# Patient Record
Sex: FEMALE | Race: WHITE | Employment: UNEMPLOYED | URBAN - METROPOLITAN AREA
[De-identification: names, ages, dates, MRNs, and addresses within clinical notes are randomized per-mention and may not be internally consistent; named-entity substitution may affect disease eponyms.]

---

## 2017-01-18 ENCOUNTER — DOCUMENTATION ONLY (OUTPATIENT)
Dept: ORTHOPEDIC SURGERY | Age: 57
End: 2017-01-18

## 2017-01-18 NOTE — PROGRESS NOTES
Received records request from [address] [place] [address] (Social Security Benefits claim) 1/18/17. Faxed to [facility] at .

## 2017-01-24 ENCOUNTER — DOCUMENTATION ONLY (OUTPATIENT)
Dept: ORTHOPEDIC SURGERY | Facility: CLINIC | Age: 57
End: 2017-01-24

## 2017-02-15 ENCOUNTER — OFFICE VISIT (OUTPATIENT)
Dept: CARDIOLOGY CLINIC | Age: 57
End: 2017-02-15

## 2017-02-15 VITALS
DIASTOLIC BLOOD PRESSURE: 96 MMHG | WEIGHT: 178 LBS | BODY MASS INDEX: 28.61 KG/M2 | HEART RATE: 66 BPM | SYSTOLIC BLOOD PRESSURE: 164 MMHG | HEIGHT: 66 IN

## 2017-02-15 DIAGNOSIS — Z95.5 S/P CORONARY ARTERY STENT PLACEMENT: ICD-10-CM

## 2017-02-15 DIAGNOSIS — I10 ESSENTIAL HYPERTENSION, BENIGN: ICD-10-CM

## 2017-02-15 DIAGNOSIS — G47.39 OTHER SLEEP APNEA: ICD-10-CM

## 2017-02-15 DIAGNOSIS — F17.200 SMOKER: ICD-10-CM

## 2017-02-15 DIAGNOSIS — E11.9 TYPE 2 DIABETES MELLITUS WITHOUT COMPLICATION, UNSPECIFIED LONG TERM INSULIN USE STATUS: ICD-10-CM

## 2017-02-15 DIAGNOSIS — E78.5 DYSLIPIDEMIA, GOAL LDL BELOW 70: ICD-10-CM

## 2017-02-15 DIAGNOSIS — I25.118 CORONARY ARTERY DISEASE OF NATIVE ARTERY OF NATIVE HEART WITH STABLE ANGINA PECTORIS (HCC): Primary | ICD-10-CM

## 2017-02-15 RX ORDER — HYDROCODONE BITARTRATE AND ACETAMINOPHEN 7.5; 325 MG/1; MG/1
TABLET ORAL
COMMUNITY
End: 2020-04-16

## 2017-02-15 RX ORDER — CYCLOBENZAPRINE HCL 10 MG
TABLET ORAL
COMMUNITY
End: 2020-05-18 | Stop reason: ALTCHOICE

## 2017-02-15 NOTE — MR AVS SNAPSHOT
Visit Information Date & Time Provider Department Dept. Phone Encounter #  
 2/15/2017 10:00 AM Luis Park MD [facility] [address] [place] [address] [id number] Follow-up Instructions Return in about 6 months (around 8/15/2017). Follow-up and Disposition History Your Appointments 8/8/2017  9:15 AM  
ESTABLISHED PATIENT with Luis Park MD  
[facility]) Appt Note: 6 months [address] [facility], [address] [facility] [address] [facility] [facility], [address] [place] [address] Upcoming Health Maintenance Date Due Hepatitis C Screening 1960 FOOT EXAM Q1 8/24/1972 MICROALBUMIN Q1 8/24/1972 EYE EXAM RETINAL OR DILATED Q1 8/24/1972 Pneumococcal 19-64 Medium Risk (1 of 1 - PPSV23) 8/24/1981 DTaP/Tdap/Td series (1 - Tdap) 8/24/1983 FOBT Q 1 YEAR AGE 50-75 8/24/2012 BREAST CANCER SCRN MAMMOGRAM 2/7/2016 LIPID PANEL Q1 6/4/2016 PAP AKA CERVICAL CYTOLOGY 1/17/2017 HEMOGLOBIN A1C Q6M 2/28/2017 Allergies as of 2/15/2017  Review Complete On: 2/15/2017 By: Luis Park MD  
 No Known Allergies Current Immunizations  Never Reviewed Name Date Influenza Vaccine (Quad) PF 9/27/2016  9:34 AM  
  
 Not reviewed this visit You Were Diagnosed With   
  
 Codes Comments Coronary artery disease of native artery of native heart with stable angina pectoris ([facility] [address]    -  Primary ICD-10-CM: I25.118 
ICD-9-CM: 414.01, 413.9 Essential hypertension, benign     ICD-10-CM: I10 
ICD-9-CM: 401.1 Dyslipidemia, goal LDL below 70     ICD-10-CM: E78.5 ICD-9-CM: 272.4 Type 2 diabetes mellitus without complication, unspecified long term insulin use status (HCC)     ICD-10-CM: E11.9 ICD-9-CM: 250.00 S/P coronary artery stent placement     ICD-10-CM: Z95.5 ICD-9-CM: V45.82 Smoker     ICD-10-CM: E99.281 ICD-9-CM: 305.1 Other sleep apnea     ICD-10-CM: G47.39 
ICD-9-CM: 327.29 Vitals BP Pulse Height(growth percentile) Weight(growth percentile) BMI OB Status (!) 164/96 (BP 1 Location: Left arm, BP Patient Position: At rest) 66 5' 6\" (1.676 m) 178 lb (80.7 kg) 28.73 kg/m2 Medically Induced Smoking Status Current Every Day Smoker Vitals History BMI and BSA Data Body Mass Index Body Surface Area 28.73 kg/m 2 1.94 m 2 Preferred Pharmacy Pharmacy Name Phone [facility] Bhanu Chow [address] [place] [address] [phone] Your Updated Medication List  
  
   
This list is accurate as of: 2/15/17 11:35 AM.  Always use your most recent med list.  
  
  
  
  
 ALPRAZolam 0.5 mg tablet Commonly known as:  Sharyon Kida Take 0.5 mg by mouth three (3) times daily as needed for Anxiety. aspirin delayed-release 81 mg tablet Take 1 Tab by mouth daily. atorvastatin 40 mg tablet Commonly known as:  LIPITOR Take 1 Tab by mouth nightly. BENADRYL 25 mg capsule Generic drug:  diphenhydrAMINE Take 25 mg by mouth every six (6) hours as needed for Itching. clopidogrel 75 mg Tab Commonly known as:  PLAVIX Take 1 Tab by mouth daily. cyclobenzaprine 10 mg tablet Commonly known as:  FLEXERIL Take  by mouth three (3) times daily as needed for Muscle Spasm(s). HYDROcodone-acetaminophen 7.5-325 mg per tablet Commonly known as:  Thelbert [place] Take  by mouth.  
  
 losartan 50 mg tablet Commonly known as:  COZAAR Take 1 Tab by mouth daily. metoprolol succinate 25 mg XL tablet Commonly known as:  TOPROL-XL Take 1 Tab by mouth daily. NEURONTIN 600 mg tablet Generic drug:  gabapentin Take 600 mg by mouth three (3) times daily. nitroglycerin 0.4 mg SL tablet Commonly known as:  NITROSTAT  
1 Tab by SubLINGual route as needed for Chest Pain. omeprazole 40 mg capsule Commonly known as:  PRILOSEC Take 40 mg by mouth daily. oxyCODONE-acetaminophen 5-325 mg per tablet Commonly known as:  PERCOCET Take 1 to 2 tab PO q 4-6 hrs prn pain SITagliptin 100 mg tablet Commonly known as:  Erling Sinner Take 100 mg by mouth daily. Follow-up Instructions Return in about 6 months (around 8/15/2017). Introducing [facility] SERVICES! Dear Bekah Wells: Thank you for requesting a [employer] account. Our records indicate that you already have an active [employer] account. You can access your account anytime at [email or web address] Did you know that you can access your hospital and ER discharge instructions at any time in [employer]? You can also review all of your test results from your hospital stay or ER visit. Additional Information If you have questions, please visit the Frequently Asked Questions section of the [employer] website at [email or web address] Remember, [employer] is NOT to be used for urgent needs. For medical emergencies, dial 911. Now available from your iPhone and Android! Please provide this summary of care documentation to your next provider. Your primary care clinician is listed as Maksim Silva. If you have any questions after today's visit, please call [phone].

## 2017-02-15 NOTE — PROGRESS NOTES
HISTORY OF PRESENT ILLNESS  Micah Montejo is a 49 y.o. female. Hypertension   The history is provided by the patient. This is a chronic problem. The current episode started more than 1 week ago. The problem occurs rarely. The problem has not changed since onset. Associated symptoms include chest pain. Pertinent negatives include no shortness of breath. Cholesterol Problem   The history is provided by the patient. This is a chronic problem. The current episode started more than 1 week ago. The problem has not changed since onset. Associated symptoms include chest pain. Pertinent negatives include no shortness of breath. Swelling   The history is provided by the patient. This is a chronic problem. The current episode started more than 1 week ago. The problem occurs rarely. Associated symptoms include chest pain. Pertinent negatives include no shortness of breath. Chest Pain (Angina)    The history is provided by the patient. This is a chronic problem. The current episode started more than 1 week ago. The problem has been rapidly improving. The problem occurs rarely. The pain is associated with normal activity and exertion. The pain is present in the substernal region. The pain is at a severity of 3/10. The quality of the pain is described as tightness. The pain does not radiate. Pertinent negatives include no claudication, no cough, no diaphoresis, no dizziness, no fever, no hemoptysis, no lower extremity edema, no nausea, no near-syncope, no orthopnea, no palpitations, no PND, no shortness of breath, no sputum production, no vomiting and no weakness. Risk factors include cardiac disease, smoking/tobacco exposure, hypertension, diabetes mellitus and dyslipidemia. Her past medical history is significant for DM and HTN. Procedural history includes cardiac catheterization, echocardiogram and cardiac stents. Review of Systems   Constitutional: Negative for chills, diaphoresis, fever and weight loss.    HENT: Negative for ear pain and hearing loss. Eyes: Negative for blurred vision. Respiratory: Negative for cough, hemoptysis, sputum production, shortness of breath, wheezing and stridor. Cardiovascular: Positive for chest pain. Negative for palpitations, orthopnea, claudication, leg swelling, PND and near-syncope. Gastrointestinal: Negative for heartburn, nausea and vomiting. Musculoskeletal: Negative for myalgias and neck pain. Skin: Negative for rash. Neurological: Negative for dizziness, tingling, tremors, focal weakness, loss of consciousness and weakness. Psychiatric/Behavioral: Negative for depression and suicidal ideas.      Family History   Problem Relation Age of Onset    Heart Disease Other        Past Medical History   Diagnosis Date    Arthritis     CAD (coronary artery disease)     Diabetes ([address]     Dyspnea on exertion     Essential hypertension, benign     GERD (gastroesophageal reflux disease)     H/O emotional problems     History of fall 16     Twisted right knee    Hypertension     Osteoarthritis of right knee     Osteoarthritis of right knee 2016    Other and unspecified hyperlipidemia     Pre-operative cardiovascular examination     Rheumatoid arthritis ([address]        Past Surgical History   Procedure Laterality Date    Hx  section      Hx gyn       endometrial ablation    Hx hysteroscopy with endometrial ablation      Pr cardiac surg procedure unlist       2 stents    Hx lap cholecystectomy      Hx appendectomy      Hx orthopaedic       left foot spur removal    Hx knee arthroscopy Right        Social History   Substance Use Topics    Smoking status: Current Every Day Smoker     Packs/day: 0.25     Years: 1.00    Smokeless tobacco: Never Used      Comment: 10 per day per patient      Alcohol use 0.0 oz/week     0 Standard drinks or equivalent per week      Comment: rare 1-2 yr       No Known Allergies    Outpatient Prescriptions Marked as Taking for the 2/15/17 encounter (Office Visit) with Abram Ayers MD   Medication Sig Dispense Refill    HYDROcodone-acetaminophen (NORCO) 7.5-325 mg per tablet Take  by mouth.  cyclobenzaprine (FLEXERIL) 10 mg tablet Take  by mouth three (3) times daily as needed for Muscle Spasm(s).  omeprazole (PRILOSEC) 40 mg capsule Take 40 mg by mouth daily.  diphenhydrAMINE (BENADRYL) 25 mg capsule Take 25 mg by mouth every six (6) hours as needed for Itching.  oxyCODONE-acetaminophen (PERCOCET) 5-325 mg per tablet Take 1 to 2 tab PO q 4-6 hrs prn pain (Patient taking differently: Take 2 Tabs by mouth every six (6) hours as needed for Pain.) 60 Tab 0    ALPRAZolam (XANAX) 0.5 mg tablet Take 0.5 mg by mouth three (3) times daily as needed for Anxiety.  sitaGLIPtin (JANUVIA) 100 mg tablet Take 100 mg by mouth daily.  metoprolol succinate (TOPROL-XL) 25 mg XL tablet Take 1 Tab by mouth daily. 30 Tab 1    losartan (COZAAR) 50 mg tablet Take 1 Tab by mouth daily. 30 Tab 1    atorvastatin (LIPITOR) 40 mg tablet Take 1 Tab by mouth nightly. (Patient taking differently: Take 40 mg by mouth daily.) 30 Tab 1    clopidogrel (PLAVIX) 75 mg tablet Take 1 Tab by mouth daily. 30 Tab 3    nitroglycerin (NITROSTAT) 0.4 mg SL tablet 1 Tab by SubLINGual route as needed for Chest Pain. (Patient taking differently: 1 Tab by SubLINGual route every five (5) minutes as needed for Chest Pain.) 1 Bottle 1    aspirin delayed-release 81 mg tablet Take 1 Tab by mouth daily. 30 Tab 11    gabapentin (NEURONTIN) 600 mg tablet Take 600 mg by mouth three (3) times daily. Visit Vitals    BP (!) 164/96 (BP 1 Location: Left arm, BP Patient Position: At rest)    Pulse 66    Ht 5' 6\" (1.676 m)    Wt 80.7 kg (178 lb)    BMI 28.73 kg/m2     Physical Exam   Constitutional: She is oriented to person, place, and time. She appears well-developed and well-nourished. No distress.    HENT: Head: Atraumatic. Mouth/Throat: No oropharyngeal exudate. Eyes: Conjunctivae are normal. No scleral icterus. Neck: Neck supple. No JVD present. Cardiovascular: Normal rate and regular rhythm. Exam reveals no gallop. Murmur (2/6 holosystolic murmur best heard at apex) heard. Pulmonary/Chest: Effort normal and breath sounds normal. No stridor. She has no wheezes. She has no rales. Abdominal: Soft. There is no tenderness. There is no rebound. Musculoskeletal: Normal range of motion. She exhibits no edema or tenderness. Neurological: She is alert and oriented to person, place, and time. She exhibits normal muscle tone. Skin: Skin is warm. She is not diaphoretic. Psychiatric: She has a normal mood and affect. Her behavior is normal.       ASSESSMENT and PLAN    ICD-10-CM ICD-9-CM    1. Coronary artery disease of native artery of native heart with stable angina pectoris ([facility] [address] I25.118 414.01      413.9    2. Essential hypertension, benign I10 401.1    3. Dyslipidemia, goal LDL below 70 E78.5 272.4    4. Type 2 diabetes mellitus without complication, unspecified long term insulin use status (HCC) E11.9 250.00    5. S/P coronary artery stent placement Z95.5 V45.82    6. Smoker F17.200 305.1    7. Other sleep apnea G47.39 327.29      No orders of the defined types were placed in this encounter. Follow-up Disposition:  Return in about 6 months (around 8/15/2017). current treatment plan is effective, no change in therapy  reviewed diet, exercise and weight control  very strongly urged to quit smoking to reduce cardiovascular risk  cardiovascular risk and specific lipid/LDL goals reviewed. 1) CAD--Recent stress test and echo at [facility] -- no ischemia with normal wall motion. Has residual PDA and BRIAN disease-- will continue intense medical management. use of aspirin to prevent MI and TIA's discussed. 2) dyslipidemia-- she is part of study for high cholesterol. Will obtain lipid profile.   3) post op after knee surgery--   4) htn-- her BP high due to pain in her right shoulder    Will f/u in 6months.

## 2017-02-15 NOTE — PROGRESS NOTES
1. Have you been to the ER, urgent care clinic since your last visit? Hospitalized since your last visit? No    2. Have you seen or consulted any other health care providers outside of the [address] [place] [address] since your last visit? Include any pap smears or colon screening. No     3. Since your last visit, have you had any of the following symptoms? Swelling in legs     4. Have you had any blood work, X-rays or cardiac testing? Patient had blood work with Dr. Jared Patel in December      5. Where do you normally have your labs drawn? 6. Do you need any refills today?  No     Medication confirmed by patient's medication list

## 2017-05-18 RX ORDER — NITROGLYCERIN 0.4 MG/1
0.4 TABLET SUBLINGUAL
Qty: 1 BOTTLE | Refills: 1 | Status: SHIPPED | OUTPATIENT
Start: 2017-05-18 | End: 2018-06-07 | Stop reason: SDUPTHER

## 2017-05-19 ENCOUNTER — HOSPITAL ENCOUNTER (EMERGENCY)
Age: 57
Discharge: HOME | End: 2017-05-19
Attending: EMERGENCY MEDICINE
Payer: COMMERCIAL

## 2017-05-19 ENCOUNTER — APPOINTMENT (OUTPATIENT)
Dept: GENERAL RADIOLOGY | Age: 57
End: 2017-05-19
Attending: EMERGENCY MEDICINE
Payer: COMMERCIAL

## 2017-05-19 VITALS
SYSTOLIC BLOOD PRESSURE: 153 MMHG | WEIGHT: 177 LBS | TEMPERATURE: 98.4 F | BODY MASS INDEX: 28.45 KG/M2 | HEART RATE: 72 BPM | RESPIRATION RATE: 15 BRPM | OXYGEN SATURATION: 99 % | HEIGHT: 66 IN | DIASTOLIC BLOOD PRESSURE: 80 MMHG

## 2017-05-19 DIAGNOSIS — R51.9 NONINTRACTABLE HEADACHE, UNSPECIFIED CHRONICITY PATTERN, UNSPECIFIED HEADACHE TYPE: ICD-10-CM

## 2017-05-19 DIAGNOSIS — K29.90 GASTRITIS AND DUODENITIS: Primary | ICD-10-CM

## 2017-05-19 LAB
ALBUMIN SERPL BCP-MCNC: 3.7 G/DL (ref 3.4–5)
ALBUMIN/GLOB SERPL: 1 {RATIO} (ref 0.8–1.7)
ALP SERPL-CCNC: 108 U/L (ref 45–117)
ALT SERPL-CCNC: 22 U/L (ref 13–56)
ANION GAP BLD CALC-SCNC: 10 MMOL/L (ref 3–18)
AST SERPL W P-5'-P-CCNC: 16 U/L (ref 15–37)
ATRIAL RATE: 76 BPM
BASOPHILS # BLD AUTO: 0 K/UL (ref 0–0.1)
BASOPHILS # BLD: 0 % (ref 0–2)
BILIRUB SERPL-MCNC: 0.2 MG/DL (ref 0.2–1)
BUN SERPL-MCNC: 12 MG/DL (ref 7–18)
BUN/CREAT SERPL: 11 (ref 12–20)
CALCIUM SERPL-MCNC: 9.2 MG/DL (ref 8.5–10.1)
CALCULATED P AXIS, ECG09: 67 DEGREES
CALCULATED R AXIS, ECG10: 37 DEGREES
CALCULATED T AXIS, ECG11: 72 DEGREES
CHLORIDE SERPL-SCNC: 102 MMOL/L (ref 100–108)
CO2 SERPL-SCNC: 27 MMOL/L (ref 21–32)
CREAT SERPL-MCNC: 1.06 MG/DL (ref 0.6–1.3)
DIAGNOSIS, 93000: NORMAL
DIFFERENTIAL METHOD BLD: ABNORMAL
EOSINOPHIL # BLD: 0 K/UL (ref 0–0.4)
EOSINOPHIL NFR BLD: 0 % (ref 0–5)
ERYTHROCYTE [DISTWIDTH] IN BLOOD BY AUTOMATED COUNT: 12 % (ref 11.6–14.5)
GLOBULIN SER CALC-MCNC: 3.7 G/DL (ref 2–4)
GLUCOSE SERPL-MCNC: 326 MG/DL (ref 74–99)
HCT VFR BLD AUTO: 41.5 % (ref 35–45)
HGB BLD-MCNC: 14.8 G/DL (ref 12–16)
LIPASE SERPL-CCNC: 167 U/L (ref 73–393)
LYMPHOCYTES # BLD AUTO: 10 % (ref 21–52)
LYMPHOCYTES # BLD: 1.4 K/UL (ref 0.9–3.6)
MCH RBC QN AUTO: 33.7 PG (ref 24–34)
MCHC RBC AUTO-ENTMCNC: 35.7 G/DL (ref 31–37)
MCV RBC AUTO: 94.5 FL (ref 74–97)
MONOCYTES # BLD: 0.8 K/UL (ref 0.05–1.2)
MONOCYTES NFR BLD AUTO: 6 % (ref 3–10)
NEUTS SEG # BLD: 12.3 K/UL (ref 1.8–8)
NEUTS SEG NFR BLD AUTO: 84 % (ref 40–73)
P-R INTERVAL, ECG05: 180 MS
PLATELET # BLD AUTO: 319 K/UL (ref 135–420)
PMV BLD AUTO: 10.6 FL (ref 9.2–11.8)
POTASSIUM SERPL-SCNC: 3.8 MMOL/L (ref 3.5–5.5)
PROT SERPL-MCNC: 7.4 G/DL (ref 6.4–8.2)
Q-T INTERVAL, ECG07: 356 MS
QRS DURATION, ECG06: 68 MS
QTC CALCULATION (BEZET), ECG08: 400 MS
RBC # BLD AUTO: 4.39 M/UL (ref 4.2–5.3)
SODIUM SERPL-SCNC: 139 MMOL/L (ref 136–145)
VENTRICULAR RATE, ECG03: 76 BPM
WBC # BLD AUTO: 14.5 K/UL (ref 4.6–13.2)

## 2017-05-19 PROCEDURE — 96374 THER/PROPH/DIAG INJ IV PUSH: CPT

## 2017-05-19 PROCEDURE — 74011250636 HC RX REV CODE- 250/636: Performed by: EMERGENCY MEDICINE

## 2017-05-19 PROCEDURE — 83690 ASSAY OF LIPASE: CPT | Performed by: EMERGENCY MEDICINE

## 2017-05-19 PROCEDURE — C9113 INJ PANTOPRAZOLE SODIUM, VIA: HCPCS | Performed by: EMERGENCY MEDICINE

## 2017-05-19 PROCEDURE — 85025 COMPLETE CBC W/AUTO DIFF WBC: CPT | Performed by: EMERGENCY MEDICINE

## 2017-05-19 PROCEDURE — 99285 EMERGENCY DEPT VISIT HI MDM: CPT

## 2017-05-19 PROCEDURE — 80053 COMPREHEN METABOLIC PANEL: CPT | Performed by: EMERGENCY MEDICINE

## 2017-05-19 PROCEDURE — 71010 XR CHEST PORT: CPT

## 2017-05-19 PROCEDURE — 96361 HYDRATE IV INFUSION ADD-ON: CPT

## 2017-05-19 PROCEDURE — 74011250637 HC RX REV CODE- 250/637: Performed by: EMERGENCY MEDICINE

## 2017-05-19 PROCEDURE — 93005 ELECTROCARDIOGRAM TRACING: CPT

## 2017-05-19 RX ORDER — PROMETHAZINE HYDROCHLORIDE 25 MG/1
25 TABLET ORAL
Qty: 12 TAB | Refills: 0 | Status: SHIPPED | OUTPATIENT
Start: 2017-05-19 | End: 2020-05-18

## 2017-05-19 RX ORDER — PANTOPRAZOLE SODIUM 40 MG/10ML
40 INJECTION, POWDER, LYOPHILIZED, FOR SOLUTION INTRAVENOUS
Status: COMPLETED | OUTPATIENT
Start: 2017-05-19 | End: 2017-05-19

## 2017-05-19 RX ORDER — ACETAMINOPHEN 500 MG
1000 TABLET ORAL
Status: COMPLETED | OUTPATIENT
Start: 2017-05-19 | End: 2017-05-19

## 2017-05-19 RX ORDER — PROMETHAZINE HYDROCHLORIDE 25 MG/1
25 TABLET ORAL
Status: COMPLETED | OUTPATIENT
Start: 2017-05-19 | End: 2017-05-19

## 2017-05-19 RX ADMIN — SODIUM CHLORIDE 500 ML: 900 INJECTION, SOLUTION INTRAVENOUS at 09:17

## 2017-05-19 RX ADMIN — SODIUM CHLORIDE 1000 ML: 900 INJECTION, SOLUTION INTRAVENOUS at 10:00

## 2017-05-19 RX ADMIN — ACETAMINOPHEN 1000 MG: 500 TABLET ORAL at 09:49

## 2017-05-19 RX ADMIN — PHENOBARBITAL 30 ML: 16.2; .1037; .0065; .0194 ELIXIR ORAL at 09:17

## 2017-05-19 RX ADMIN — PANTOPRAZOLE SODIUM 40 MG: 40 INJECTION, POWDER, FOR SOLUTION INTRAVENOUS at 09:17

## 2017-05-19 RX ADMIN — PROMETHAZINE HYDROCHLORIDE 25 MG: 25 TABLET ORAL at 10:53

## 2017-05-19 NOTE — ED NOTES
PT discharged per MD order. PT verbalized understanding of discharge instructions and follow up care. Patient ambulated self out of ED. Daughter will be driving PT home due to phenergan dose.

## 2017-05-19 NOTE — ED PROVIDER NOTES
HPI Comments: 8:43 AM Beth Stiles is a 49 y.o. female with a history of CAD, HTN, DM and GERD who presents to the emergency department for epigastric and RUQ abdominal pain and headache that began around 0200 this morning. Pt states, \"It feels like my GERD but a little higher\". Pt notes taking 2 Nitro and her daily Prilosec 40 mg without change in her symptoms. She denies any associated nausea or vomiting, no diarrhea or constipation, no melena or hematochezia. She denies any recent changes to her diet or medications. She states that she did have a chocolate milkshake last night which is out of the norm for her but felt fine upon going to bed; she also notes that her blood sugar was high this morning. She denies any chest pain, no palpitations, no cough or dyspnea. Pt has had a previous Cholecystectomy, hysterectomy and ; she notes that she has had persistent LLQ pain since her hysterectomy in January. She is followed by Dr. Sylvie Anthony, cardiology, and isn't scheduled to return to the office for several months. No other acute symptoms or complaints were noted. The history is provided by the patient.         Past Medical History:   Diagnosis Date    Arthritis     CAD (coronary artery disease)     Diabetes ([address]     Dyspnea on exertion     Essential hypertension, benign     GERD (gastroesophageal reflux disease)     H/O emotional problems     History of fall 14    Twisted right knee    Hypertension     Osteoarthritis of right knee     Osteoarthritis of right knee 2016    Other and unspecified hyperlipidemia     Pre-operative cardiovascular examination     Rheumatoid arthritis ([address]        Past Surgical History:   Procedure Laterality Date    CARDIAC SURG PROCEDURE UNLIST      2 stents    HX APPENDECTOMY      HX  SECTION      HX GYN      endometrial ablation    HX HYSTEROSCOPY WITH ENDOMETRIAL ABLATION      HX KNEE ARTHROSCOPY Right     HX LAP CHOLECYSTECTOMY      HX ORTHOPAEDIC      left foot spur removal         Family History:   Problem Relation Age of Onset    Heart Disease Other        Social History     Social History    Marital status:      Spouse name: N/A    Number of children: N/A    Years of education: N/A     Occupational History    Not on file. Social History Main Topics    Smoking status: Current Every Day Smoker     Packs/day: 0.25     Years: 1.00    Smokeless tobacco: Never Used      Comment: 10 per day per patient      Alcohol use 0.0 oz/week     0 Standard drinks or equivalent per week      Comment: rare 1-2 yr    Drug use: No    Sexual activity: Yes     Other Topics Concern    Not on file     Social History Narrative         ALLERGIES: Review of patient's allergies indicates no known allergies. Review of Systems   Constitutional: Negative for chills, diaphoresis and fever. HENT: Negative for congestion and rhinorrhea. Eyes: Negative. Respiratory: Negative for cough and shortness of breath. Cardiovascular: Negative for chest pain, palpitations and leg swelling. Gastrointestinal: Positive for abdominal pain (epigastric ). Negative for abdominal distention, anal bleeding, blood in stool, constipation, diarrhea, nausea and vomiting. Endocrine: Negative. Genitourinary: Negative for dysuria and hematuria. Musculoskeletal: Negative for arthralgias, back pain, myalgias, neck pain and neck stiffness. Skin: Negative for rash and wound. Allergic/Immunologic: Negative. Neurological: Negative for syncope, light-headedness and headaches. Hematological: Does not bruise/bleed easily. Psychiatric/Behavioral: Negative for confusion and hallucinations. All other systems reviewed and are negative.       Vitals:    05/19/17 0839   BP: (!) 170/114   Pulse: 92   Resp: 20   Temp: 98.4 °F (36.9 °C)   SpO2: 98%   Weight: 80.3 kg (177 lb)   Height: 5' 6\" (1.676 m)            Physical Exam Constitutional: She is oriented to person, place, and time. She appears well-developed and well-nourished. No distress. Resting comfortably on stretcher   HENT:   Head: Normocephalic and atraumatic. MM moist   Eyes: Conjunctivae and EOM are normal. No scleral icterus. Sclera clear bilaterally   Neck: Normal range of motion. Neck supple. No JVD present. Non-tender to palpation   Cardiovascular: Normal rate, regular rhythm and normal heart sounds. Exam reveals no gallop and no friction rub. No murmur heard. Pulmonary/Chest: Effort normal and breath sounds normal. No respiratory distress. She has no wheezes. She has no rales. She exhibits no tenderness. No crepitance with palpation   Abdominal: Soft. Bowel sounds are normal. She exhibits no distension and no mass. There is no tenderness. There is no rebound and no guarding. Genitourinary:   Genitourinary Comments: No CVA tenderness   Musculoskeletal: She exhibits no edema or tenderness. Normal inspection of upper extremities. No edema noted to bilateral lower extremities   Lymphadenopathy:     She has no cervical adenopathy. Neurological: She is alert and oriented to person, place, and time. No cranial nerve deficit. She exhibits normal muscle tone. Normal motor and sensation bilaterally to upper and lower extremities   Skin: Skin is warm and dry. No rash noted. She is not diaphoretic. Psychiatric:   Normal mood and affect. Vitals reviewed. MDM  Number of Diagnoses or Management Options  Diagnosis management comments: Pt with complaint of RUQ abdominal pain without associated infectious, cardiac, pulmonary or GI symptoms. Benign exam and reassuring VS.  Will treat headache, intact neuro exam, suspect due to NTG use. Will check labs and treat symptoms. Reassess. Reviewed old chart:  Stress and echo done at [facility] March 2016 which showed no ischemia or RWMA. Pt states she is feeling somewhat better and is ready to go home. She will return to ED if symptoms do not continue to improve or worsen and will return to ED if she develops chest pain. She will follow up with her PMD next week. Amount and/or Complexity of Data Reviewed  Clinical lab tests: ordered and reviewed  Tests in the radiology section of CPT®: ordered and reviewed  Tests in the medicine section of CPT®: ordered and reviewed  Decide to obtain previous medical records or to obtain history from someone other than the patient: yes  Obtain history from someone other than the patient: yes  Discuss the patient with other providers: yes  Independent visualization of images, tracings, or specimens: yes    Risk of Complications, Morbidity, and/or Mortality  Presenting problems: moderate  Management options: moderate    Patient Progress  Patient progress: improved    ED Course       Procedures    EKG:  NSR, rate 76, normal axis, no significant ST-T abnormalities. No significant changes noted from 8/30/16. CXR:  No acute pulmonary process      Scribe Attestation:     I, [address] [place] [address] for and in the presence of Caden Pineda MD May 19, 2017 at 8:44 AM     Signed by: Ant Romo May 19, 2017, 8:44 AM      Physician Attestation:   I personally performed the services described in this documentation, reviewed and edited the documentation which was dictated to the scribe in my presence, and it accurately records my words and actions.  Caden Pineda MD  May 19, 2017 at 8:44 AM

## 2017-05-19 NOTE — ED NOTES
Provided ice pack for comfort per PT request. Pain Assessment completed. Safety Intact. Will continue to monitor.

## 2017-05-19 NOTE — ED NOTES
PT visibly distressed, dypsneic, c/o right chest pain under breast, states \"It feels like heartburn but different\", reports taking 2 Nitro SL at home with no relief, assisted to change into hospital gown, monitors attached, family at bedside, safety intact, will continue to monitor

## 2017-05-19 NOTE — ED NOTES
Patient ambulated to restroom, provided urine sample, assisted back to bed and monitors reattched, safety intact, will continue to monitor

## 2018-06-07 RX ORDER — NITROGLYCERIN 0.4 MG/1
TABLET SUBLINGUAL
Qty: 25 TAB | Refills: 0 | Status: SHIPPED | OUTPATIENT
Start: 2018-06-07 | End: 2020-05-18 | Stop reason: SDUPTHER

## 2018-11-07 ENCOUNTER — APPOINTMENT (OUTPATIENT)
Dept: PHYSICAL THERAPY | Age: 58
End: 2018-11-07

## 2018-12-29 ENCOUNTER — HOSPITAL ENCOUNTER (OUTPATIENT)
Dept: LAB | Age: 58
Discharge: HOME | End: 2018-12-29

## 2018-12-29 LAB — XX-LABCORP SPECIMEN COL,LCBCF: NORMAL

## 2018-12-29 PROCEDURE — 99001 SPECIMEN HANDLING PT-LAB: CPT

## 2019-01-02 ENCOUNTER — HOSPITAL ENCOUNTER (OUTPATIENT)
Dept: GENERAL RADIOLOGY | Age: 59
Discharge: HOME | End: 2019-01-02
Payer: MEDICARE

## 2019-01-02 DIAGNOSIS — Z01.818 PRE-OP TESTING: ICD-10-CM

## 2019-01-02 PROCEDURE — 71046 X-RAY EXAM CHEST 2 VIEWS: CPT

## 2019-01-04 ENCOUNTER — HOSPITAL ENCOUNTER (OUTPATIENT)
Dept: LAB | Age: 59
Discharge: HOME | End: 2019-01-04
Payer: MEDICARE

## 2019-01-04 PROCEDURE — 88311 DECALCIFY TISSUE: CPT

## 2019-01-04 PROCEDURE — 88305 TISSUE EXAM BY PATHOLOGIST: CPT

## 2019-01-04 PROCEDURE — 88307 TISSUE EXAM BY PATHOLOGIST: CPT

## 2020-01-19 ENCOUNTER — HOME HEALTH ADMISSION (OUTPATIENT)
Dept: HOME HEALTH SERVICES | Facility: HOME HEALTH | Age: 60
End: 2020-01-19
Payer: MEDICARE

## 2020-01-20 ENCOUNTER — HOME CARE VISIT (OUTPATIENT)
Dept: HOME HEALTH SERVICES | Facility: HOME HEALTH | Age: 60
End: 2020-01-20

## 2020-01-20 ENCOUNTER — HOME CARE VISIT (OUTPATIENT)
Dept: SCHEDULING | Facility: HOME HEALTH | Age: 60
End: 2020-01-20
Payer: MEDICARE

## 2020-01-20 VITALS
DIASTOLIC BLOOD PRESSURE: 68 MMHG | OXYGEN SATURATION: 97 % | SYSTOLIC BLOOD PRESSURE: 126 MMHG | TEMPERATURE: 98.3 F | HEART RATE: 70 BPM

## 2020-01-20 PROCEDURE — 400013 HH SOC

## 2020-01-20 PROCEDURE — G0151 HHCP-SERV OF PT,EA 15 MIN: HCPCS

## 2020-01-20 PROCEDURE — G0299 HHS/HOSPICE OF RN EA 15 MIN: HCPCS

## 2020-01-21 ENCOUNTER — HOME CARE VISIT (OUTPATIENT)
Dept: HOME HEALTH SERVICES | Facility: HOME HEALTH | Age: 60
End: 2020-01-21
Payer: MEDICARE

## 2020-01-21 VITALS
TEMPERATURE: 97.3 F | WEIGHT: 162 LBS | HEIGHT: 66 IN | SYSTOLIC BLOOD PRESSURE: 124 MMHG | OXYGEN SATURATION: 94 % | DIASTOLIC BLOOD PRESSURE: 80 MMHG | BODY MASS INDEX: 26.03 KG/M2 | RESPIRATION RATE: 16 BRPM | HEART RATE: 71 BPM

## 2020-01-22 ENCOUNTER — HOME CARE VISIT (OUTPATIENT)
Dept: SCHEDULING | Facility: HOME HEALTH | Age: 60
End: 2020-01-22
Payer: MEDICARE

## 2020-01-22 VITALS
SYSTOLIC BLOOD PRESSURE: 136 MMHG | OXYGEN SATURATION: 97 % | TEMPERATURE: 97.7 F | DIASTOLIC BLOOD PRESSURE: 79 MMHG | HEART RATE: 76 BPM

## 2020-01-22 PROCEDURE — G0157 HHC PT ASSISTANT EA 15: HCPCS

## 2020-01-24 ENCOUNTER — HOME CARE VISIT (OUTPATIENT)
Dept: SCHEDULING | Facility: HOME HEALTH | Age: 60
End: 2020-01-24
Payer: MEDICARE

## 2020-01-24 VITALS
TEMPERATURE: 97.9 F | OXYGEN SATURATION: 97 % | SYSTOLIC BLOOD PRESSURE: 130 MMHG | DIASTOLIC BLOOD PRESSURE: 75 MMHG | HEART RATE: 69 BPM

## 2020-01-24 PROCEDURE — G0157 HHC PT ASSISTANT EA 15: HCPCS

## 2020-01-24 PROCEDURE — G0300 HHS/HOSPICE OF LPN EA 15 MIN: HCPCS

## 2020-01-27 ENCOUNTER — HOME CARE VISIT (OUTPATIENT)
Dept: SCHEDULING | Facility: HOME HEALTH | Age: 60
End: 2020-01-27
Payer: MEDICARE

## 2020-01-27 VITALS
OXYGEN SATURATION: 97 % | HEART RATE: 76 BPM | DIASTOLIC BLOOD PRESSURE: 85 MMHG | TEMPERATURE: 97.7 F | SYSTOLIC BLOOD PRESSURE: 138 MMHG

## 2020-01-27 PROCEDURE — G0157 HHC PT ASSISTANT EA 15: HCPCS

## 2020-01-29 ENCOUNTER — HOME CARE VISIT (OUTPATIENT)
Dept: SCHEDULING | Facility: HOME HEALTH | Age: 60
End: 2020-01-29
Payer: MEDICARE

## 2020-01-29 VITALS
HEART RATE: 67 BPM | TEMPERATURE: 97 F | DIASTOLIC BLOOD PRESSURE: 86 MMHG | OXYGEN SATURATION: 97 % | SYSTOLIC BLOOD PRESSURE: 151 MMHG | RESPIRATION RATE: 18 BRPM

## 2020-01-29 PROCEDURE — G0157 HHC PT ASSISTANT EA 15: HCPCS

## 2020-01-31 ENCOUNTER — HOME CARE VISIT (OUTPATIENT)
Dept: SCHEDULING | Facility: HOME HEALTH | Age: 60
End: 2020-01-31
Payer: MEDICARE

## 2020-01-31 VITALS
OXYGEN SATURATION: 98 % | HEART RATE: 66 BPM | TEMPERATURE: 97.9 F | DIASTOLIC BLOOD PRESSURE: 92 MMHG | SYSTOLIC BLOOD PRESSURE: 169 MMHG

## 2020-01-31 VITALS
HEART RATE: 69 BPM | OXYGEN SATURATION: 98 % | DIASTOLIC BLOOD PRESSURE: 79 MMHG | TEMPERATURE: 98.7 F | SYSTOLIC BLOOD PRESSURE: 147 MMHG

## 2020-01-31 PROCEDURE — G0157 HHC PT ASSISTANT EA 15: HCPCS

## 2020-02-04 ENCOUNTER — HOME CARE VISIT (OUTPATIENT)
Dept: SCHEDULING | Facility: HOME HEALTH | Age: 60
End: 2020-02-04
Payer: MEDICARE

## 2020-02-04 VITALS
HEART RATE: 62 BPM | OXYGEN SATURATION: 99 % | SYSTOLIC BLOOD PRESSURE: 154 MMHG | DIASTOLIC BLOOD PRESSURE: 80 MMHG | TEMPERATURE: 98.2 F

## 2020-02-04 PROCEDURE — G0151 HHCP-SERV OF PT,EA 15 MIN: HCPCS

## 2020-04-14 ENCOUNTER — APPOINTMENT (OUTPATIENT)
Dept: GENERAL RADIOLOGY | Age: 60
DRG: 282 | End: 2020-04-14
Attending: EMERGENCY MEDICINE
Payer: MEDICARE

## 2020-04-14 ENCOUNTER — HOSPITAL ENCOUNTER (INPATIENT)
Age: 60
LOS: 2 days | Discharge: HOME | DRG: 282 | End: 2020-04-16
Attending: EMERGENCY MEDICINE | Admitting: INTERNAL MEDICINE
Payer: MEDICARE

## 2020-04-14 DIAGNOSIS — I21.4 NSTEMI (NON-ST ELEVATED MYOCARDIAL INFARCTION) (HCC): Primary | ICD-10-CM

## 2020-04-14 PROBLEM — R07.9 CHEST PAIN: Status: ACTIVE | Noted: 2020-04-14

## 2020-04-14 PROBLEM — E87.6 HYPOKALEMIA: Status: ACTIVE | Noted: 2020-04-14

## 2020-04-14 PROBLEM — I25.10 CAD (CORONARY ARTERY DISEASE): Status: ACTIVE | Noted: 2020-04-14

## 2020-04-14 LAB
ALBUMIN SERPL-MCNC: 3.9 G/DL (ref 3.4–5)
ALBUMIN/GLOB SERPL: 1.1 {RATIO} (ref 0.8–1.7)
ALP SERPL-CCNC: 96 U/L (ref 45–117)
ALT SERPL-CCNC: 15 U/L (ref 13–56)
ANION GAP SERPL CALC-SCNC: 9 MMOL/L (ref 3–18)
APTT PPP: 21.8 SEC (ref 23–36.4)
AST SERPL-CCNC: 10 U/L (ref 10–38)
BASOPHILS # BLD: 0.1 K/UL (ref 0–0.1)
BASOPHILS NFR BLD: 1 % (ref 0–2)
BILIRUB SERPL-MCNC: 0.3 MG/DL (ref 0.2–1)
BUN SERPL-MCNC: 15 MG/DL (ref 7–18)
BUN/CREAT SERPL: 16 (ref 12–20)
CALCIUM SERPL-MCNC: 8.7 MG/DL (ref 8.5–10.1)
CHLORIDE SERPL-SCNC: 105 MMOL/L (ref 100–111)
CK MB CFR SERPL CALC: 4.3 % (ref 0–4)
CK MB CFR SERPL CALC: 6.3 % (ref 0–4)
CK MB CFR SERPL CALC: NORMAL % (ref 0–4)
CK MB SERPL-MCNC: 3.6 NG/ML (ref 5–25)
CK MB SERPL-MCNC: 6.9 NG/ML (ref 5–25)
CK MB SERPL-MCNC: <1 NG/ML (ref 5–25)
CK SERPL-CCNC: 109 U/L (ref 26–192)
CK SERPL-CCNC: 57 U/L (ref 26–192)
CK SERPL-CCNC: 84 U/L (ref 26–192)
CO2 SERPL-SCNC: 27 MMOL/L (ref 21–32)
CREAT SERPL-MCNC: 0.92 MG/DL (ref 0.6–1.3)
DIFFERENTIAL METHOD BLD: ABNORMAL
EOSINOPHIL # BLD: 0.3 K/UL (ref 0–0.4)
EOSINOPHIL NFR BLD: 2 % (ref 0–5)
ERYTHROCYTE [DISTWIDTH] IN BLOOD BY AUTOMATED COUNT: 12.4 % (ref 11.6–14.5)
GLOBULIN SER CALC-MCNC: 3.7 G/DL (ref 2–4)
GLUCOSE SERPL-MCNC: 199 MG/DL (ref 74–99)
HCT VFR BLD AUTO: 43.7 % (ref 35–45)
HGB BLD-MCNC: 14.7 G/DL (ref 12–16)
LYMPHOCYTES # BLD: 2.7 K/UL (ref 0.9–3.6)
LYMPHOCYTES NFR BLD: 22 % (ref 21–52)
MCH RBC QN AUTO: 32.3 PG (ref 24–34)
MCHC RBC AUTO-ENTMCNC: 33.6 G/DL (ref 31–37)
MCV RBC AUTO: 96 FL (ref 74–97)
MONOCYTES # BLD: 0.7 K/UL (ref 0.05–1.2)
MONOCYTES NFR BLD: 6 % (ref 3–10)
NEUTS SEG # BLD: 8.4 K/UL (ref 1.8–8)
NEUTS SEG NFR BLD: 69 % (ref 40–73)
PLATELET # BLD AUTO: 345 K/UL (ref 135–420)
PMV BLD AUTO: 10.5 FL (ref 9.2–11.8)
POTASSIUM SERPL-SCNC: 3.1 MMOL/L (ref 3.5–5.5)
PROT SERPL-MCNC: 7.6 G/DL (ref 6.4–8.2)
RBC # BLD AUTO: 4.55 M/UL (ref 4.2–5.3)
SODIUM SERPL-SCNC: 141 MMOL/L (ref 136–145)
TROPONIN I SERPL-MCNC: 0.86 NG/ML (ref 0–0.04)
TROPONIN I SERPL-MCNC: 2.16 NG/ML (ref 0–0.04)
TROPONIN I SERPL-MCNC: <0.02 NG/ML (ref 0–0.04)
WBC # BLD AUTO: 12.1 K/UL (ref 4.6–13.2)

## 2020-04-14 PROCEDURE — 96375 TX/PRO/DX INJ NEW DRUG ADDON: CPT

## 2020-04-14 PROCEDURE — 74011250637 HC RX REV CODE- 250/637: Performed by: EMERGENCY MEDICINE

## 2020-04-14 PROCEDURE — 99285 EMERGENCY DEPT VISIT HI MDM: CPT

## 2020-04-14 PROCEDURE — B2151ZZ FLUOROSCOPY OF LEFT HEART USING LOW OSMOLAR CONTRAST: ICD-10-PCS | Performed by: INTERNAL MEDICINE

## 2020-04-14 PROCEDURE — 80053 COMPREHEN METABOLIC PANEL: CPT

## 2020-04-14 PROCEDURE — 74011250636 HC RX REV CODE- 250/636: Performed by: EMERGENCY MEDICINE

## 2020-04-14 PROCEDURE — 82550 ASSAY OF CK (CPK): CPT

## 2020-04-14 PROCEDURE — 71045 X-RAY EXAM CHEST 1 VIEW: CPT

## 2020-04-14 PROCEDURE — 85025 COMPLETE CBC W/AUTO DIFF WBC: CPT

## 2020-04-14 PROCEDURE — 93005 ELECTROCARDIOGRAM TRACING: CPT

## 2020-04-14 PROCEDURE — 96374 THER/PROPH/DIAG INJ IV PUSH: CPT

## 2020-04-14 PROCEDURE — 74011250637 HC RX REV CODE- 250/637: Performed by: INTERNAL MEDICINE

## 2020-04-14 PROCEDURE — B2111ZZ FLUOROSCOPY OF MULTIPLE CORONARY ARTERIES USING LOW OSMOLAR CONTRAST: ICD-10-PCS | Performed by: INTERNAL MEDICINE

## 2020-04-14 PROCEDURE — 65660000000 HC RM CCU STEPDOWN

## 2020-04-14 PROCEDURE — 85730 THROMBOPLASTIN TIME PARTIAL: CPT

## 2020-04-14 RX ORDER — POTASSIUM CHLORIDE 20 MEQ/1
40 TABLET, EXTENDED RELEASE ORAL EVERY 4 HOURS
Status: COMPLETED | OUTPATIENT
Start: 2020-04-14 | End: 2020-04-14

## 2020-04-14 RX ORDER — MORPHINE SULFATE 4 MG/ML
4 INJECTION, SOLUTION INTRAMUSCULAR; INTRAVENOUS
Status: COMPLETED | OUTPATIENT
Start: 2020-04-14 | End: 2020-04-14

## 2020-04-14 RX ORDER — PANTOPRAZOLE SODIUM 40 MG/1
40 TABLET, DELAYED RELEASE ORAL
Status: DISCONTINUED | OUTPATIENT
Start: 2020-04-15 | End: 2020-04-16 | Stop reason: HOSPADM

## 2020-04-14 RX ORDER — NITROGLYCERIN 0.4 MG/1
0.4 TABLET SUBLINGUAL AS NEEDED
Status: DISCONTINUED | OUTPATIENT
Start: 2020-04-14 | End: 2020-04-16 | Stop reason: HOSPADM

## 2020-04-14 RX ORDER — ACETAMINOPHEN 325 MG/1
650 TABLET ORAL
Status: DISCONTINUED | OUTPATIENT
Start: 2020-04-14 | End: 2020-04-16 | Stop reason: HOSPADM

## 2020-04-14 RX ORDER — OXYCODONE HYDROCHLORIDE 5 MG/1
5 TABLET ORAL
Status: DISCONTINUED | OUTPATIENT
Start: 2020-04-14 | End: 2020-04-16 | Stop reason: HOSPADM

## 2020-04-14 RX ORDER — AMLODIPINE BESYLATE 10 MG/1
10 TABLET ORAL DAILY
Status: DISCONTINUED | OUTPATIENT
Start: 2020-04-15 | End: 2020-04-16 | Stop reason: HOSPADM

## 2020-04-14 RX ORDER — DIPHENHYDRAMINE HCL 25 MG
25 CAPSULE ORAL
Status: DISCONTINUED | OUTPATIENT
Start: 2020-04-14 | End: 2020-04-16 | Stop reason: HOSPADM

## 2020-04-14 RX ORDER — HYDROXYCHLOROQUINE SULFATE 200 MG/1
200 TABLET, FILM COATED ORAL 2 TIMES DAILY
Status: DISCONTINUED | OUTPATIENT
Start: 2020-04-14 | End: 2020-04-16 | Stop reason: HOSPADM

## 2020-04-14 RX ORDER — CYCLOBENZAPRINE HCL 10 MG
5 TABLET ORAL
Status: DISCONTINUED | OUTPATIENT
Start: 2020-04-14 | End: 2020-04-16 | Stop reason: HOSPADM

## 2020-04-14 RX ORDER — METOPROLOL SUCCINATE 25 MG/1
25 TABLET, EXTENDED RELEASE ORAL DAILY
Status: DISCONTINUED | OUTPATIENT
Start: 2020-04-15 | End: 2020-04-16 | Stop reason: HOSPADM

## 2020-04-14 RX ORDER — VENLAFAXINE HYDROCHLORIDE 37.5 MG/1
37.5 CAPSULE, EXTENDED RELEASE ORAL DAILY
Status: DISCONTINUED | OUTPATIENT
Start: 2020-04-15 | End: 2020-04-16 | Stop reason: HOSPADM

## 2020-04-14 RX ORDER — ONDANSETRON 2 MG/ML
4 INJECTION INTRAMUSCULAR; INTRAVENOUS
Status: DISCONTINUED | OUTPATIENT
Start: 2020-04-14 | End: 2020-04-16 | Stop reason: HOSPADM

## 2020-04-14 RX ORDER — GABAPENTIN 400 MG/1
400 CAPSULE ORAL 3 TIMES DAILY
Status: DISCONTINUED | OUTPATIENT
Start: 2020-04-14 | End: 2020-04-16 | Stop reason: HOSPADM

## 2020-04-14 RX ORDER — POTASSIUM CHLORIDE 20 MEQ/1
40 TABLET, EXTENDED RELEASE ORAL
Status: COMPLETED | OUTPATIENT
Start: 2020-04-14 | End: 2020-04-14

## 2020-04-14 RX ORDER — ALPRAZOLAM 0.25 MG/1
0.25 TABLET ORAL
Status: DISCONTINUED | OUTPATIENT
Start: 2020-04-14 | End: 2020-04-16 | Stop reason: HOSPADM

## 2020-04-14 RX ORDER — DICLOFENAC EPOLAMINE 0.01 G/1
1 PATCH TOPICAL EVERY 12 HOURS
Status: DISCONTINUED | OUTPATIENT
Start: 2020-04-14 | End: 2020-04-16 | Stop reason: HOSPADM

## 2020-04-14 RX ORDER — HEPARIN SODIUM 10000 [USP'U]/100ML
12-25 INJECTION, SOLUTION INTRAVENOUS
Status: DISCONTINUED | OUTPATIENT
Start: 2020-04-14 | End: 2020-04-15

## 2020-04-14 RX ORDER — ALOGLIPTIN 6.25 MG/1
12.5 TABLET, FILM COATED ORAL DAILY
Status: DISCONTINUED | OUTPATIENT
Start: 2020-04-15 | End: 2020-04-16 | Stop reason: HOSPADM

## 2020-04-14 RX ORDER — ONDANSETRON 2 MG/ML
4 INJECTION INTRAMUSCULAR; INTRAVENOUS
Status: COMPLETED | OUTPATIENT
Start: 2020-04-14 | End: 2020-04-14

## 2020-04-14 RX ORDER — HEPARIN SODIUM 1000 [USP'U]/ML
4000 INJECTION, SOLUTION INTRAVENOUS; SUBCUTANEOUS ONCE
Status: COMPLETED | OUTPATIENT
Start: 2020-04-14 | End: 2020-04-14

## 2020-04-14 RX ORDER — ASPIRIN 81 MG/1
81 TABLET ORAL DAILY
Status: DISCONTINUED | OUTPATIENT
Start: 2020-04-15 | End: 2020-04-16 | Stop reason: HOSPADM

## 2020-04-14 RX ORDER — ATORVASTATIN CALCIUM 40 MG/1
40 TABLET, FILM COATED ORAL
Status: DISCONTINUED | OUTPATIENT
Start: 2020-04-14 | End: 2020-04-15

## 2020-04-14 RX ORDER — LOSARTAN POTASSIUM 25 MG/1
50 TABLET ORAL DAILY
Status: DISCONTINUED | OUTPATIENT
Start: 2020-04-15 | End: 2020-04-15

## 2020-04-14 RX ORDER — CALCIUM CARBONATE 200(500)MG
200 TABLET,CHEWABLE ORAL
Status: DISCONTINUED | OUTPATIENT
Start: 2020-04-15 | End: 2020-04-16 | Stop reason: HOSPADM

## 2020-04-14 RX ADMIN — POTASSIUM CHLORIDE 40 MEQ: 1500 TABLET, EXTENDED RELEASE ORAL at 20:41

## 2020-04-14 RX ADMIN — HYDROXYCHLOROQUINE SULFATE 200 MG: 200 TABLET, FILM COATED ORAL at 23:48

## 2020-04-14 RX ADMIN — GABAPENTIN 400 MG: 400 CAPSULE ORAL at 23:48

## 2020-04-14 RX ADMIN — ONDANSETRON 4 MG: 2 INJECTION INTRAMUSCULAR; INTRAVENOUS at 16:04

## 2020-04-14 RX ADMIN — ATORVASTATIN CALCIUM 40 MG: 40 TABLET, FILM COATED ORAL at 23:47

## 2020-04-14 RX ADMIN — POTASSIUM CHLORIDE 40 MEQ: 1500 TABLET, EXTENDED RELEASE ORAL at 16:22

## 2020-04-14 RX ADMIN — HEPARIN SODIUM 4000 UNITS: 1000 INJECTION, SOLUTION INTRAVENOUS; SUBCUTANEOUS at 19:51

## 2020-04-14 RX ADMIN — MORPHINE SULFATE 4 MG: 4 INJECTION, SOLUTION INTRAMUSCULAR; INTRAVENOUS at 16:04

## 2020-04-14 RX ADMIN — POTASSIUM CHLORIDE 40 MEQ: 1500 TABLET, EXTENDED RELEASE ORAL at 23:48

## 2020-04-14 RX ADMIN — HEPARIN SODIUM 12 UNITS/KG/HR: 10000 INJECTION, SOLUTION INTRAVENOUS at 19:57

## 2020-04-14 NOTE — Clinical Note
TRANSFER - OUT REPORT:     Verbal report given to: Jelly Calabrese RN. Report consisted of patient's Situation, Background, Assessment and   Recommendations(SBAR). Opportunity for questions and clarification was provided. Patient transported to: [address]

## 2020-04-14 NOTE — Clinical Note
Right groin and right radial prepped with ChloraPrep and draped. Wet prep solution applied at: 1342. Wet prep solution dried at: 1345. Wet prep elapsed drying time: 3 mins.

## 2020-04-14 NOTE — ED PROVIDER NOTES
EMERGENCY DEPARTMENT HISTORY AND PHYSICAL EXAM    3:50 PM      Date: 4/14/2020  Patient Name: Leavy Kussmaul    History of Presenting Illness     No chief complaint on file. History Provided By: Patient    Additional History (Context): Leavy Kussmaul is a 64 y.o. female with myocardial infarction who presents with chest pain. Patient states the pain started about an hour and half ago.'s midsternal radiating towards the right side. Patient states pulling on her axilla makes it better. She called 911 and received 1 full aspirin and 3 nitros prior to arrival.  Pain is still 10 out of 10. Patient denies nausea, not vomiting or diarrhea. Patient denies fever, cough or shortness of breath. Patient has a [facility] cardiologist.    PCP: Chely Henriquez MD      Current Facility-Administered Medications   Medication Dose Route Frequency Provider Last Rate Last Dose    heparin 25,000 units in D5W 250 ml infusion  12-25 Units/kg/hr IntraVENous TITRATE Lyn Chong, DO         Current Outpatient Medications   Medication Sig Dispense Refill    acetaminophen (TYLENOL) 500 mg tablet Take 1,000 mg by mouth every eight (8) hours as needed for Pain.  amLODIPine (NORVASC) 10 mg tablet Take 10 mg by mouth daily.  hydroxychloroquine (PLAQUENIL) 200 mg tablet Take 200 mg by mouth two (2) times a day.  oxyCODONE IR (ROXICODONE) 5 mg immediate release tablet Take 5 mg by mouth every four (4) hours as needed for Pain.  venlafaxine-ER 24 HR (EFFEXOR-ER) 37.5 mg tr24 tablet Take 37.5 mg by mouth daily.  nitroglycerin (NITROSTAT) 0.4 mg SL tablet PLACE 1 TABLET UNDER THE TONGUE EVERY 5 MINUTES AS NEEDED FOR CHEST PAIN FOR UP TO 3 DOSES 25 Tab 0    promethazine (PHENERGAN) 25 mg tablet Take 1 Tab by mouth every six (6) hours as needed for Nausea. 12 Tab 0    HYDROcodone-acetaminophen (NORCO) 7.5-325 mg per tablet Take  by mouth.       cyclobenzaprine (FLEXERIL) 10 mg tablet Take by mouth three (3) times daily as needed for Muscle Spasm(s).  omeprazole (PRILOSEC) 40 mg capsule Take 40 mg by mouth daily.  diphenhydrAMINE (BENADRYL) 25 mg capsule Take 25 mg by mouth every six (6) hours as needed for Itching.  oxyCODONE-acetaminophen (PERCOCET) 5-325 mg per tablet Take 1 to 2 tab PO q 4-6 hrs prn pain (Patient taking differently: Take 2 Tabs by mouth every six (6) hours as needed for Pain.) 60 Tab 0    ALPRAZolam (XANAX) 0.5 mg tablet Take 0.5 mg by mouth three (3) times daily as needed for Anxiety.  sitaGLIPtin (JANUVIA) 100 mg tablet Take 100 mg by mouth daily.  metoprolol succinate (TOPROL-XL) 25 mg XL tablet Take 1 Tab by mouth daily. 30 Tab 1    losartan (COZAAR) 50 mg tablet Take 1 Tab by mouth daily. 30 Tab 1    atorvastatin (LIPITOR) 40 mg tablet Take 1 Tab by mouth nightly. (Patient taking differently: Take 40 mg by mouth daily.) 30 Tab 1    clopidogrel (PLAVIX) 75 mg tablet Take 1 Tab by mouth daily. 30 Tab 3    aspirin delayed-release 81 mg tablet Take 1 Tab by mouth daily. 30 Tab 11    gabapentin (NEURONTIN) 600 mg tablet Take 600 mg by mouth three (3) times daily.          Past History     Past Medical History:  Past Medical History:   Diagnosis Date    Arthritis     CAD (coronary artery disease)     Diabetes ([address]     Dyspnea on exertion     Essential hypertension, benign     GERD (gastroesophageal reflux disease)     H/O emotional problems     History of fall 14    Twisted right knee    Hypertension     Osteoarthritis of right knee     Osteoarthritis of right knee 2016    Other and unspecified hyperlipidemia     Pre-operative cardiovascular examination     Rheumatoid arthritis ([address]        Past Surgical History:  Past Surgical History:   Procedure Laterality Date    CARDIAC SURG PROCEDURE UNLIST      2 stents    HX APPENDECTOMY      HX  SECTION      HX GYN      endometrial ablation    HX HYSTEROSCOPY WITH ENDOMETRIAL ABLATION      HX KNEE ARTHROSCOPY Right     HX LAP CHOLECYSTECTOMY      HX ORTHOPAEDIC      left foot spur removal       Family History:  Family History   Problem Relation Age of Onset    Heart Disease Other        Social History:  Social History     Tobacco Use    Smoking status: Current Every Day Smoker     Packs/day: 0.25     Years: 1.00     Pack years: 0.25    Smokeless tobacco: Never Used    Tobacco comment: 10 per day per patient     Substance Use Topics    Alcohol use: Yes     Alcohol/week: 0.0 standard drinks     Comment: rare 1-2 yr    Drug use: No       Allergies: Allergies   Allergen Reactions    Oxycodone Itching         Review of Systems       Review of Systems   Constitutional: Negative. Negative for chills, diaphoresis and fever. HENT: Negative. Negative for congestion, rhinorrhea and sore throat. Eyes: Negative. Negative for pain, discharge and redness. Respiratory: Negative. Negative for cough, chest tightness, shortness of breath and wheezing. Cardiovascular: Negative. Negative for chest pain. Gastrointestinal: Negative. Negative for abdominal pain, constipation, diarrhea, nausea and vomiting. Genitourinary: Negative. Negative for dysuria, flank pain, frequency, hematuria and urgency. Musculoskeletal: Negative. Negative for back pain and neck pain. Skin: Negative. Negative for rash. Neurological: Negative. Negative for syncope, weakness, numbness and headaches. Psychiatric/Behavioral: Negative. All other systems reviewed and are negative. Physical Exam     Visit Vitals  /86   Pulse 74   Temp 98.6 °F (37 °C)   Resp 23   Wt 79.4 kg (175 lb)   SpO2 99%   BMI 28.25 kg/m²         Physical Exam  Vitals signs and nursing note reviewed. Constitutional:       General: She is not in acute distress. Appearance: Normal appearance. She is well-developed. She is not ill-appearing, toxic-appearing or diaphoretic. HENT:      Head: Normocephalic and atraumatic. Mouth/Throat:      Pharynx: No oropharyngeal exudate. Eyes:      General: No scleral icterus. Conjunctiva/sclera: Conjunctivae normal.      Pupils: Pupils are equal, round, and reactive to light. Neck:      Musculoskeletal: Normal range of motion and neck supple. Thyroid: No thyromegaly. Vascular: No hepatojugular reflux or JVD. Trachea: No tracheal deviation. Cardiovascular:      Rate and Rhythm: Normal rate and regular rhythm. Pulses: Normal pulses. Radial pulses are 2+ on the right side and 2+ on the left side. Dorsalis pedis pulses are 2+ on the right side and 2+ on the left side. Heart sounds: Normal heart sounds, S1 normal and S2 normal. No murmur. No gallop. No S3 or S4 sounds. Pulmonary:      Effort: Pulmonary effort is normal. No respiratory distress. Breath sounds: Normal breath sounds. No decreased breath sounds, wheezing, rhonchi or rales. Chest:       Abdominal:      General: Bowel sounds are normal. There is no distension. Palpations: Abdomen is soft. Abdomen is not rigid. There is no mass. Tenderness: There is no abdominal tenderness. There is no guarding or rebound. Negative signs include Higginbotham's sign and McBurney's sign. Musculoskeletal: Normal range of motion. Comments: Strength is 4/5 throughout. Lymphadenopathy:      Head:      Right side of head: No submental, submandibular, preauricular or occipital adenopathy. Left side of head: No submental, submandibular, preauricular or occipital adenopathy. Cervical: No cervical adenopathy. Upper Body:      Right upper body: No supraclavicular adenopathy. Left upper body: No supraclavicular adenopathy. Skin:     General: Skin is warm and dry. Findings: No rash. Neurological:      Mental Status: She is alert. She is not disoriented. GCS: GCS eye subscore is 4. GCS verbal subscore is 5.  GCS motor subscore is 6. Cranial Nerves: No cranial nerve deficit. Sensory: No sensory deficit. Coordination: Coordination normal.      Gait: Gait normal.      Deep Tendon Reflexes: Reflexes are normal and symmetric. Comments: Grossly intact. Psychiatric:         Speech: Speech normal.         Behavior: Behavior normal.         Thought Content: Thought content normal.         Judgment: Judgment normal.           Diagnostic Study Results     Labs -  Recent Results (from the past 12 hour(s))   CBC WITH AUTOMATED DIFF    Collection Time: 04/14/20  3:25 PM   Result Value Ref Range    WBC 12.1 4.6 - 13.2 K/uL    RBC 4.55 4.20 - 5.30 M/uL    HGB 14.7 12.0 - 16.0 g/dL    HCT 43.7 35.0 - 45.0 %    MCV 96.0 74.0 - 97.0 FL    MCH 32.3 24.0 - 34.0 PG    MCHC 33.6 31.0 - 37.0 g/dL    RDW 12.4 11.6 - 14.5 %    PLATELET 342 726 - 276 K/uL    MPV 10.5 9.2 - 11.8 FL    NEUTROPHILS 69 40 - 73 %    LYMPHOCYTES 22 21 - 52 %    MONOCYTES 6 3 - 10 %    EOSINOPHILS 2 0 - 5 %    BASOPHILS 1 0 - 2 %    ABS. NEUTROPHILS 8.4 (H) 1.8 - 8.0 K/UL    ABS. LYMPHOCYTES 2.7 0.9 - 3.6 K/UL    ABS. MONOCYTES 0.7 0.05 - 1.2 K/UL    ABS. EOSINOPHILS 0.3 0.0 - 0.4 K/UL    ABS. BASOPHILS 0.1 0.0 - 0.1 K/UL    DF AUTOMATED     METABOLIC PANEL, COMPREHENSIVE    Collection Time: 04/14/20  3:25 PM   Result Value Ref Range    Sodium 141 136 - 145 mmol/L    Potassium 3.1 (L) 3.5 - 5.5 mmol/L    Chloride 105 100 - 111 mmol/L    CO2 27 21 - 32 mmol/L    Anion gap 9 3.0 - 18 mmol/L    Glucose 199 (H) 74 - 99 mg/dL    BUN 15 7.0 - 18 MG/DL    Creatinine 0.92 0.6 - 1.3 MG/DL    BUN/Creatinine ratio 16 12 - 20      GFR est AA >60 >60 ml/min/1.73m2    GFR est non-AA >60 >60 ml/min/1.73m2    Calcium 8.7 8.5 - 10.1 MG/DL    Bilirubin, total 0.3 0.2 - 1.0 MG/DL    ALT (SGPT) 15 13 - 56 U/L    AST (SGOT) 10 10 - 38 U/L    Alk.  phosphatase 96 45 - 117 U/L    Protein, total 7.6 6.4 - 8.2 g/dL    Albumin 3.9 3.4 - 5.0 g/dL    Globulin 3.7 2.0 - 4.0 g/dL A-G Ratio 1.1 0.8 - 1.7     CARDIAC PANEL,(CK, CKMB & TROPONIN)    Collection Time: 04/14/20  3:25 PM   Result Value Ref Range    CK 57 26 - 192 U/L    CK - MB <1.0 <3.6 ng/ml    CK-MB Index  0.0 - 4.0 %     CALCULATION NOT PERFORMED WHEN RESULT IS BELOW LINEAR LIMIT    Troponin-I, QT <0.02 0.0 - 0.045 NG/ML   CARDIAC PANEL,(CK, CKMB & TROPONIN)    Collection Time: 04/14/20  6:23 PM   Result Value Ref Range    CK 84 26 - 192 U/L    CK - MB 3.6 (H) <3.6 ng/ml    CK-MB Index 4.3 (H) 0.0 - 4.0 %    Troponin-I, QT 0.86 (H) 0.0 - 0.045 NG/ML       Radiologic Studies -   XR CHEST PORT   Final Result   Impression:     No acute finding. Medical Decision Making   Provider Notes (Medical Decision Making):  MDM  Number of Diagnoses or Management Options  NSTEMI (non-ST elevated myocardial infarction) [facility]):   Diagnosis management comments: DIFFERENTIAL DIAGNOSES/ MEDICAL DECISION MAKING:  Chest pain etiologies include acute cardiac events to include possible acute myocardial infarction, acute coronary syndrome, pneumonia, chest wall pain (myofascial/ musculoskeletal etiology), chronic obstructive pulmonary disease (copd), acute asthma exacerbation, congestive heart failure, acute bronchitis, pulmonary embolism, upper respiratory infection, referred abdominal pain, other etiologies, versus combination of the above. I am the first provider for this patient. I reviewed the vital signs, available nursing notes, past medical history, past surgical history, family history and social history. Vital Signs-Reviewed the patient's vital signs. Records Reviewed: Nursing Notes (Time of Review: 3:50 PM)    ED Course: Progress Notes, Reevaluation, and Consults:    Labs essentially normal.  Chest X-Ray showed No acute process. EKG showed SR with rate of 63 bpm. With no ST elevations or depression and non specific T wave changes. 4:50 PM 4/14/2020    Will order repeat cardiac enzyme.     Repeat cardiac enzymes 0.86. Consult:  Discussed care with Kasandra Cuellar NP. Cardiology. Standard discussion; including history of patients chief complaint, available diagnostic results, and treatment course. Agrees with heparin. We will see patient in the morning. Would like Dr. group that placed on the treatment team.  7:30 PM    Consult:  Discussed care with Dr Ole Khan. Hospitalist.  Standard discussion; including history of patients chief complaint, available diagnostic results, and treatment course. Agrees with admission. 7:51 PM      Diagnosis       Clinical Impression:   1. NSTEMI (non-ST elevated myocardial infarction) [facility])        Disposition: Admitted       Attestation        Provider Attestation:     I personally performed the services described in the documentation, reviewed the documentation and it accurately and completely records my words and actions utilizing the [address] April 14, 2020 at 7:51 PM - [place], [address]. It is dictated using utilizing voice recognition software. Unfortunately this leads to occasional typographical errors. I apologize in advance if the situation occurs. If questions arise please do not hesitate to contact me or call our department.

## 2020-04-14 NOTE — Clinical Note
Contrast Dose Calculator:   Patient's age: 64.   Patient's sex: Female. Patient weight (kg) = 79. Creatinine level (mg/dL) = 0.75. Creatinine clearance (mL/min): 103. Contrast concentration (mg/mL) = 300. MACD = 300 mL. Max Contrast dose per Creatinine Cl calculator = 231.75 mL.

## 2020-04-14 NOTE — Clinical Note
TRANSFER - IN REPORT:     Verbal report received from: Wesley Cunningham RN. Report consisted of patient's Situation, Background, Assessment and   Recommendations(SBAR). Opportunity for questions and clarification was provided. Assessment completed upon patient's arrival to unit and care assumed. Patient transported with a Cardiac Cath Tech / Patient Care Tech.

## 2020-04-15 ENCOUNTER — APPOINTMENT (OUTPATIENT)
Dept: NON INVASIVE DIAGNOSTICS | Age: 60
DRG: 282 | End: 2020-04-15
Attending: INTERNAL MEDICINE
Payer: MEDICARE

## 2020-04-15 LAB
ACT BLD: 241 SECS (ref 79–138)
ANION GAP SERPL CALC-SCNC: 6 MMOL/L (ref 3–18)
APTT PPP: 30.7 SEC (ref 23–36.4)
APTT PPP: >180 SEC (ref 23–36.4)
ATRIAL RATE: 63 BPM
BASOPHILS # BLD: 0 K/UL (ref 0–0.1)
BASOPHILS NFR BLD: 0 % (ref 0–2)
BUN SERPL-MCNC: 11 MG/DL (ref 7–18)
BUN/CREAT SERPL: 15 (ref 12–20)
CALCIUM SERPL-MCNC: 8.8 MG/DL (ref 8.5–10.1)
CALCULATED P AXIS, ECG09: 65 DEGREES
CALCULATED R AXIS, ECG10: 21 DEGREES
CALCULATED T AXIS, ECG11: 83 DEGREES
CHLORIDE SERPL-SCNC: 107 MMOL/L (ref 100–111)
CHOLEST SERPL-MCNC: 352 MG/DL
CK MB CFR SERPL CALC: 6.7 % (ref 0–4)
CK MB SERPL-MCNC: 8 NG/ML (ref 5–25)
CK SERPL-CCNC: 119 U/L (ref 26–192)
CO2 SERPL-SCNC: 27 MMOL/L (ref 21–32)
CREAT SERPL-MCNC: 0.75 MG/DL (ref 0.6–1.3)
DIAGNOSIS, 93000: NORMAL
DIFFERENTIAL METHOD BLD: NORMAL
ECHO AO ROOT DIAM: 3.12 CM
ECHO LA AREA 4C: 17.7 CM2
ECHO LA VOL 2C: 41.62 ML (ref 22–52)
ECHO LA VOL 4C: 42.64 ML (ref 22–52)
ECHO LA VOL BP: 45.36 ML (ref 22–52)
ECHO LA VOL/BSA BIPLANE: 24 ML/M2 (ref 16–28)
ECHO LA VOLUME INDEX A2C: 22.02 ML/M2 (ref 16–28)
ECHO LA VOLUME INDEX A4C: 22.56 ML/M2 (ref 16–28)
ECHO LV EDV TEICHHOLZ: 0.49 ML
ECHO LV ESV TEICHHOLZ: 0.14 ML
ECHO LV INTERNAL DIMENSION DIASTOLIC: 4.26 CM (ref 3.9–5.3)
ECHO LV INTERNAL DIMENSION SYSTOLIC: 2.52 CM
ECHO LV IVSD: 1.2 CM (ref 0.6–0.9)
ECHO LV MASS 2D: 212.4 G (ref 67–162)
ECHO LV MASS INDEX 2D: 112.4 G/M2 (ref 43–95)
ECHO LV POSTERIOR WALL DIASTOLIC: 1.2 CM (ref 0.6–0.9)
ECHO LVOT DIAM: 1.68 CM
ECHO MV A VELOCITY: 101.99 CM/S
ECHO MV E DECELERATION TIME (DT): 247 MS
ECHO MV E VELOCITY: 82.36 CM/S
ECHO MV E/A RATIO: 0.81
ECHO TV REGURGITANT MAX VELOCITY: 170.54 CM/S
ECHO TV REGURGITANT PEAK GRADIENT: 11.6 MMHG
EOSINOPHIL # BLD: 0.2 K/UL (ref 0–0.4)
EOSINOPHIL NFR BLD: 2 % (ref 0–5)
ERYTHROCYTE [DISTWIDTH] IN BLOOD BY AUTOMATED COUNT: 12.3 % (ref 11.6–14.5)
GLUCOSE BLD STRIP.AUTO-MCNC: 169 MG/DL (ref 70–110)
GLUCOSE SERPL-MCNC: 167 MG/DL (ref 74–99)
HCT VFR BLD AUTO: 44.4 % (ref 35–45)
HDLC SERPL-MCNC: 61 MG/DL (ref 40–60)
HDLC SERPL: 5.8 {RATIO} (ref 0–5)
HGB BLD-MCNC: 14.9 G/DL (ref 12–16)
LDLC SERPL CALC-MCNC: 249.8 MG/DL (ref 0–100)
LIPID PROFILE,FLP: ABNORMAL
LVFS 2D: 40.96 %
LVSV (TEICH): 30.52 ML
LYMPHOCYTES # BLD: 2.7 K/UL (ref 0.9–3.6)
LYMPHOCYTES NFR BLD: 26 % (ref 21–52)
MAGNESIUM SERPL-MCNC: 2.1 MG/DL (ref 1.6–2.6)
MCH RBC QN AUTO: 31.4 PG (ref 24–34)
MCHC RBC AUTO-ENTMCNC: 33.6 G/DL (ref 31–37)
MCV RBC AUTO: 93.7 FL (ref 74–97)
MONOCYTES # BLD: 0.6 K/UL (ref 0.05–1.2)
MONOCYTES NFR BLD: 5 % (ref 3–10)
MV DEC SLOPE: 3.33
NEUTS SEG # BLD: 7 K/UL (ref 1.8–8)
NEUTS SEG NFR BLD: 67 % (ref 40–73)
P-R INTERVAL, ECG05: 182 MS
PLATELET # BLD AUTO: 345 K/UL (ref 135–420)
PMV BLD AUTO: 10 FL (ref 9.2–11.8)
POTASSIUM SERPL-SCNC: 3.9 MMOL/L (ref 3.5–5.5)
Q-T INTERVAL, ECG07: 424 MS
QRS DURATION, ECG06: 90 MS
QTC CALCULATION (BEZET), ECG08: 433 MS
RBC # BLD AUTO: 4.74 M/UL (ref 4.2–5.3)
SODIUM SERPL-SCNC: 140 MMOL/L (ref 136–145)
TRIGL SERPL-MCNC: 206 MG/DL (ref ?–150)
TROPONIN I SERPL-MCNC: 2.07 NG/ML (ref 0–0.04)
TSH SERPL DL<=0.05 MIU/L-ACNC: 3.99 UIU/ML (ref 0.36–3.74)
VENTRICULAR RATE, ECG03: 63 BPM
VLDLC SERPL CALC-MCNC: 41.2 MG/DL
WBC # BLD AUTO: 10.5 K/UL (ref 4.6–13.2)

## 2020-04-15 PROCEDURE — 93454 CORONARY ARTERY ANGIO S&I: CPT | Performed by: INTERNAL MEDICINE

## 2020-04-15 PROCEDURE — 83735 ASSAY OF MAGNESIUM: CPT

## 2020-04-15 PROCEDURE — 85025 COMPLETE CBC W/AUTO DIFF WBC: CPT

## 2020-04-15 PROCEDURE — 65270000029 HC RM PRIVATE

## 2020-04-15 PROCEDURE — C1894 INTRO/SHEATH, NON-LASER: HCPCS | Performed by: INTERNAL MEDICINE

## 2020-04-15 PROCEDURE — 99152 MOD SED SAME PHYS/QHP 5/>YRS: CPT | Performed by: INTERNAL MEDICINE

## 2020-04-15 PROCEDURE — 74011636320 HC RX REV CODE- 636/320: Performed by: INTERNAL MEDICINE

## 2020-04-15 PROCEDURE — 74011250636 HC RX REV CODE- 250/636: Performed by: INTERNAL MEDICINE

## 2020-04-15 PROCEDURE — C1769 GUIDE WIRE: HCPCS | Performed by: INTERNAL MEDICINE

## 2020-04-15 PROCEDURE — 85730 THROMBOPLASTIN TIME PARTIAL: CPT

## 2020-04-15 PROCEDURE — 80061 LIPID PANEL: CPT

## 2020-04-15 PROCEDURE — 82962 GLUCOSE BLOOD TEST: CPT

## 2020-04-15 PROCEDURE — 93005 ELECTROCARDIOGRAM TRACING: CPT

## 2020-04-15 PROCEDURE — 74011250637 HC RX REV CODE- 250/637: Performed by: INTERNAL MEDICINE

## 2020-04-15 PROCEDURE — 93571 IV DOP VEL&/PRESS C FLO 1ST: CPT | Performed by: INTERNAL MEDICINE

## 2020-04-15 PROCEDURE — 77030027845 HC BND COM RDL D-STAT TELE -B: Performed by: INTERNAL MEDICINE

## 2020-04-15 PROCEDURE — 74011000250 HC RX REV CODE- 250: Performed by: INTERNAL MEDICINE

## 2020-04-15 PROCEDURE — 77030013797 HC KT TRNSDUC PRSSR EDWD -A: Performed by: INTERNAL MEDICINE

## 2020-04-15 PROCEDURE — 85347 COAGULATION TIME ACTIVATED: CPT

## 2020-04-15 PROCEDURE — 99153 MOD SED SAME PHYS/QHP EA: CPT | Performed by: INTERNAL MEDICINE

## 2020-04-15 PROCEDURE — 80048 BASIC METABOLIC PNL TOTAL CA: CPT

## 2020-04-15 PROCEDURE — C1887 CATHETER, GUIDING: HCPCS | Performed by: INTERNAL MEDICINE

## 2020-04-15 PROCEDURE — 77030012468 HC VLV BLEEDBK CNTRL ABBT -B: Performed by: INTERNAL MEDICINE

## 2020-04-15 PROCEDURE — 84443 ASSAY THYROID STIM HORMONE: CPT

## 2020-04-15 PROCEDURE — 82550 ASSAY OF CK (CPK): CPT

## 2020-04-15 PROCEDURE — 93306 TTE W/DOPPLER COMPLETE: CPT

## 2020-04-15 PROCEDURE — 77030013744: Performed by: INTERNAL MEDICINE

## 2020-04-15 PROCEDURE — 77030015766: Performed by: INTERNAL MEDICINE

## 2020-04-15 RX ORDER — LIDOCAINE HYDROCHLORIDE 10 MG/ML
INJECTION, SOLUTION EPIDURAL; INFILTRATION; INTRACAUDAL; PERINEURAL AS NEEDED
Status: DISCONTINUED | OUTPATIENT
Start: 2020-04-15 | End: 2020-04-15 | Stop reason: HOSPADM

## 2020-04-15 RX ORDER — FENTANYL CITRATE 50 UG/ML
INJECTION, SOLUTION INTRAMUSCULAR; INTRAVENOUS AS NEEDED
Status: DISCONTINUED | OUTPATIENT
Start: 2020-04-15 | End: 2020-04-15 | Stop reason: HOSPADM

## 2020-04-15 RX ORDER — HEPARIN SODIUM 1000 [USP'U]/ML
INJECTION, SOLUTION INTRAVENOUS; SUBCUTANEOUS AS NEEDED
Status: DISCONTINUED | OUTPATIENT
Start: 2020-04-15 | End: 2020-04-15 | Stop reason: HOSPADM

## 2020-04-15 RX ORDER — METOPROLOL TARTRATE 5 MG/5ML
2.5 INJECTION INTRAVENOUS
Status: DISCONTINUED | OUTPATIENT
Start: 2020-04-15 | End: 2020-04-16 | Stop reason: HOSPADM

## 2020-04-15 RX ORDER — SODIUM CHLORIDE 0.9 % (FLUSH) 0.9 %
5-40 SYRINGE (ML) INJECTION EVERY 8 HOURS
Status: DISCONTINUED | OUTPATIENT
Start: 2020-04-15 | End: 2020-04-16 | Stop reason: HOSPADM

## 2020-04-15 RX ORDER — LOSARTAN POTASSIUM 50 MG/1
50 TABLET ORAL 2 TIMES DAILY
Status: DISCONTINUED | OUTPATIENT
Start: 2020-04-15 | End: 2020-04-16 | Stop reason: HOSPADM

## 2020-04-15 RX ORDER — SODIUM CHLORIDE 9 MG/ML
100 INJECTION, SOLUTION INTRAVENOUS CONTINUOUS
Status: DISPENSED | OUTPATIENT
Start: 2020-04-15 | End: 2020-04-15

## 2020-04-15 RX ORDER — CLOPIDOGREL 300 MG/1
TABLET, FILM COATED ORAL AS NEEDED
Status: DISCONTINUED | OUTPATIENT
Start: 2020-04-15 | End: 2020-04-15 | Stop reason: HOSPADM

## 2020-04-15 RX ORDER — ATORVASTATIN CALCIUM 40 MG/1
80 TABLET, FILM COATED ORAL
Status: DISCONTINUED | OUTPATIENT
Start: 2020-04-15 | End: 2020-04-16 | Stop reason: HOSPADM

## 2020-04-15 RX ORDER — CLOPIDOGREL BISULFATE 75 MG/1
75 TABLET ORAL DAILY
Status: DISCONTINUED | OUTPATIENT
Start: 2020-04-16 | End: 2020-04-16 | Stop reason: HOSPADM

## 2020-04-15 RX ORDER — SODIUM CHLORIDE 0.9 % (FLUSH) 0.9 %
5-40 SYRINGE (ML) INJECTION AS NEEDED
Status: DISCONTINUED | OUTPATIENT
Start: 2020-04-15 | End: 2020-04-16 | Stop reason: HOSPADM

## 2020-04-15 RX ORDER — HYDRALAZINE HYDROCHLORIDE 20 MG/ML
20 INJECTION INTRAMUSCULAR; INTRAVENOUS
Status: DISCONTINUED | OUTPATIENT
Start: 2020-04-15 | End: 2020-04-16 | Stop reason: HOSPADM

## 2020-04-15 RX ORDER — VERAPAMIL HYDROCHLORIDE 2.5 MG/ML
INJECTION, SOLUTION INTRAVENOUS AS NEEDED
Status: DISCONTINUED | OUTPATIENT
Start: 2020-04-15 | End: 2020-04-15 | Stop reason: HOSPADM

## 2020-04-15 RX ORDER — MIDAZOLAM HYDROCHLORIDE 1 MG/ML
INJECTION, SOLUTION INTRAMUSCULAR; INTRAVENOUS AS NEEDED
Status: DISCONTINUED | OUTPATIENT
Start: 2020-04-15 | End: 2020-04-15 | Stop reason: HOSPADM

## 2020-04-15 RX ORDER — CLOPIDOGREL BISULFATE 75 MG/1
75 TABLET ORAL DAILY
Status: DISCONTINUED | OUTPATIENT
Start: 2020-04-15 | End: 2020-04-15 | Stop reason: SDUPTHER

## 2020-04-15 RX ADMIN — SODIUM CHLORIDE 100 ML/HR: 900 INJECTION, SOLUTION INTRAVENOUS at 14:33

## 2020-04-15 RX ADMIN — LOSARTAN POTASSIUM 50 MG: 50 TABLET, FILM COATED ORAL at 17:44

## 2020-04-15 RX ADMIN — HYDROXYCHLOROQUINE SULFATE 200 MG: 200 TABLET, FILM COATED ORAL at 17:44

## 2020-04-15 RX ADMIN — HEPARIN SODIUM 552.8 UNITS/HR: 10000 INJECTION, SOLUTION INTRAVENOUS at 12:21

## 2020-04-15 RX ADMIN — LOSARTAN POTASSIUM 50 MG: 25 TABLET ORAL at 08:14

## 2020-04-15 RX ADMIN — PANTOPRAZOLE SODIUM 40 MG: 40 TABLET, DELAYED RELEASE ORAL at 08:14

## 2020-04-15 RX ADMIN — AMLODIPINE BESYLATE 10 MG: 10 TABLET ORAL at 08:14

## 2020-04-15 RX ADMIN — HYDROXYCHLOROQUINE SULFATE 200 MG: 200 TABLET, FILM COATED ORAL at 08:14

## 2020-04-15 RX ADMIN — ATORVASTATIN CALCIUM 80 MG: 40 TABLET, FILM COATED ORAL at 22:59

## 2020-04-15 RX ADMIN — CALCIUM CARBONATE (ANTACID) CHEW TAB 500 MG 200 MG: 500 CHEW TAB at 17:44

## 2020-04-15 RX ADMIN — METOPROLOL SUCCINATE 25 MG: 25 TABLET, EXTENDED RELEASE ORAL at 08:15

## 2020-04-15 RX ADMIN — GABAPENTIN 400 MG: 400 CAPSULE ORAL at 08:15

## 2020-04-15 RX ADMIN — CALCIUM CARBONATE (ANTACID) CHEW TAB 500 MG 200 MG: 500 CHEW TAB at 08:14

## 2020-04-15 RX ADMIN — GABAPENTIN 400 MG: 400 CAPSULE ORAL at 22:59

## 2020-04-15 RX ADMIN — GABAPENTIN 400 MG: 400 CAPSULE ORAL at 17:44

## 2020-04-15 RX ADMIN — Medication 10 ML: at 23:01

## 2020-04-15 RX ADMIN — ASPIRIN 81 MG: 81 TABLET, COATED ORAL at 08:14

## 2020-04-15 RX ADMIN — VENLAFAXINE HYDROCHLORIDE 37.5 MG: 37.5 CAPSULE, EXTENDED RELEASE ORAL at 08:16

## 2020-04-15 NOTE — ED NOTES
Bedside and verbal shift report given by Gabriella Alexander RN (off going nurse) to Temitope Lopez RN (oncoming nurse). Report included the following information SBAR and ED Summary.

## 2020-04-15 NOTE — PROGRESS NOTES
1637: Verbal shift change report given to [place] [id number] (oncoming nurse) by Harpal Cook RN (offgoing nurse). Report included the following information SBAR, Procedure Summary, MAR and Cardiac Rhythm NSR.   1730: Pt arrived to unit. R wrist dressing clean, dry and intact without evidence of bleeding or hematoma. 1905: Bedside and Verbal shift change report given to [address] [place] [address] (oncoming nurse) by Blanca Heredia RN (offgoing nurse). Report included the following information SBAR, Kardex, Intake/Output, MAR and Cardiac Rhythm NSR.

## 2020-04-15 NOTE — PROGRESS NOTES
TRANSFER - IN REPORT:    Verbal report received from Rosana(name) on Silvino Ramírez  being received from cath lab(unit) for routine post - op      Report consisted of patients Situation, Background, Assessment and   Recommendations(SBAR). Information from the following report(s) SBAR, Procedure Summary, Intake/Output, MAR and Recent Results was reviewed with the receiving nurse. Opportunity for questions and clarification was provided. Assessment completed upon patients arrival to unit and care assumed.        DSTAT in place to R wrist.

## 2020-04-15 NOTE — PROGRESS NOTES
Cath holding summary     Patient escorted to cath holding from ED, alert and oriented x 4, voicing no complaints. Changed into gown and placed on monitor. NPO since MN. Lab results, med rec and H&P reviewed on chart. PIV x 2 flushed without difficulty. Bilateral groins prepped.

## 2020-04-15 NOTE — PROGRESS NOTES
TRANSFER - IN REPORT:    Verbal report received from Edwige(name) on [employer]  being received from ED(unit) for ordered procedure      Report consisted of patients Situation, Background, Assessment and   Recommendations(SBAR). Information from the following report(s) SBAR, Intake/Output, MAR and Recent Results was reviewed with the receiving nurse. Opportunity for questions and clarification was provided. Assessment completed upon patients arrival to unit and care assumed.

## 2020-04-15 NOTE — PROGRESS NOTES
TRANSFER - OUT REPORT:    Verbal report given to Katy(name) on Kristin Grad  being transferred to CVT stepdown(unit) for routine progression of care       Report consisted of patients Situation, Background, Assessment and   Recommendations(SBAR). Information from the following report(s) SBAR, Procedure Summary, Intake/Output, MAR and Recent Results was reviewed with the receiving nurse. Lines:   Peripheral IV 04/15/20 Left Antecubital (Active)   Site Assessment Clean, dry, & intact 4/15/2020  7:52 AM   Phlebitis Assessment 0 4/15/2020  7:52 AM   Infiltration Assessment 0 4/15/2020  7:52 AM   Dressing Status Clean, dry, & intact 4/15/2020  7:52 AM   Dressing Type Transparent 4/15/2020  7:52 AM   Hub Color/Line Status Pink 4/15/2020  7:52 AM       Peripheral IV 04/15/20 Left Hand (Active)   Site Assessment Clean, dry, & intact 4/15/2020  7:52 AM   Phlebitis Assessment 0 4/15/2020  7:52 AM   Infiltration Assessment 0 4/15/2020  7:52 AM   Dressing Status Clean, dry, & intact 4/15/2020  7:52 AM   Dressing Type Transparent 4/15/2020  7:52 AM   Hub Color/Line Status Pink 4/15/2020  7:52 AM        Opportunity for questions and clarification was provided. Dressing to R wrist is clean, dry, and intact.

## 2020-04-15 NOTE — PROGRESS NOTES
conducted an initial consultation and Spiritual Assessment for Bryanna, who is a 64 y.o.,female. Patients Primary Language is: [place]. According to the patients EMR Uats[employer] Affiliation is: Martha Amelia.     The reason the Patient came to the hospital is:   Patient Active Problem List    Diagnosis Date Noted    Hypokalemia 04/14/2020    Chest pain 04/14/2020    CAD (coronary artery disease) 04/14/2020    Osteoarthritis of right knee 09/22/2016    Sleep apnea 08/22/2016    Dyslipidemia, goal LDL below 70 04/26/2016    Type 2 diabetes mellitus without complication ([address] 96/00/8805    Coronary artery disease of native artery of native heart with stable ang[facility] pectoris ([facility] 04/26/2016    S/P coronary artery stent placement 04/26/2016    Smoker 04/26/2016    NSTEMI (non-ST elevated myocardial infarction) ([facility] 06/02/2015    Knee pain, bilateral 11/06/2014    Encounter for long-term (current) use of other medications 11/06/2014    Chronic pain syndrome 11/06/2014    Generalized OA 11/06/2014    Polyarthralgia 11/06/2014    History of depression 11/06/2014    Marijuana use 11/06/2014    Essential hypertension, benign     Other and unspecified hyperlipidemia         The  provided the following Interventions:  Initiated a relationship of care and support. Explored issues of natali, belief, spirituality and Yarsani/ritual needs while hospitalized. Listened empathically. Patient shared that this is her third \"heart attack\" (2015, 2018, 2020) that caused so much pain greater than her last one. Provided chaplaincy education. Provided information about Spiritual Care Services. Offered prayer and assurance of continued prayers on patient's behalf. Chart reviewed. The following outcomes where achieved:  Patient shared limited information about both her medical narrative and spiritual journey/beliefs.  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  confirmed Patient's Yarsanism Affiliation with [address]  but she wasn't [employer] Her parents were Episcopalians so she remains to be like them on records. Patient processed feeling about current hospitalization. Patient expressed gratitude for 's visit. Assessment:  Patient does not have any Sikh/cultural needs that will affect patients preferences in health care. There are no spiritual or Sikh issues which require intervention at this time. Plan:  Chaplains will continue to follow and will provide pastoral care on an as needed/requested basis.  recommends bedside caregivers page  on duty if patient shows signs of acute spiritual or emotional distress.     [address] [facility]   [phone]

## 2020-04-15 NOTE — CONSULTS
[facility] - Consult Note    Date of  Admission: 4/14/2020  3:37 PM     Primary Care Physician:  Dorothea Concepcion MD     Plan:     1. Acute NSTEMI- with elevated troponin and up trending. Patient is chest pain free. No SOB no CHF symptoms. Continue heparin drip, asa,bb,statin and Plavix. Obtain echo to reassess lvf, rvf or any wma. Will obtain cardiac catheterization today to assess CAD progression discussed with patient risk and benefits and she wants to proceed     2. CAD-  s/p cath 6/2/15 Dr. Kaylin Ryan - patent LM, 70% mid LCx, LAD occluded proximally just after first septal, patent Ramus, 75% ostial and 80% mid PDA, LAD filled via slow collaterals from RCA s/p PCI of LAD using 3.0 x 28 Xience EDMUNDO. Last cath 3/9/18:  LAD 20% plaque, R PDA 95% ostial s/p 2.5x16mm Synergy EDMUNDO.  EF 60% on LVgram  3. Hx of NST 3/31/16 - EF 72%, no ischemia of infarct  4. H/o ischemic CMO- last echo 3/9/2018: LVH with normal normal LV chamber size, mild diastolic dysfunction, EF 69%  5. Hypertension- well controlled   6. Dyslipidemia- elevated LDL increased Lipitor to 80 mg   7. Diabetes mellitus-medications per medical team    8. GERD  9. Hx of  gastric bypass  10. Tobacco abuse-Patient advised to stop smoking to reduce future cardiovascular events. The benefits and risks of cardiac catheterization have been discussed in detail with the patient present at the time. Patient understands risk of potential cath complications including but not limited to bleeding, infection, difficulty healing the arteriotomy access site(2 chances in 100) which may require surgical repair, potential thromboembolic complications which could result in stroke, myocardial infarction, loss of limb or organ function and/or death( 1 chance in 1000)and potential allergic reaction to contrast dye or other medication used during the procedure.  Patient is also aware of the therapeutic implications for medical management vs coronary artery bypass surgery vs percutaneous coronary intervention in treatment of coronary artery disease. The additional risks for percutaneous coronary artery intervention include the need for emergent bypass surgery at 1 chance in 100 and for restenosis which may range from 35% for plain balloon angioplasty, 15% for bare metal stent, and 5% for drug eluting stent implants. The need for mandatory uninterrupted dual antiplatelet therapy with lifelong Aspirin combined with Plavix for up to 12 months following drug eluting stents, and minimum 1 month following bare metal stents to prevent stent thrombosis which is the equivalent of acute heart attack has been reviewed in detail. No contraindications to use of drug eluting stents has been discovered.     Acute non-STEMI in a patient with known CAD and significant multiple risk factors including hypertension, diabetes, severe hyperlipidemia (possibly familial) and continuing cigarette smoking. Physical exam is without any signs of CHF and the distal pulses are well palpable. No significant abnormal heart sounds or murmurs. Has normal renal function. Continue present medications and increase Lipitor to 80 mg a day. She will need PCSK9 inhibitors also as LDL is significantly elevated despite Lipitor 40. Would recommend cardiac catheterization to evaluate coronary anatomy and further revascularization with either PCI or CABG as needed. Discussed with patient and explained and she agrees. Will proceed today and keep her n.p.o. for now. Echo 2018   NORMAL LEFT VENTRICULAR CAVITY SIZE. MODERATE LEFT VENTRICULAR HYPERTROPHY PRESENT. HYPERCONTRACTILE LEFT VENTRICULAR FUNCTION. NO WALL MOTION ABNORMALITIES. MILD (GRADE I)   DIASTOLIC DYSFUNCTION. ESTIMATED EJECTION FRACTION OF 75%   NORMAL RIGHT VENTRICULAR SIZE. NORMAL RIGHT VENTRICULAR GLOBAL SYSTOLIC FUNCTION. INSUFFICIENT TR TO ESTIMATE PULMONARY ARTERY PRESSURE. MILD AORTIC REGURGITATION.             XR Results (most recent):  Results from [place] encounter on 04/14/20   XR CHEST PORT    Narrative AP CHEST, PORTABLE    INDICATION: Chest pain    COMPARISON: Prior chest x-rays, most recent 1/2/2019    FINDINGS:  EKG leads overlie the patient. The cardiac silhouette is normal in size. The pulmonary vasculature is  unremarkable. No focal consolidation, pleural effusion, or pneumothorax. No  acute osseous abnormalities are identified. Impression Impression:    No acute finding. Assessment:     Hospital Problems  Date Reviewed: 9/26/2016          Codes Class Noted POA    Hypokalemia ICD-10-CM: E87.6  ICD-9-CM: 276.8  4/14/2020 Unknown        Chest pain ICD-10-CM: R07.9  ICD-9-CM: 786.50  4/14/2020 Unknown        CAD (coronary artery disease) ICD-10-CM: I25.10  ICD-9-CM: 414.00  4/14/2020 Unknown        NSTEMI (non-ST elevated myocardial infarction) [facility]) ICD-10-CM: I21.4  ICD-9-CM: 410.70  6/2/2015 Unknown                   History of Present Illness: This is a 64 y.o. female admitted for NSTEMI (non-ST elevated myocardial infarction) ([facility] Nora Dakins. 4]Chest pain [R07. 9]STEMI (non-ST elevated myocardial infarction) ([facility] [I21.4]Hypokalemia [E87.6]CAD (coronary artery disease) [I25.10]. Patient with PMHx of CAD, hypertension, diabetes, GERD. Presented to the ED with c/o  retrosternal chest heaviness started after 2 PM yesterday. Chest pain radiated to right jaw, neck,  right shoulder and upper back. Patient states she never had chest pressure like this even though she had 2 previous heart attacks. This was the first chest pressure she ever had. Associated symptoms dizziness, diaphoresis, nausea  and SOB  She has chronic arthritis, joint pain and tingling numbness due to rheumatoid arthritis. No recent sick contact. She takes aspirin, Plavix, statin, beta-blocker, losartan, Norvasc, Plaquenil, gabapentin. Denies drinking alcohol. Smokes 3 to 4 cigarettes a day.   Her past medical history includes coronary artery disease, hypertension, GERD, osteoarthritis, right shoulder pain, previous coronary artery disease requiring stenting.      Past Medical History:     Past Medical History:   Diagnosis Date    Arthritis     CAD (coronary artery disease) 2015    Diabetes ([facility] 2015    Dyspnea on exertion     Essential hypertension, benign     GERD (gastroesophageal reflux disease)     H/O emotional problems     History of fall 07/01/14    Twisted right knee    Hypertension 1995    Osteoarthritis of right knee     Osteoarthritis of right knee 9/22/2016    Other and unspecified hyperlipidemia     Pre-operative cardiovascular examination     Rheumatoid arthritis ([facility]          Social History:     Social History     Socioeconomic History    Marital status:      Spouse name: Not on file    Number of children: Not on file    Years of education: Not on file    Highest education level: Not on file   Tobacco Use    Smoking status: Current Every Day Smoker     Packs/day: 0.25     Years: 1.00     Pack years: 0.25    Smokeless tobacco: Never Used    Tobacco comment: 10 per day per patient     Substance and Sexual Activity    Alcohol use: Yes     Alcohol/week: 0.0 standard drinks     Comment: rare 1-2 yr    Drug use: No    Sexual activity: Yes        Family History:     Family History   Problem Relation Age of Onset    Heart Disease Other         Medications:      Allergies   Allergen Reactions    Oxycodone Itching        Current Facility-Administered Medications   Medication Dose Route Frequency    heparin 25,000 units in D5W 250 ml infusion  12-25 Units/kg/hr IntraVENous TITRATE    ALPRAZolam (XANAX) tablet 0.25 mg  0.25 mg Oral TID PRN    acetaminophen (TYLENOL) tablet 650 mg  650 mg Oral Q6H PRN    amLODIPine (NORVASC) tablet 10 mg  10 mg Oral DAILY    aspirin delayed-release tablet 81 mg  81 mg Oral DAILY    atorvastatin (LIPITOR) tablet 40 mg  40 mg Oral QHS    cyclobenzaprine (FLEXERIL) tablet 5 mg  5 mg Oral TID PRN    diphenhydrAMINE (BENADRYL) capsule 25 mg  25 mg Oral Q6H PRN    gabapentin (NEURONTIN) capsule 400 mg  400 mg Oral TID    hydroxychloroquine (PLAQUENIL) tablet 200 mg  200 mg Oral BID    losartan (COZAAR) tablet 50 mg  50 mg Oral DAILY    metoprolol succinate (TOPROL-XL) XL tablet 25 mg  25 mg Oral DAILY    nitroglycerin (NITROSTAT) tablet 0.4 mg  0.4 mg SubLINGual PRN    pantoprazole (PROTONIX) tablet 40 mg  40 mg Oral ACB    oxyCODONE IR (ROXICODONE) tablet 5 mg  5 mg Oral Q6H PRN    alogliptin (NESINA) tablet 12.5 mg  12.5 mg Oral DAILY    venlafaxine-SR (EFFEXOR-XR) capsule 37.5 mg  37.5 mg Oral DAILY    ondansetron (ZOFRAN) injection 4 mg  4 mg IntraVENous Q4H PRN    calcium carbonate (TUMS) chewable tablet 200 mg [elemental]  200 mg Oral TID WITH MEALS    diclofenac (FLECTOR) 1.3 % transdermal patch 1 Patch  1 Patch TransDERmal Q12H     Current Outpatient Medications   Medication Sig    acetaminophen (TYLENOL) 500 mg tablet Take 1,000 mg by mouth every eight (8) hours as needed for Pain.  amLODIPine (NORVASC) 10 mg tablet Take 10 mg by mouth daily.  hydroxychloroquine (PLAQUENIL) 200 mg tablet Take 200 mg by mouth two (2) times a day.  oxyCODONE IR (ROXICODONE) 5 mg immediate release tablet Take 5 mg by mouth every four (4) hours as needed for Pain.  venlafaxine-ER 24 HR (EFFEXOR-ER) 37.5 mg tr24 tablet Take 37.5 mg by mouth daily.  nitroglycerin (NITROSTAT) 0.4 mg SL tablet PLACE 1 TABLET UNDER THE TONGUE EVERY 5 MINUTES AS NEEDED FOR CHEST PAIN FOR UP TO 3 DOSES    promethazine (PHENERGAN) 25 mg tablet Take 1 Tab by mouth every six (6) hours as needed for Nausea.  HYDROcodone-acetaminophen (NORCO) 7.5-325 mg per tablet Take  by mouth.  cyclobenzaprine (FLEXERIL) 10 mg tablet Take  by mouth three (3) times daily as needed for Muscle Spasm(s).  omeprazole (PRILOSEC) 40 mg capsule Take 40 mg by mouth daily.     diphenhydrAMINE (BENADRYL) 25 mg capsule Take 25 mg by mouth every six (6) hours as needed for Itching.  oxyCODONE-acetaminophen (PERCOCET) 5-325 mg per tablet Take 1 to 2 tab PO q 4-6 hrs prn pain (Patient taking differently: Take 2 Tabs by mouth every six (6) hours as needed for Pain.)    ALPRAZolam (XANAX) 0.5 mg tablet Take 0.5 mg by mouth three (3) times daily as needed for Anxiety.  sitaGLIPtin (JANUVIA) 100 mg tablet Take 100 mg by mouth daily.  metoprolol succinate (TOPROL-XL) 25 mg XL tablet Take 1 Tab by mouth daily.  losartan (COZAAR) 50 mg tablet Take 1 Tab by mouth daily.  atorvastatin (LIPITOR) 40 mg tablet Take 1 Tab by mouth nightly. (Patient taking differently: Take 40 mg by mouth daily.)    clopidogrel (PLAVIX) 75 mg tablet Take 1 Tab by mouth daily.  aspirin delayed-release 81 mg tablet Take 1 Tab by mouth daily.  gabapentin (NEURONTIN) 600 mg tablet Take 600 mg by mouth three (3) times daily. Review Of Systems:           Constitutional: No fever, no chills, no weight loss, no night sweats   HEENT: No epistaxis, no nasal drainage, no difficulty in swallowing, no redness in eyes  Respiratory:dyspnea on exertion  Cardiovascular: chest pain, no chest pressure,dyspnea, dizziness. Gastrointestinal: no abd pain, no vomiting, no diarrhea, no bleeding symptoms  Genitourinary: No urinary symptoms or hematuria  Integument/breast: No ulcers or rashes  Musculoskeletal: join pain.  Right shoulder pain   Neurological: No focal weakness, no seizures, no headaches  Behvioral/Psych: No anxiety, no depression           Physical Exam:     Visit Vitals  /72   Pulse (!) 57   Temp 97.9 °F (36.6 °C)   Resp 14   Ht 5' 6\" (1.676 m)   Wt 79.4 kg (175 lb)   SpO2 99%   BMI 28.25 kg/m²     BP Readings from Last 3 Encounters:   04/15/20 156/72   02/04/20 154/80   01/31/20 (!) 169/92     Pulse Readings from Last 3 Encounters:   04/15/20 (!) 57   02/04/20 62   01/31/20 66     Wt Readings from Last 3 Encounters:   04/15/20 79.4 kg (175 lb)   01/20/20 73.5 kg (162 lb)   05/19/17 80.3 kg (177 lb)       General:  alert, cooperative, no distress, appears stated age, mildly obese  Skin: Warm and dry, acyanotic, normal color. Head: Normocephalic, atraumatic. Eyes: Sclerae anicteric, conjunctivae without injection. Neck:  nontender, no nuchal rigidity, no masses, no stridor, no carotid bruit, no JVD  Lungs:  Few expiratory , wheezes, diminished breath sounds b/l. Heart:  regular rate and rhythm, S1, S2 normal, no murmur, click, rub or gallop  Abdomen:  abdomen is soft without significant tenderness, masses, organomegaly or guarding  Extremities:  extremities normal, atraumatic, no cyanosis or edema. Peripheral pulses present   Neurological: grossly intact. No focal abnormalities, moves all extremities well. Psychiatric Affect: The patient is awake, alert and oriented x3. Janie Quiroz is interactive and appropriate. Data Review:     Recent Results (from the past 48 hour(s))   CBC WITH AUTOMATED DIFF    Collection Time: 04/14/20  3:25 PM   Result Value Ref Range    WBC 12.1 4.6 - 13.2 K/uL    RBC 4.55 4.20 - 5.30 M/uL    HGB 14.7 12.0 - 16.0 g/dL    HCT 43.7 35.0 - 45.0 %    MCV 96.0 74.0 - 97.0 FL    MCH 32.3 24.0 - 34.0 PG    MCHC 33.6 31.0 - 37.0 g/dL    RDW 12.4 11.6 - 14.5 %    PLATELET 921 689 - 103 K/uL    MPV 10.5 9.2 - 11.8 FL    NEUTROPHILS 69 40 - 73 %    LYMPHOCYTES 22 21 - 52 %    MONOCYTES 6 3 - 10 %    EOSINOPHILS 2 0 - 5 %    BASOPHILS 1 0 - 2 %    ABS. NEUTROPHILS 8.4 (H) 1.8 - 8.0 K/UL    ABS. LYMPHOCYTES 2.7 0.9 - 3.6 K/UL    ABS. MONOCYTES 0.7 0.05 - 1.2 K/UL    ABS. EOSINOPHILS 0.3 0.0 - 0.4 K/UL    ABS.  BASOPHILS 0.1 0.0 - 0.1 K/UL    DF AUTOMATED     METABOLIC PANEL, COMPREHENSIVE    Collection Time: 04/14/20  3:25 PM   Result Value Ref Range    Sodium 141 136 - 145 mmol/L    Potassium 3.1 (L) 3.5 - 5.5 mmol/L    Chloride 105 100 - 111 mmol/L    CO2 27 21 - 32 mmol/L    Anion gap 9 3.0 - 18 mmol/L    Glucose 199 (H) 74 - 99 mg/dL    BUN 15 7.0 - 18 MG/DL    Creatinine 0.92 0.6 - 1.3 MG/DL    BUN/Creatinine ratio 16 12 - 20      GFR est AA >60 >60 ml/min/1.73m2    GFR est non-AA >60 >60 ml/min/1.73m2    Calcium 8.7 8.5 - 10.1 MG/DL    Bilirubin, total 0.3 0.2 - 1.0 MG/DL    ALT (SGPT) 15 13 - 56 U/L    AST (SGOT) 10 10 - 38 U/L    Alk.  phosphatase 96 45 - 117 U/L    Protein, total 7.6 6.4 - 8.2 g/dL    Albumin 3.9 3.4 - 5.0 g/dL    Globulin 3.7 2.0 - 4.0 g/dL    A-G Ratio 1.1 0.8 - 1.7     CARDIAC PANEL,(CK, CKMB & TROPONIN)    Collection Time: 04/14/20  3:25 PM   Result Value Ref Range    CK 57 26 - 192 U/L    CK - MB <1.0 <3.6 ng/ml    CK-MB Index  0.0 - 4.0 %     CALCULATION NOT PERFORMED WHEN RESULT IS BELOW LINEAR LIMIT    Troponin-I, QT <0.02 0.0 - 0.045 NG/ML   EKG, 12 LEAD, INITIAL    Collection Time: 04/14/20  3:27 PM   Result Value Ref Range    Ventricular Rate 63 BPM    Atrial Rate 63 BPM    P-R Interval 182 ms    QRS Duration 90 ms    Q-T Interval 424 ms    QTC Calculation (Bezet) 433 ms    Calculated P Axis 65 degrees    Calculated R Axis 21 degrees    Calculated T Axis 83 degrees    Diagnosis       Poor data quality, interpretation may be adversely affected  Sinus rhythm with premature atrial complexes  Otherwise normal ECG  When compared with ECG of 19-MAY-2017 08:47,  premature atrial complexes are now present  Nonspecific T wave abnormality now evident in Anterior leads  Confirmed by Emir Bernal ([id number]) on 4/14/2020 4:17:16 PM  Also confirmed by Emir Bernal ([id number]),  Dot Goncalves ([id number])  on   4/15/2020 7:53:23 AM     CARDIAC PANEL,(CK, CKMB & TROPONIN)    Collection Time: 04/14/20  6:23 PM   Result Value Ref Range    CK 84 26 - 192 U/L    CK - MB 3.6 (H) <3.6 ng/ml    CK-MB Index 4.3 (H) 0.0 - 4.0 %    Troponin-I, QT 0.86 (H) 0.0 - 0.045 NG/ML   PTT    Collection Time: 04/14/20  7:37 PM   Result Value Ref Range    aPTT 21.8 (L) 23.0 - 36.4 SEC   CARDIAC PANEL,(CK, CKMB & TROPONIN)    Collection Time: 04/14/20  8:46 PM   Result Value Ref Range     26 - 192 U/L    CK - MB 6.9 (H) <3.6 ng/ml    CK-MB Index 6.3 (H) 0.0 - 4.0 %    Troponin-I, QT 2.16 (HH) 0.0 - 0.045 NG/ML   PTT    Collection Time: 04/15/20  2:15 AM   Result Value Ref Range    aPTT >180.0 () 23.0 - 36.4 SEC   CBC WITH AUTOMATED DIFF    Collection Time: 04/15/20  2:15 AM   Result Value Ref Range    WBC 10.5 4.6 - 13.2 K/uL    RBC 4.74 4.20 - 5.30 M/uL    HGB 14.9 12.0 - 16.0 g/dL    HCT 44.4 35.0 - 45.0 %    MCV 93.7 74.0 - 97.0 FL    MCH 31.4 24.0 - 34.0 PG    MCHC 33.6 31.0 - 37.0 g/dL    RDW 12.3 11.6 - 14.5 %    PLATELET 167 846 - 851 K/uL    MPV 10.0 9.2 - 11.8 FL    NEUTROPHILS 67 40 - 73 %    LYMPHOCYTES 26 21 - 52 %    MONOCYTES 5 3 - 10 %    EOSINOPHILS 2 0 - 5 %    BASOPHILS 0 0 - 2 %    ABS. NEUTROPHILS 7.0 1.8 - 8.0 K/UL    ABS. LYMPHOCYTES 2.7 0.9 - 3.6 K/UL    ABS. MONOCYTES 0.6 0.05 - 1.2 K/UL    ABS. EOSINOPHILS 0.2 0.0 - 0.4 K/UL    ABS.  BASOPHILS 0.0 0.0 - 0.1 K/UL    DF AUTOMATED     MAGNESIUM    Collection Time: 04/15/20  2:15 AM   Result Value Ref Range    Magnesium 2.1 1.6 - 2.6 mg/dL   METABOLIC PANEL, BASIC    Collection Time: 04/15/20  2:15 AM   Result Value Ref Range    Sodium 140 136 - 145 mmol/L    Potassium 3.9 3.5 - 5.5 mmol/L    Chloride 107 100 - 111 mmol/L    CO2 27 21 - 32 mmol/L    Anion gap 6 3.0 - 18 mmol/L    Glucose 167 (H) 74 - 99 mg/dL    BUN 11 7.0 - 18 MG/DL    Creatinine 0.75 0.6 - 1.3 MG/DL    BUN/Creatinine ratio 15 12 - 20      GFR est AA >60 >60 ml/min/1.73m2    GFR est non-AA >60 >60 ml/min/1.73m2    Calcium 8.8 8.5 - 10.1 MG/DL   CARDIAC PANEL,(CK, CKMB & TROPONIN)    Collection Time: 04/15/20  2:15 AM   Result Value Ref Range     26 - 192 U/L    CK - MB 8.0 (H) <3.6 ng/ml    CK-MB Index 6.7 (H) 0.0 - 4.0 %    Troponin-I, QT 2.07 (HH) 0.0 - 0.045 NG/ML   TSH 3RD GENERATION    Collection Time: 04/15/20  2:15 AM   Result Value Ref Range    TSH 3.99 (H) 0.36 - 3. 74 uIU/mL   LIPID PANEL    Collection Time: 04/15/20  2:15 AM   Result Value Ref Range    LIPID PROFILE          Cholesterol, total 352 (H) <200 MG/DL    Triglyceride 206 (H) <150 MG/DL    HDL Cholesterol 61 (H) 40 - 60 MG/DL    LDL, calculated 249.8 (H) 0 - 100 MG/DL    VLDL, calculated 41.2 MG/DL    CHOL/HDL Ratio 5.8 (H) 0 - 5.0     ECHO ADULT COMPLETE    Collection Time: 04/15/20  9:26 AM   Result Value Ref Range    LA Volume 45.36 22 - 52 mL    Ao Root D 3.12 cm    LVIDd 4.26 3.9 - 5.3 cm    LVPWd 1.20 (A) 0.6 - 0.9 cm    LVIDs 2.52 cm    IVSd 1.20 (A) 0.6 - 0.9 cm    LVOT d 1.68 cm    MV A Etvin 101.99 cm/s    MV E Tevin 82.36 cm/s    MV E/A 0.80     LA Vol 4C 42.64 22 - 52 mL    LA Vol 2C 41.62 22 - 52 mL    LA Area 4C 17.7 cm2    LV Mass .4 (A) 67 - 162 g    LV Mass AL Index 94.3 43 - 95 g/m2    Mitral Valve E Wave Deceleration Time 247.0 ms    Triscuspid Valve Regurgitation Peak Gradient 11.6 mmHg    TR Max Velocity 170.54 cm/s    LA Vol Index 24.00 16 - 28 ml/m2    LA Vol Index 22.02 16 - 28 ml/m2    LA Vol Index 22.56 16 - 28 ml/m2    Left Ventricular Fractional Shortening by 2D 91.46987586 %    Mitral Valve Deceleration [place] 3.0199440124     Left Ventricular End Diastolic Volume by Teichholz Method 7.14129517346485 mL    Left Ventricular End Systolic Volume by Teichholz Method 5.4937572619 mL    Left Ventricular Stroke Volume by Teichholz Method 01.87366602 mL       No intake or output data in the 24 hours ending 04/15/20 0943    Cardiographics:       EKG Results     Procedure 720 Value Units Date/Time    EKG, 12 LEAD, INITIAL [[id number]] Collected:  04/14/20 1527    Order Status:  Completed Updated:  04/15/20 0753     Ventricular Rate 63 BPM      Atrial Rate 63 BPM      P-R Interval 182 ms      QRS Duration 90 ms      Q-T Interval 424 ms      QTC Calculation (Bezet) 433 ms      Calculated P Axis 65 degrees      Calculated R Axis 21 degrees      Calculated T Axis 83 degrees      Diagnosis -- Poor data quality, interpretation may be adversely affected  Sinus rhythm with premature atrial complexes  Otherwise normal ECG  When compared with ECG of 19-MAY-2017 08:47,  premature atrial complexes are now present  Nonspecific T wave abnormality now evident in Anterior leads  Confirmed by Bishop Monk ([phone]) on 4/14/2020 4:17:16 PM  Also confirmed by Bishop Monk ([id number]),  Farhan Cardenas ([id number])  on   4/15/2020 7:53:23 AM      EKG, 12 LEAD, SUBSEQUENT [[id number]]     Order Status:  Sent                Signed By: Selina Carmichael NP-C Phone [phone]    April 15, 2020      I have independently evaluated and examined the patient. All relevant labs and testing data are reviewed. Care plan discussed and updated after review.   Daniel Dixon MD

## 2020-04-15 NOTE — PROGRESS NOTES
Problem: Falls - Risk of  Goal: *Absence of Falls  Description: Document Leafy Crane Fall Risk and appropriate interventions in the flowsheet.   Outcome: Progressing Towards Goal  Note: Fall Risk Interventions:            Medication Interventions: Bed/chair exit alarm, Evaluate medications/consider consulting pharmacy, Patient to call before getting OOB, Teach patient to arise slowly                   Problem: Patient Education: Go to Patient Education Activity  Goal: Patient/Family Education  Outcome: Progressing Towards Goal

## 2020-04-15 NOTE — PROGRESS NOTES
SUBJECTIVE:    Patient was seen in echocardiogram room. Chest pain is getting better. No shortness of breath or cough. No headaches or dizziness. Right shoulder pain is present but getting better. No nausea vomiting or abdominal pain. No tingling or numbness. OBJECTIVE:    /72   Pulse (!) 57   Temp 97.9 °F (36.6 °C)   Resp 14   Ht 5' 6\" (1.676 m)   Wt 79.4 kg (175 lb)   SpO2 99%   BMI 28.25 kg/m²     General appearance - alert, well appearing, and in no distress  Eyes - sclera anicteric, no pallor  Nose - no obvious nasal discharge. Neck - supple, no JVD, trachea is midline  Chest -clear air entry noted in bases, no wheezes  Heart - S1 and S2 normal  Abdomen - soft, nontender, nondistended, Bowel sounds present  Neurological - alert, oriented, normal speech, no focal findings noted  Musculoskeletal - no joint tenderness or swelling of knees  Extremities - no pedal edema noted    ASSESSMENT:    1. Chest pain, improving  2. Non-STEMI  3. Coronary artery disease with previous stenting in 2018  4. Hypertension  5. Dyslipidemia  6. GERD  7. Rheumatoid arthritis  8. Depression  9. Chronic neuropathy  10. Chronic pain syndrome  11. Right shoulder pain most likely due to degenerative joint disease    PLAN:    Continue current management  Cardiology input noted about cardiac cath  Continue heparin drip  Pending echocardiogram report    Disclaimer: Sections of this note are dictated using utilizing voice recognition software, which may have resulted in some phonetic based errors in grammar and contents. Even though attempts were made to correct all the mistakes, some may have been missed, and remained in the body of the document. If questions arise, please contact our department.     BMP:   Lab Results   Component Value Date/Time     04/15/2020 02:15 AM    K 3.9 04/15/2020 02:15 AM     04/15/2020 02:15 AM    CO2 27 04/15/2020 02:15 AM    AGAP 6 04/15/2020 02:15 AM     (H) 04/15/2020 02:15 AM    BUN 11 04/15/2020 02:15 AM    CREA 0.75 04/15/2020 02:15 AM    GFRAA >60 04/15/2020 02:15 AM    GFRNA >60 04/15/2020 02:15 AM     CBC:   Lab Results   Component Value Date/Time    WBC 10.5 04/15/2020 02:15 AM    HGB 14.9 04/15/2020 02:15 AM    HCT 44.4 04/15/2020 02:15 AM     04/15/2020 02:15 AM     All Cardiac Markers in the last 24 hours:   Lab Results   Component Value Date/Time     04/15/2020 02:15 AM     04/14/2020 08:46 PM    CPK 84 04/14/2020 06:23 PM    CPK 57 04/14/2020 03:25 PM    CKMB 8.0 (H) 04/15/2020 02:15 AM    CKMB 6.9 (H) 04/14/2020 08:46 PM    CKMB 3.6 (H) 04/14/2020 06:23 PM    CKMB <1.0 04/14/2020 03:25 PM    CKND1 6.7 (H) 04/15/2020 02:15 AM    CKND1 6.3 (H) 04/14/2020 08:46 PM    CKND1 4.3 (H) 04/14/2020 06:23 PM    CKND1  04/14/2020 03:25 PM     CALCULATION NOT PERFORMED WHEN RESULT IS BELOW LINEAR LIMIT    TROIQ 2.07 ([facility]) 04/15/2020 02:15 AM    TROIQ 2.16 ([facility]) 04/14/2020 08:46 PM    TROIQ 0.86 (H) 04/14/2020 06:23 PM    TROIQ <0.02 04/14/2020 03:25 PM

## 2020-04-15 NOTE — PROGRESS NOTES
DSTAT removed from R radial. Pressure held for 10 mins. No bleeding or hematoma noted. Marquez Thompson and tegaderm in place. Dressing is clean, dry and intact.

## 2020-04-15 NOTE — PROGRESS NOTES
TRANSFER - OUT REPORT:    Verbal report given to Rosana(name) on Felicia Giordano  being transferred to cath lab(unit) for ordered procedure       Report consisted of patients Situation, Background, Assessment and   Recommendations(SBAR). Information from the following report(s) SBAR, Intake/Output, MAR and Recent Results was reviewed with the receiving nurse. Lines:   Peripheral IV 04/15/20 Left Antecubital (Active)   Site Assessment Clean, dry, & intact 4/15/2020  7:52 AM   Phlebitis Assessment 0 4/15/2020  7:52 AM   Infiltration Assessment 0 4/15/2020  7:52 AM   Dressing Status Clean, dry, & intact 4/15/2020  7:52 AM   Dressing Type Transparent 4/15/2020  7:52 AM   Hub Color/Line Status Pink 4/15/2020  7:52 AM       Peripheral IV 04/15/20 Left Hand (Active)   Site Assessment Clean, dry, & intact 4/15/2020  7:52 AM   Phlebitis Assessment 0 4/15/2020  7:52 AM   Infiltration Assessment 0 4/15/2020  7:52 AM   Dressing Status Clean, dry, & intact 4/15/2020  7:52 AM   Dressing Type Transparent 4/15/2020  7:52 AM   Hub Color/Line Status Pink 4/15/2020  7:52 AM        Opportunity for questions and clarification was provided.       Patient transported with:   [employer]

## 2020-04-15 NOTE — PROGRESS NOTES
Echocardiogram completed. Patient returned to room with armband in place. Report to follow.     [address] [facility]

## 2020-04-15 NOTE — H&P
History and Physical    PCP:  Carlo Noel MD    Chief complaint: Chest pain    HPI:     James Orellana is a 64 y.o.  female who presents with retrosternal chest heaviness started after 2 PM.  Patient states she never had chest pressure like this even though she had 2 previous heart attacks. This was the first chest pressure she ever had. No shortness of breath or nausea. No headaches or dizziness. No runny nose or watery eyes. No fever or chills. No tingling numbness. No abdominal pain or vomiting. No constipation. She has chronic arthritis, joint pain and tingling numbness due to rheumatoid arthritis. No recent sick contact. She takes aspirin, Plavix, statin, beta-blocker, losartan, Norvasc, Plaquenil, gabapentin. Denies drinking alcohol. Smokes 3 to 4 cigarettes a day. Her past medical history includes coronary artery disease, hypertension, GERD, osteoarthritis, right shoulder pain, previous coronary artery disease requiring stenting.     Past Medical History:   Diagnosis Date    Arthritis     CAD (coronary artery disease)     Diabetes ([address]     Dyspnea on exertion     Essential hypertension, benign     GERD (gastroesophageal reflux disease)     H/O emotional problems     History of fall 14    Twisted right knee    Hypertension     Osteoarthritis of right knee     Osteoarthritis of right knee 2016    Other and unspecified hyperlipidemia     Pre-operative cardiovascular examination     Rheumatoid arthritis ([address]       Past Surgical History:   Procedure Laterality Date    CARDIAC SURG PROCEDURE UNLIST      2 stents    HX APPENDECTOMY      HX  SECTION      HX GYN      endometrial ablation    HX HYSTEROSCOPY WITH ENDOMETRIAL ABLATION      HX KNEE ARTHROSCOPY Right     HX LAP CHOLECYSTECTOMY      HX ORTHOPAEDIC      left foot spur removal     Family History   Problem Relation Age of Onset    Heart Disease Other Social History     Tobacco Use    Smoking status: Current Every Day Smoker     Packs/day: 0.25     Years: 1.00     Pack years: 0.25    Smokeless tobacco: Never Used    Tobacco comment: 10 per day per patient     Substance Use Topics    Alcohol use: Yes     Alcohol/week: 0.0 standard drinks     Comment: rare 1-2 yr       Prior to Admission medications    Medication Sig Start Date End Date Taking? Authorizing Provider   acetaminophen (TYLENOL) 500 mg tablet Take 1,000 mg by mouth every eight (8) hours as needed for Pain. Provider, Historical   amLODIPine (NORVASC) 10 mg tablet Take 10 mg by mouth daily. Provider, Historical   hydroxychloroquine (PLAQUENIL) 200 mg tablet Take 200 mg by mouth two (2) times a day. Provider, Historical   oxyCODONE IR (ROXICODONE) 5 mg immediate release tablet Take 5 mg by mouth every four (4) hours as needed for Pain. Provider, Historical   venlafaxine-ER 24 HR (EFFEXOR-ER) 37.5 mg tr24 tablet Take 37.5 mg by mouth daily. Provider, Historical   nitroglycerin (NITROSTAT) 0.4 mg SL tablet PLACE 1 TABLET UNDER THE TONGUE EVERY 5 MINUTES AS NEEDED FOR CHEST PAIN FOR UP TO 3 DOSES 6/7/18   Joseph Esposito NP   promethazine (PHENERGAN) 25 mg tablet Take 1 Tab by mouth every six (6) hours as needed for Nausea. 5/19/17   Christiano White MD   HYDROcodone-acetaminophen [facility]) 7.5-325 mg per tablet Take  by mouth. Provider, Historical   cyclobenzaprine (FLEXERIL) 10 mg tablet Take  by mouth three (3) times daily as needed for Muscle Spasm(s). Provider, Historical   omeprazole (PRILOSEC) 40 mg capsule Take 40 mg by mouth daily. Becca Oseguera MD   diphenhydrAMINE (BENADRYL) 25 mg capsule Take 25 mg by mouth every six (6) hours as needed for Itching. Provider, Historical   oxyCODONE-acetaminophen (PERCOCET) 5-325 mg per tablet Take 1 to 2 tab PO q 4-6 hrs prn pain  Patient taking differently: Take 2 Tabs by mouth every six (6) hours as needed for Pain.  9/26/16 OM[facility] Medrano   ALPRAZolam Danney Daniel) 0.5 mg tablet Take 0.5 mg by mouth three (3) times daily as needed for Anxiety. Provider, Historical   sitaGLIPtin (JANUVIA) 100 mg tablet Take 100 mg by mouth daily. Provider, Historical   metoprolol succinate (TOPROL-XL) 25 mg XL tablet Take 1 Tab by mouth daily. 6/4/15   Joanna Thomas MD   losartan (COZAAR) 50 mg tablet Take 1 Tab by mouth daily. 6/4/15   Joanna Thomas MD   atorvastatin (LIPITOR) 40 mg tablet Take 1 Tab by mouth nightly. Patient taking differently: Take 40 mg by mouth daily. 6/3/15   Joanna Thomas MD   clopidogrel (PLAVIX) 75 mg tablet Take 1 Tab by mouth daily. 6/3/15   Joanna Thomas MD   aspirin delayed-release 81 mg tablet Take 1 Tab by mouth daily. 6/3/15   Joanna Thomas MD   gabapentin (NEURONTIN) 600 mg tablet Take 600 mg by mouth three (3) times daily. Other, MD Eliud     Allergies   Allergen Reactions    Oxycodone Itching        Review of Systems:  A comprehensive review of systems was negative except for that written in the History of Present Illness. Objective: Intake and Output:    No intake/output data recorded. No intake/output data recorded. Physical Exam:     /86   Pulse 74   Temp 98.6 °F (37 °C)   Resp 23   Ht 5' 5.98\" (1.676 m)   Wt 79.4 kg (175 lb)   SpO2 99%   BMI 28.26 kg/m²     General appearance - alert, well appearing, and in no distress  Head -atraumatic, normocephalic  Eyes - sclera anicteric, no pallor  Nose - no obvious nasal discharge. Neck - supple, no JVD, trachea is midline  Chest -clear air entry noted in bases, no wheezes, no chest tenderness  Heart - S1 and S2 normal  Abdomen - soft, nontender, nondistended, Bowel sounds present  Neurological - alert, oriented, normal speech, no focal findings noted  Musculoskeletal - no joint tenderness or swelling of knees bilaterally  Extremities - no pedal edema noted      ECG: No acute ST-T wave changes to me.   Normal sinus rhythm otherwise. Data Review:   Recent Results (from the past 24 hour(s))   CBC WITH AUTOMATED DIFF    Collection Time: 04/14/20  3:25 PM   Result Value Ref Range    WBC 12.1 4.6 - 13.2 K/uL    RBC 4.55 4.20 - 5.30 M/uL    HGB 14.7 12.0 - 16.0 g/dL    HCT 43.7 35.0 - 45.0 %    MCV 96.0 74.0 - 97.0 FL    MCH 32.3 24.0 - 34.0 PG    MCHC 33.6 31.0 - 37.0 g/dL    RDW 12.4 11.6 - 14.5 %    PLATELET 791 050 - 839 K/uL    MPV 10.5 9.2 - 11.8 FL    NEUTROPHILS 69 40 - 73 %    LYMPHOCYTES 22 21 - 52 %    MONOCYTES 6 3 - 10 %    EOSINOPHILS 2 0 - 5 %    BASOPHILS 1 0 - 2 %    ABS. NEUTROPHILS 8.4 (H) 1.8 - 8.0 K/UL    ABS. LYMPHOCYTES 2.7 0.9 - 3.6 K/UL    ABS. MONOCYTES 0.7 0.05 - 1.2 K/UL    ABS. EOSINOPHILS 0.3 0.0 - 0.4 K/UL    ABS. BASOPHILS 0.1 0.0 - 0.1 K/UL    DF AUTOMATED     METABOLIC PANEL, COMPREHENSIVE    Collection Time: 04/14/20  3:25 PM   Result Value Ref Range    Sodium 141 136 - 145 mmol/L    Potassium 3.1 (L) 3.5 - 5.5 mmol/L    Chloride 105 100 - 111 mmol/L    CO2 27 21 - 32 mmol/L    Anion gap 9 3.0 - 18 mmol/L    Glucose 199 (H) 74 - 99 mg/dL    BUN 15 7.0 - 18 MG/DL    Creatinine 0.92 0.6 - 1.3 MG/DL    BUN/Creatinine ratio 16 12 - 20      GFR est AA >60 >60 ml/min/1.73m2    GFR est non-AA >60 >60 ml/min/1.73m2    Calcium 8.7 8.5 - 10.1 MG/DL    Bilirubin, total 0.3 0.2 - 1.0 MG/DL    ALT (SGPT) 15 13 - 56 U/L    AST (SGOT) 10 10 - 38 U/L    Alk.  phosphatase 96 45 - 117 U/L    Protein, total 7.6 6.4 - 8.2 g/dL    Albumin 3.9 3.4 - 5.0 g/dL    Globulin 3.7 2.0 - 4.0 g/dL    A-G Ratio 1.1 0.8 - 1.7     CARDIAC PANEL,(CK, CKMB & TROPONIN)    Collection Time: 04/14/20  3:25 PM   Result Value Ref Range    CK 57 26 - 192 U/L    CK - MB <1.0 <3.6 ng/ml    CK-MB Index  0.0 - 4.0 %     CALCULATION NOT PERFORMED WHEN RESULT IS BELOW LINEAR LIMIT    Troponin-I, QT <0.02 0.0 - 0.045 NG/ML   CARDIAC PANEL,(CK, CKMB & TROPONIN)    Collection Time: 04/14/20  6:23 PM   Result Value Ref Range    CK 84 26 - 192 U/L    CK - MB 3.6 (H) <3.6 ng/ml    CK-MB Index 4.3 (H) 0.0 - 4.0 %    Troponin-I, QT 0.86 (H) 0.0 - 0.045 NG/ML   PTT    Collection Time: 04/14/20  7:37 PM   Result Value Ref Range    aPTT 21.8 (L) 23.0 - 36.4 SEC       CXR Results  (Last 48 hours)               04/14/20 1557  XR CHEST PORT Final result    Impression:  Impression:     No acute finding. Narrative:  AP CHEST, PORTABLE       INDICATION: Chest pain       COMPARISON: Prior chest x-rays, most recent 1/2/2019       FINDINGS:  EKG leads overlie the patient. The cardiac silhouette is normal in size. The pulmonary vasculature is   unremarkable. No focal consolidation, pleural effusion, or pneumothorax. No   acute osseous abnormalities are identified. Assessment:     Active Problems:    NSTEMI (non-ST elevated myocardial infarction) ([facility] (6/2/2015)      Hypokalemia (4/14/2020)      Chest pain (4/14/2020)      CAD (coronary artery disease) (4/14/2020)      1. Chest pain  2. Non-STEMI  3. Coronary artery disease with previous stenting in 2018  4. Hypertension  5. Dyslipidemia  6. GERD  7. Rheumatoid arthritis  8. Depression  9. Chronic neuropathy  10. Chronic pain syndrome  11. Right shoulder pain most likely due to degenerative joint disease    Plan:     Patient will be admitted to telemetry floor. We will continue heparin drip. The case has been discussed by ED physician with cardiologist.  We will continue aspirin, statin, beta-blocker and losartan with Norvasc with holding parameters. Continue PPI for GERD. Will check lipid panel in the morning. We will continue Plaquenil for rheumatoid arthritis. Chest x-ray report reviewed. We will put patient on Flector topical patch for #11. We will continue monitoring cardiac enzymes and continue telemetry. Echocardiogram has been ordered. Patient wishes to be full code.     Total time to take care of this patient was greater than 65 minutes including reviewing chart, examining the patient, obtaining history from the patient, analyzing the data, coordinating the care with patient, nurses and ED physician, reviewing admission MAR and entering orders, and documentation. Disclaimer: Sections of this note are dictated using utilizing voice recognition software, which may have resulted in some phonetic based errors in grammar and contents. Even though attempts were made to correct all the mistakes, some may have been missed, and remained in the body of the document. If questions arise, please contact our department.

## 2020-04-15 NOTE — PROGRESS NOTES
Assumed care of alert and oriented female pt. Pt SR on monitor, HR 70. Denies chest pain. C/O right shoulder pain, states this is normal for her and she is pending a surgery to correct. NPO pending cardiology consult. Heparin gtt infusing as ordered.

## 2020-04-16 VITALS
HEART RATE: 77 BPM | BODY MASS INDEX: 27 KG/M2 | HEIGHT: 66 IN | DIASTOLIC BLOOD PRESSURE: 93 MMHG | TEMPERATURE: 98.5 F | OXYGEN SATURATION: 96 % | WEIGHT: 168 LBS | RESPIRATION RATE: 16 BRPM | SYSTOLIC BLOOD PRESSURE: 150 MMHG

## 2020-04-16 LAB
APTT PPP: 28.5 SEC (ref 23–36.4)
ATRIAL RATE: 56 BPM
BASOPHILS # BLD: 0 K/UL (ref 0–0.1)
BASOPHILS NFR BLD: 1 % (ref 0–2)
CALCULATED P AXIS, ECG09: 73 DEGREES
CALCULATED R AXIS, ECG10: 55 DEGREES
CALCULATED T AXIS, ECG11: 77 DEGREES
DIAGNOSIS, 93000: NORMAL
DIFFERENTIAL METHOD BLD: ABNORMAL
EOSINOPHIL # BLD: 0.2 K/UL (ref 0–0.4)
EOSINOPHIL NFR BLD: 3 % (ref 0–5)
ERYTHROCYTE [DISTWIDTH] IN BLOOD BY AUTOMATED COUNT: 12.4 % (ref 11.6–14.5)
HCT VFR BLD AUTO: 47 % (ref 35–45)
HGB BLD-MCNC: 16.4 G/DL (ref 12–16)
LYMPHOCYTES # BLD: 1.5 K/UL (ref 0.9–3.6)
LYMPHOCYTES NFR BLD: 21 % (ref 21–52)
MCH RBC QN AUTO: 33.1 PG (ref 24–34)
MCHC RBC AUTO-ENTMCNC: 34.9 G/DL (ref 31–37)
MCV RBC AUTO: 94.8 FL (ref 74–97)
MONOCYTES # BLD: 0.5 K/UL (ref 0.05–1.2)
MONOCYTES NFR BLD: 7 % (ref 3–10)
NEUTS SEG # BLD: 4.9 K/UL (ref 1.8–8)
NEUTS SEG NFR BLD: 68 % (ref 40–73)
P-R INTERVAL, ECG05: 194 MS
PLATELET # BLD AUTO: 317 K/UL (ref 135–420)
PMV BLD AUTO: 10.5 FL (ref 9.2–11.8)
Q-T INTERVAL, ECG07: 430 MS
QRS DURATION, ECG06: 74 MS
QTC CALCULATION (BEZET), ECG08: 414 MS
RBC # BLD AUTO: 4.96 M/UL (ref 4.2–5.3)
VENTRICULAR RATE, ECG03: 56 BPM
WBC # BLD AUTO: 7.2 K/UL (ref 4.6–13.2)

## 2020-04-16 PROCEDURE — 74011250637 HC RX REV CODE- 250/637: Performed by: INTERNAL MEDICINE

## 2020-04-16 PROCEDURE — 74011250636 HC RX REV CODE- 250/636: Performed by: INTERNAL MEDICINE

## 2020-04-16 PROCEDURE — 94762 N-INVAS EAR/PLS OXIMTRY CONT: CPT

## 2020-04-16 PROCEDURE — 36415 COLL VENOUS BLD VENIPUNCTURE: CPT

## 2020-04-16 PROCEDURE — 85025 COMPLETE CBC W/AUTO DIFF WBC: CPT

## 2020-04-16 PROCEDURE — 85730 THROMBOPLASTIN TIME PARTIAL: CPT

## 2020-04-16 RX ORDER — CLOPIDOGREL BISULFATE 75 MG/1
75 TABLET ORAL DAILY
Qty: 30 TAB | Refills: 0 | Status: SHIPPED | OUTPATIENT
Start: 2020-04-16

## 2020-04-16 RX ORDER — DICLOFENAC EPOLAMINE 0.01 G/1
1 PATCH TOPICAL EVERY 12 HOURS
Qty: 30 PATCH | Refills: 0 | Status: SHIPPED | OUTPATIENT
Start: 2020-04-16 | End: 2020-08-13 | Stop reason: ALTCHOICE

## 2020-04-16 RX ORDER — ATORVASTATIN CALCIUM 80 MG/1
80 TABLET, FILM COATED ORAL
Qty: 30 TAB | Refills: 0 | Status: SHIPPED | OUTPATIENT
Start: 2020-04-16 | End: 2022-08-23

## 2020-04-16 RX ORDER — LOSARTAN POTASSIUM 50 MG/1
50 TABLET ORAL 2 TIMES DAILY
Qty: 60 TAB | Refills: 0 | Status: SHIPPED | OUTPATIENT
Start: 2020-04-16

## 2020-04-16 RX ADMIN — Medication 10 ML: at 15:11

## 2020-04-16 RX ADMIN — GABAPENTIN 400 MG: 400 CAPSULE ORAL at 09:31

## 2020-04-16 RX ADMIN — ASPIRIN 81 MG: 81 TABLET, COATED ORAL at 09:32

## 2020-04-16 RX ADMIN — GABAPENTIN 400 MG: 400 CAPSULE ORAL at 15:10

## 2020-04-16 RX ADMIN — METOPROLOL SUCCINATE 25 MG: 25 TABLET, EXTENDED RELEASE ORAL at 09:32

## 2020-04-16 RX ADMIN — PANTOPRAZOLE SODIUM 40 MG: 40 TABLET, DELAYED RELEASE ORAL at 09:32

## 2020-04-16 RX ADMIN — AMLODIPINE BESYLATE 10 MG: 10 TABLET ORAL at 09:32

## 2020-04-16 RX ADMIN — CALCIUM CARBONATE (ANTACID) CHEW TAB 500 MG 200 MG: 500 CHEW TAB at 09:30

## 2020-04-16 RX ADMIN — VENLAFAXINE HYDROCHLORIDE 37.5 MG: 37.5 CAPSULE, EXTENDED RELEASE ORAL at 09:29

## 2020-04-16 RX ADMIN — CLOPIDOGREL BISULFATE 75 MG: 75 TABLET ORAL at 09:32

## 2020-04-16 RX ADMIN — ALOGLIPTIN 12.5 MG: 6.25 TABLET, FILM COATED ORAL at 09:28

## 2020-04-16 RX ADMIN — HYDROXYCHLOROQUINE SULFATE 200 MG: 200 TABLET, FILM COATED ORAL at 09:32

## 2020-04-16 RX ADMIN — Medication 10 ML: at 06:37

## 2020-04-16 RX ADMIN — CALCIUM CARBONATE (ANTACID) CHEW TAB 500 MG 200 MG: 500 CHEW TAB at 15:10

## 2020-04-16 RX ADMIN — HYDRALAZINE HYDROCHLORIDE 20 MG: 20 INJECTION INTRAMUSCULAR; INTRAVENOUS at 10:30

## 2020-04-16 RX ADMIN — LOSARTAN POTASSIUM 50 MG: 50 TABLET, FILM COATED ORAL at 09:29

## 2020-04-16 NOTE — DISCHARGE SUMMARY
Physician Discharge Summary       Patient: Khanh Zamora MRN: [id number]  SSN: [id number]    YOB: 1960  Age: 64 y.o. Sex: female    PCP: Tanya Cuadra MD    Allergies: Oxycodone    Admit date: 4/14/2020  Admitting Provider: Debrah Najjar, MD    Discharge date: 4/16/2020  Discharging Provider: Priscila Hernandes MD    * Admission Diagnoses: NSTEMI (non-ST elevated myocardial infarction) [facility]) [I21.4]  Chest pain [R07.9]  NSTEMI (non-ST elevated myocardial infarction) ([facility] [address] [I21.4]  Hypokalemia [E87.6]  CAD (coronary artery disease) [I25.10]  NSTEMI (non-ST elevated myocardial infarction) ([facility] [address] [I21.4]    * Discharge Diagnoses:      1.  Chest pain, resolved  2.  Non-STEMI due to small branch vessel disease, status post treatment  3.  Coronary artery disease status post stenting in the past  4.  Hypertension  5.  Dyslipidemia  6.  GERD  7.  Rheumatoid arthritis  8.  Depression  9.  Chronic neuropathy  10.  Chronic pain syndrome  11.  Right shoulder pain most likely due to degenerative joint disease    * Hospital Course: The patient presented to the hospital with a complaint of chest pain. Please refer hospital admission H&P for the details. Patient was found out to have non-STEMI based on cardiac biomarkers. Patient was started on heparin drip at the time of admission. Echocardiogram was done showed ejection fraction 60-65 percentage. Due to non-STEMI, patient underwent cardiac cath which showed small branch vessel disease and patent stents. They advised to continue Plavix, statin and aspirin. Due to elevated blood pressure most likely secondary to pain, home dose of losartan was increased to twice a day. Her Lipitor dose was changed to 80 mg a day. Patient was continued on home medication for the comorbidities. Patient was chest pain-free after the cardiac cath. Patient was ambulating in the room without any new symptoms.   Patient will be discharged home on the following medications today. * Procedures:   Procedure(s):  Coronary Angiography  Fractional Flow Reserve      Consults: Cardiology    Significant Diagnostic Studies: Normal chest x-ray, echocardiogram and cardiac cath    Discharge Exam:   Please see per my progress note from April 16, 2020 for further detail    * Discharge Condition: improved  * Disposition: Home    Discharge Medications:  Current Discharge Medication List      START taking these medications    Details   diclofenac (FLECTOR) 1.3 % pt12 1 Patch by TransDERmal route every twelve (12) hours every twelve (12) hours. Indications: Right shoulder  Qty: 30 Patch, Refills: 0         CONTINUE these medications which have CHANGED    Details   atorvastatin (LIPITOR) 80 mg tablet Take 1 Tab by mouth nightly. Qty: 30 Tab, Refills: 0      losartan (COZAAR) 50 mg tablet Take 1 Tab by mouth two (2) times a day. Qty: 60 Tab, Refills: 0      clopidogreL (PLAVIX) 75 mg tab Take 1 Tab by mouth daily. Qty: 30 Tab, Refills: 0         CONTINUE these medications which have NOT CHANGED    Details   acetaminophen (TYLENOL) 500 mg tablet Take 1,000 mg by mouth every eight (8) hours as needed for Pain. amLODIPine (NORVASC) 10 mg tablet Take 10 mg by mouth daily. hydroxychloroquine (PLAQUENIL) 200 mg tablet Take 200 mg by mouth two (2) times a day. oxyCODONE IR (ROXICODONE) 5 mg immediate release tablet Take 5 mg by mouth every four (4) hours as needed for Pain. venlafaxine-ER 24 HR (EFFEXOR-ER) 37.5 mg tr24 tablet Take 37.5 mg by mouth daily. nitroglycerin (NITROSTAT) 0.4 mg SL tablet PLACE 1 TABLET UNDER THE TONGUE EVERY 5 MINUTES AS NEEDED FOR CHEST PAIN FOR UP TO 3 DOSES  Qty: 25 Tab, Refills: 0      promethazine (PHENERGAN) 25 mg tablet Take 1 Tab by mouth every six (6) hours as needed for Nausea. Qty: 12 Tab, Refills: 0      cyclobenzaprine (FLEXERIL) 10 mg tablet Take  by mouth three (3) times daily as needed for Muscle Spasm(s). omeprazole (PRILOSEC) 40 mg capsule Take 40 mg by mouth daily. diphenhydrAMINE (BENADRYL) 25 mg capsule Take 25 mg by mouth every six (6) hours as needed for Itching. ALPRAZolam (XANAX) 0.5 mg tablet Take 0.5 mg by mouth three (3) times daily as needed for Anxiety. sitaGLIPtin (JANUVIA) 100 mg tablet Take 100 mg by mouth daily. metoprolol succinate (TOPROL-XL) 25 mg XL tablet Take 1 Tab by mouth daily. Qty: 30 Tab, Refills: 1      aspirin delayed-release 81 mg tablet Take 1 Tab by mouth daily. Qty: 30 Tab, Refills: 11      gabapentin (NEURONTIN) 600 mg tablet Take 600 mg by mouth three (3) times daily. STOP taking these medications       HYDROcodone-acetaminophen (NORCO) 7.5-325 mg per tablet Comments:   Reason for Stopping:         oxyCODONE-acetaminophen (PERCOCET) 5-325 mg per tablet Comments:   Reason for Stopping:               * Follow-up Care/Patient Instructions: Activity: Activity as tolerated  Diet: Cardiac Diet and Diabetic Diet  Wound Care: None needed    Follow-up Information     Follow up With Specialties Details Why Contact Info    Curt Brice MD Internal Medicine   [address] [facility] [address]  [phone]          Follow-up Appointments   Procedures    FOLLOW UP VISIT Appointment in: Other ([address]) 1. With primary care physician in 5 days 2. With Dr. Bessie Chávez, cardiology, in 2 weeks     1. With primary care physician in 5 days  2. With Dr. Bessie Chávez, cardiology, in 2 weeks     Standing Status:   Standing     Number of Occurrences:   1     Order Specific Question:   Appointment in     Answer: Other (Specify)     Disclaimer: Sections of this note are dictated using utilizing voice recognition software, which may have resulted in some phonetic based errors in grammar and contents. Even though attempts were made to correct all the mistakes, some may have been missed, and remained in the body of the document.  If questions arise, please contact our department.     Signed:  Toño Coleman MD  4/16/2020  12:08 PM

## 2020-04-16 NOTE — PROGRESS NOTES
Problem: Falls - Risk of  Goal: *Absence of Falls  Description: Document Tito Arenas Fall Risk and appropriate interventions in the flowsheet. Outcome: Progressing Towards Goal  Note: Fall Risk Interventions:            Medication Interventions: Patient to call before getting OOB, Teach patient to arise slowly                   Problem: Patient Education: Go to Patient Education Activity  Goal: Patient/Family Education  Outcome: Progressing Towards Goal     Problem:  Moderate Sedation (Adult)  Goal: *Patent airway  Outcome: Progressing Towards Goal  Goal: *Adequate oxygenation  Outcome: Progressing Towards Goal  Goal: *Absence of aspiration  Outcome: Progressing Towards Goal  Goal: *Hemodynamically stable  Outcome: Progressing Towards Goal  Goal: *Optimal pain control at patient's stated goal  Outcome: Progressing Towards Goal  Goal: *Absence of nausea/vomiting  Outcome: Progressing Towards Goal  Goal: *Anxiety reduced or absent  Outcome: Progressing Towards Goal  Goal: *Absence of injury  Outcome: Progressing Towards Goal  Goal: *Level of consciousness returns to baseline  Outcome: Progressing Towards Goal  Goal: Interventions  Outcome: Progressing Towards Goal     Problem: Patient Education: Go to Patient Education Activity  Goal: Patient/Family Education  Outcome: Progressing Towards Goal

## 2020-04-16 NOTE — PROGRESS NOTES
SUBJECTIVE:    Patient is feeling better. She wants to go home. She has off-and-on right shoulder pain. No nausea or vomiting. No chest pain or abdominal pain. No headaches or dizziness. OBJECTIVE:    BP (!) 150/93   Pulse 77   Temp 98.5 °F (36.9 °C)   Resp 16   Ht 5' 6\" (1.676 m)   Wt 76.2 kg (168 lb)   SpO2 96%   Breastfeeding No   BMI 27.12 kg/m²     General appearance - alert, well appearing, and in no distress  Chest -clear air entry noted in bases, no wheezes  Heart - S1 and S2 normal  Abdomen - soft, nontender, nondistended, Bowel sounds present  Neurological - alert, oriented, normal speech, no focal findings noted, limited range of motion of right shoulder. Extremities - no pedal edema noted    ASSESSMENT:    1. Chest pain, resolved  2. Non-STEMI status post treatment  3. Coronary artery disease with previous stenting in 2018  4. Hypertension  5. Dyslipidemia  6. GERD  7. Rheumatoid arthritis  8. Depression  9. Chronic neuropathy  10. Chronic pain syndrome  11. Right shoulder pain most likely due to degenerative joint disease    PLAN:    Continue current management  Cardiology input noted about cardiac cath showing patent stents with small branch vessel disease and advised to continue medical management. Echocardiogram report reviewed  Discharge patient home today    Disclaimer: Sections of this note are dictated using utilizing voice recognition software, which may have resulted in some phonetic based errors in grammar and contents. Even though attempts were made to correct all the mistakes, some may have been missed, and remained in the body of the document. If questions arise, please contact our department.     BMP:   No results found for: NA, K, CL, CO2, AGAP, GLU, BUN, CREA, GFRAA, GFRNA  CBC:   Lab Results   Component Value Date/Time    WBC 7.2 04/16/2020 05:44 AM    HGB 16.4 (H) 04/16/2020 05:44 AM    HCT 47.0 (H) 04/16/2020 05:44 AM     04/16/2020 05:44 AM

## 2020-04-16 NOTE — PROGRESS NOTES
Reason for Admission:  NSTEMI (non-ST elevated myocardial infarction) ([facility] [address] [I21.4]  Chest pain [R07.9]  NSTEMI (non-ST elevated myocardial infarction) ([facility] [address] [I21.4]  Hypokalemia [E87.6]  CAD (coronary artery disease) [I25.10]  NSTEMI (non-ST elevated myocardial infarction) ([facility] [address] [I21.4]                 RRAT Score:    17%            Plan for utilizing home health:    TBD, pt has used [facility] in the past and agreeable to them again if ordered. Likelihood of Readmission:   LOW                         Transition of Care Plan:              Initial assessment completed with patient. Cognitive status of patient: oriented to time, place, person and situation. Face sheet information confirmed:  yes. The patient designates  Radha Kohler [phone] to participate in her discharge plan and to receive any needed information. This patient lives in a single family home with . Patient is able to navigate steps as needed. Prior to hospitalization, patient was considered to be independent with ADLs/IADLS : yes . Patient has a current ACP document on file: no  The  will be available to transport patient home upon discharge. The patient already has [employer] equipment available in the home. Patient is not currently active with home health. Patient has not stayed in a skilled nursing facility or rehab. This patient is on dialysis :no    List of available Home Health agencies were provided and reviewed with the patient prior to discharge. Freedom of choice signed: yes, for [facility]. Currently, the discharge plan is Home. The patient states that she can obtain her medications from the pharmacy, and take her medications as directed. Patient's current insurance is [facility]       Care Management Interventions  PCP Verified by CM:  Yes  Mode of Transport at Discharge: Self  Transition of Care Consult (CM Consult): Discharge Planning  Physical Therapy Consult: No  Occupational Therapy Consult: No  Current Support Network: Lives with Spouse  Confirm Follow Up Transport: Family(Spouse Pawan Mirza can take pt home upon d/c)  The Patient and/or Patient Representative was Provided with a Choice of Provider and Agrees with the Discharge Plan?: Yes  Freedom of Choice List was Provided with Basic Dialogue that Supports the Patient's Individualized Plan of Care/Goals, Treatment Preferences and Shares the Quality Data Associated with the Providers?: Yes  Discharge Location  Discharge Placement: Home with family assistance(Pt has used [facility] in the past and is agreeable to use again if needed)        [address]  [phone]

## 2020-04-16 NOTE — DISCHARGE INSTRUCTIONS
Patient Education   Patient Education   Patient Education        Reducing Risk of Another Heart Attack With Medicine: Care Instructions  Your Care Instructions    After a heart attack, medicines help lower your risk of having another one. These medicines include:  · ACE inhibitors or ARBs. These are types of blood pressure medicines. · Statins and other cholesterol medicines. These lower cholesterol. · Aspirin and other antiplatelets. These medicines prevent blood clots from forming in your blood vessels. This can help prevent a heart attack. · Beta-blocker medicines. These are a type of blood pressure and heart medicine. All medicines can cause side effects. So it is important to understand the pros and cons of any medicine you take. It is also important to take your medicines exactly as your doctor tells you to. Follow-up care is a key part of your treatment and safety. Be sure to make and go to all appointments, and call your doctor if you are having problems. It's also a good idea to know your test results and keep a list of the medicines you take. ACE inhibitors  ACE (angiotensin-converting enzyme) inhibitors are used for three main reasons. They lower blood pressure, protect the kidneys, and prevent heart attacks and strokes. Examples include benazepril (Lotensin), lisinopril (Prinivil), and ramipril (Altace). An angiotensin II receptor blocker (ARB) may be used instead of an ACE inhibitor. ARBs help you in the same ways as ACE inhibitors. Examples include candesartan (Atacand), irbesartan (Avapro), and losartan (Cozaar). Before you start taking an ACE inhibitor or an ARB, make sure your doctor knows if:  · You are taking a water pill (diuretic). · You are taking potassium pills or using salt substitutes. · You are pregnant or breastfeeding. · You have had a kidney transplant or other kidney problems. ACE inhibitors and ARBs can cause side effects.  Call your doctor right away if you have:  · Trouble breathing. · Swelling in your face, head, neck, or tongue. Statins  Statins can help lower your risk for a heart attack and stroke. This medicine lowers your cholesterol. Examples include atorvastatin (Lipitor), lovastatin (Mevacor), pravastatin (Pravachol), and simvastatin (Zocor). Before you start taking a statin, make sure your doctor knows if:  · You have had a kidney transplant or other kidney problems. · You have liver disease. · You take any other prescription medicine, over-the-counter medicine, vitamins, supplements, or herbal remedies. · You are pregnant or breastfeeding. Statins can cause side effects. Call your doctor right away if you have:  · New, severe muscle aches. · Brown urine. Aspirin  After a heart attack, aspirin can help lower your risk of having another one. Most heart attacks are caused by a blood clot that blocks a coronary artery. When this happens, oxygen can't get to the heart muscle, and part of the heart dies. Aspirin can help prevent blood clots that can block the blood vessels. You may not be able to use aspirin if you:  · Have asthma or certain other health conditions. · Have an ulcer or other stomach problem. · Take some other medicine (called a blood thinner) that prevents blood clots. · Are allergic to aspirin. Your doctor may recommend that you take one low-dose aspirin (81 mg) tablet each day, with a meal and a full glass of water. Aspirin can also cause serious bleeding. Be sure you get instructions about how to take aspirin safely. Call your doctor right away if you have:  · Unusual bleeding or bruising. · Nausea, vomiting, or heartburn. · Black or bloody stools. Beta-blockers  Beta-blockers are used for three main reasons. They lower blood pressure, relieve angina symptoms (such as chest pain or pressure), and reduce the chances of a second heart attack.  They include atenolol (Tenormin), carvedilol (Coreg), and metoprolol (Lopressor). Before you start taking a beta-blocker, make sure your doctor knows if you have:  · Severe asthma or frequent asthma attacks. · A very slow pulse (less than 55 beats a minute). Beta-blockers can cause side effects. Call your doctor right away if you have:  · Wheezing or trouble breathing. · Dizziness or lightheadedness. · Asthma that gets worse. When should you call for help? Watch closely for changes in your health, and be sure to contact your doctor if you have any problems. Where can you learn more? Go to http://kelly-jennie.info/  Enter R428 in the search box to learn more about \"Reducing Risk of Another Heart Attack With Medicine: Care Instructions. \"  Current as of: December 15, 2019Content Version: 12.4  © 7256-9208 [employer]. Care instructions adapted under license by [employer] (which disclaims liability or warranty for this information). If you have questions about a medical condition or this instruction, always ask your healthcare professional. [address] any warranty or liability for your use of this information. Coronary Artery Disease: Care Instructions  Your Care Instructions    The heart is a muscle, and like any muscle, it needs blood to work well. Coronary artery disease occurs when the arteries that bring oxygen-rich blood to your heart have a buildup of plaque--deposits of fats and other substances. Plaque can reduce blood flow to the heart muscle. This can cause angina symptoms such as chest pain or pressure. A heart attack can happen if blood flow is completely blocked. You can do a lot to improve your health and prevent a heart attack. Eating healthy food, not smoking, getting regular exercise, and taking your medicine are the main things you can do every day to stay healthy. Follow-up care is a key part of your treatment and safety.  Be sure to make and go to all appointments, and call your doctor if you are having problems. It's also a good idea to know your test results and keep a list of the medicines you take. How can you care for yourself at home? Medicines    · Be safe with medicines. Take your medicines exactly as prescribed. Call your doctor if you think you are having a problem with your medicine. You will get more details on the specific medicines your doctor prescribes. You may need several medicines. ? Angiotensin-converting enzyme (ACE) inhibitors, angiotensin II receptor blockers (ARBs), beta-blockers, and statins can help prevent a heart attack. ACE inhibitors, ARBs, and beta-blockers help lower your blood pressure. Statins help lower cholesterol, which is a type of fat that can clog your arteries. ? Nitrates can help make chest pain happen less often. ? Aspirin and other blood thinners help prevent heart attacks and strokes.     · If your doctor has given you nitroglycerin for angina symptoms (such as chest pain or pressure) keep it with you at all times. If you have symptoms, sit down and rest, and take the first dose of nitroglycerin as directed. If your symptoms get worse or are not getting better within 5 minutes, call  911 right away. Stay on the phone. The emergency  will give you further instructions.     · Be sure to tell your doctor about any angina symptoms that you have had, even if they went away.     · Do not take any over-the-counter medicines, vitamins, or herbal products without talking to your doctor first.   Liz Ospina your doctor if a cardiac rehab program is right for you. Cardiac rehab can help you make lifestyle changes. In cardiac rehab, a team of health professionals provides education and support to help you make new, healthy habits.    · Do not smoke. Avoid secondhand smoke too. Smoking can increase your risk of a heart attack or stroke. If you need help quitting, talk to your doctor about stop-smoking programs and medicines.  These can increase your chances of quitting for good.     · Eat a heart-healthy diet that is high in fiber and low in saturated fat and sodium. ? Learn what a serving is. A \"serving\" and a \"portion\" are not always the same thing. Make sure that you are not eating larger portions than recommended. For example, a serving of pasta is ½ cup. A serving size of meat is 2 to 3 ounces; a 3-ounce serving is about the size of a deck of cards. ? Eat a variety of grain products every day. Include whole-grain foods such as oats, whole wheat bread, and brown rice. ? Eat fish, skinless poultry, lean meats, and soy products such as tofu instead of high-fat meats. Cut out all visible fat when you prepare meat. ? Eat a variety of fruit and vegetables every day. They have lots of nutrients that help protect against heart disease, and they have little--if any--fat. Keep carrots, celery, and other veggies handy for snacks. Buy fruit that is in season and store it where you can see it so that you will be tempted to eat it. Cook dishes that have a lot of veggies in them, such as stir-fried dishes and soups. ? Read food labels and try to avoid saturated fat and trans fat. They increase your risk of heart disease. Bake, broil, grill, or steam foods instead of frying them. Use olive or canola oil when you cook. Try cholesterol-lowering spreads, such as Benecol or Take Control. ? Limit sodium. Your doctor may recommend that you limit sodium to less than 1,500 mg a day. ? Limit processed foods, including cookies and crackers. ? Limit drinks and foods with added sugar. ? Choose low-fat or fat-free milk and dairy products. ? Limit alcohol to 2 drinks a day for men and 1 drink a day for women. Too much alcohol can cause health problems.     · If your doctor recommends it, get more exercise. Walking is a good choice. Bit by bit, increase the amount you walk every day. Try for at least 30 minutes on most days of the week.  You also may want to swim, bike, or do other activities.     · Stay at a healthy weight. Lose weight if you need to.     · Talk to your family, friends, or a therapist about your feelings. It is normal to feel upset about having this disease and to feel afraid of having a heart attack. Talking openly about your feelings can help you cope. If you think you have symptoms of depression, talk to your doctor.     · Avoid colds and flu. Get a pneumococcal vaccine shot. If you have had one before, ask your doctor whether you need another dose. Get a flu vaccine every year. If you must be around people with colds or flu, wash your hands often. When should you call for help? Call 911 anytime you think you may need emergency care. For example, call if:    · You have symptoms of a heart attack. These may include:  ? Chest pain or pressure, or a strange feeling in the chest.  ? Sweating. ? Shortness of breath. ? Nausea or vomiting. ? Pain, pressure, or a strange feeling in the back, neck, jaw, or upper belly or in one or both shoulders or arms. ? Lightheadedness or sudden weakness. ? A fast or irregular heartbeat. After you call  911, the  may tell you to chew 1 adult-strength or 2 to 4 low-dose aspirin. Wait for an ambulance. Do not try to drive yourself.     · You have angina symptoms (such as chest pain or pressure) that do not go away with rest or are not getting better within 5 minutes after you take a dose of nitroglycerin.     · You passed out (lost consciousness).    Call your doctor now or seek immediate medical care if:    · You are having angina symptoms, such as chest pain or pressure, more often than usual, or they are different or worse than usual.     · You have new or increased shortness of breath.     · You are dizzy or lightheaded, or you feel like you may faint.    Watch closely for changes in your health, and be sure to contact your doctor if you have any problems. Where can you learn more?   Go to http://kelly-jennie.info/  Enter Z877 in the search box to learn more about \"Coronary Artery Disease: Care Instructions. \"  Current as of: December 15, 2019Content Version: 12.4  © 7984-5705 [employer]. Care instructions adapted under license by [employer] (which disclaims liability or warranty for this information). If you have questions about a medical condition or this instruction, always ask your healthcare professional. [address] any warranty or liability for your use of this information. Heart Attack: Care Instructions  Your Care Instructions    A heart attack (myocardial infarction, or MI) occurs when one or more of the coronary arteries, which supply the heart with oxygen-rich blood, is blocked. A blockage usually occurs when plaque inside the artery breaks open and a blood clot forms in the artery. After a heart attack, you may be worried about your future. Over the next several weeks, your heart will start to heal. Though it can be hard to break old habits, you can prevent another heart attack by making some lifestyle changes and by taking medicines. You may use this information for ideas about what to do at home to speed your recovery. Follow-up care is a key part of your treatment and safety. Be sure to make and go to all appointments, and call your doctor if you are having problems. It's also a good idea to know your test results and keep a list of the medicines you take. How can you care for yourself at home? Activity    · Until your doctor says it is okay, do not do strenuous exercise. And do not lift, pull, or push anything heavy. Ask your doctor what types of activities are safe for you.     · If your doctor has not set you up with a cardiac rehabilitation (rehab) program, talk to him or her about whether that is right for you. Cardiac rehab includes supervised exercise.  It also includes help with diet and lifestyle changes and emotional support. It may reduce your risk of future heart problems.     · Increase your activities slowly. Take short rest breaks when you get tired.     · If your doctor recommends it, get more exercise. Walking is a good choice. Bit by bit, increase the amount you walk every day. Try for at least 30 minutes on most days of the week. You also may want to swim, bike, or do other activities. Talk with your doctor before you start an exercise program to make sure it is safe for you.     · Ask your doctor when you can drive, go back to work, and do other daily activities again.     · You can have sex as soon as you feel ready for it. Often this means when you can easily walk around or climb stairs. Talk with your doctor if you have any concerns. If you are taking nitroglycerin, do not take erection-enhancing medicine such as sildenafil (Viagra), tadalafil (Cialis), or vardenafil (Levitra) .    Lifestyle changes    · Do not smoke. Smoking increases your risk of another heart attack. If you need help quitting, talk to your doctor about stop-smoking programs and medicines. These can increase your chances of quitting for good.     · Eat a heart-healthy diet that is low in saturated fat and salt, and is full of fruits, vegetables and whole-grains. You may get more details about how to eat healthy. But these tips can help you get started.     · Stay at a healthy weight, or lose weight if you need to. Medicines    · Be safe with medicines. Take your medicines exactly as prescribed. Call your doctor if you think you are having a problem with your medicine. You will get more details on the specific medicines your doctor prescribes. Do not stop taking your medicine unless your doctor tells you to. Not taking your medicine might raise your risk of having another heart attack.     · You may need several medicines to help lower your risk of another heart attack. These include:  ?  Blood pressure medicines such as angiotensin-converting enzyme (ACE) inhibitors, ARBs (angiotensin II receptor blockers), and beta-blockers. ? Cholesterol medicine called statins. ? Aspirin and other blood thinners. These prevent blood clots that can cause a heart attack.     · If your doctor has given you nitroglycerin, keep it with you at all times. If you have angina symptoms, such as chest pain or pressure, sit down and rest. Take the first dose of nitroglycerin as directed. If symptoms get worse or are not getting better within 5 minutes, call  911 right away. Stay on the phone. The emergency  will tell you what to do.     · Do not take any over-the-counter medicines, vitamins, or herbal products without talking to your doctor first.    Staying healthy    · Manage other health conditions such as high blood pressure and diabetes.     · Avoid colds and flu. Get a pneumococcal vaccine shot. If you have had one before, ask your doctor whether you need another dose. Get the flu vaccine every year. If you must be around people with colds or flu, wash your hands often.     · Be sure to tell your doctor about any angina symptoms you have had, even if they went away. Pay attention to your angina symptoms. Know what is typical for you and learn how to control it. Know when to call for help.     · Talk to your family, friends, or a counselor about your feelings. It is normal to feel frightened, angry, hopeless, helpless, and even guilty. Talking openly about bad feelings can help you cope. If you have symptoms of depression, talk to your doctor. When should you call for help? Call 911 anytime you think you may need emergency care. For example, call if:    · You have symptoms of a heart attack. These may include:  ? Chest pain or pressure, or a strange feeling in the chest.  ? Sweating. ? Shortness of breath. ? Nausea or vomiting.   ? Pain, pressure, or a strange feeling in the back, neck, jaw, or upper belly or in one or both shoulders or arms.  ? Lightheadedness or sudden weakness. ? A fast or irregular heartbeat. After you call  911, the  may tell you to chew 1 adult-strength or 2 to 4 low-dose aspirin. Wait for an ambulance. Do not try to drive yourself.     · You have angina symptoms (such as chest pain or pressure) that do not go away with rest or are not getting better within 5 minutes after you take a dose of nitroglycerin.     · You passed out (lost consciousness).     · You feel like you are having another heart attack.    Call your doctor now or seek immediate medical care if:    · You are having angina symptoms, such as chest pain or pressure, more often than usual, or the symptoms are different or worse than usual.     · You have new or increased shortness of breath.     · You are dizzy or lightheaded, or you feel like you may faint.    Watch closely for changes in your health, and be sure to contact your doctor if you have any problems. Where can you learn more? Go to http://kelly-jennie.info/  Enter H564 in the search box to learn more about \"Heart Attack: Care Instructions. \"  Current as of: December 15, 2019Content Version: 12.4  © 0006-7117 [employer]. Care instructions adapted under license by [employer] (which disclaims liability or warranty for this information). If you have questions about a medical condition or this instruction, always ask your healthcare professional. [place] [address] any warranty or liability for your use of this information.

## 2020-04-16 NOTE — PROGRESS NOTES
[facility]      CARDIOLOGY PROGRESS NOTE  RECS:  Patient with NSTEMI likely from small branch vessel disease. Patent stents  Continue DAPT for 1-3 months and then aspirin 81mg daily. No hematoma. Can be discharged home on current meds      ASSESSMENT:  Hospital Problems  Date Reviewed: 9/26/2016          Codes Class Noted POA    Hypokalemia ICD-10-CM: E87.6  ICD-9-CM: 276.8  4/14/2020 Unknown        Chest pain ICD-10-CM: R07.9  ICD-9-CM: 786.50  4/14/2020 Unknown        CAD (coronary artery disease) ICD-10-CM: I25.10  ICD-9-CM: 414.00  4/14/2020 Unknown        NSTEMI (non-ST elevated myocardial infarction) [facility]) ICD-10-CM: I21.4  ICD-9-CM: 410.70  6/2/2015 Unknown                SUBJECTIVE:  No CP or SOB    OBJECTIVE:    VS:   Visit Vitals  /80   Pulse 61   Temp 98.8 °F (37.1 °C)   Resp 16   Ht 5' 6\" (1.676 m)   Wt 76.2 kg (168 lb)   SpO2 98%   Breastfeeding No   BMI 27.12 kg/m²         Intake/Output Summary (Last 24 hours) at 4/16/2020 1137  Last data filed at 4/16/2020 2204  Gross per 24 hour   Intake 1430.32 ml   Output 875 ml   Net 555.32 ml     TELE: normal sinus rhythm    General: in no apparent distress  HENT: Normocephalic, atraumatic. Normal external eye.   Neck :  JVD difficult to assess due to obesity  Cardiac:  regular rate and rhythm, S1, S2 normal, no murmur, click, rub or gallop  Lungs: clear to auscultation bilaterally  Abdomen: Soft, nontender, no masses  Extremities:  No c/c/e, peripheral pulses present      Labs: Results:       Chemistry Recent Labs     04/15/20  0215 04/14/20  1525   * 199*    141   K 3.9 3.1*    105   CO2 27 27   BUN 11 15   CREA 0.75 0.92   CA 8.8 8.7   AGAP 6 9   BUCR 15 16   AP  --  96   TP  --  7.6   ALB  --  3.9   GLOB  --  3.7   AGRAT  --  1.1      CBC w/Diff Recent Labs     04/16/20  0544 04/15/20  0215 04/14/20  1525   WBC 7.2 10.5 12.1   RBC 4.96 4.74 4.55   HGB 16.4* 14.9 14.7   HCT 47.0* 44.4 43.7    345 345   GRANS 68 67 69   LYMPH 21 26 22   EOS 3 2 2      Cardiac Enzymes Recent Labs     04/15/20  0215 04/14/20  2046    109   CKND1 6.7* 6.3*      Coagulation Recent Labs     04/16/20  0544 04/15/20  1130   APTT 28.5 30.7       Lipid Panel Lab Results   Component Value Date/Time    Cholesterol, total 352 (H) 04/15/2020 02:15 AM    HDL Cholesterol 61 (H) 04/15/2020 02:15 AM    LDL, calculated 249.8 (H) 04/15/2020 02:15 AM    VLDL, calculated 41.2 04/15/2020 02:15 AM    Triglyceride 206 (H) 04/15/2020 02:15 AM    CHOL/HDL Ratio 5.8 (H) 04/15/2020 02:15 AM      BNP No results for input(s): BNPP in the last 72 hours.    Liver Enzymes Recent Labs     04/14/20  1525   TP 7.6   ALB 3.9   AP 96   SGOT 10      Thyroid Studies Lab Results   Component Value Date/Time    TSH 3.99 (H) 04/15/2020 02:15 AM              Fernando Cavazos MD

## 2020-04-16 NOTE — PROGRESS NOTES
0700: Bedside shift change report given to [employer], RN (oncoming nurse) by Rafa Hooker RN (offgoing nurse). Report included the following information SBAR, Kardex and Cardiac Rhythm Sinus rhythm to Sinus Vena Po.      1530: Patient discharged and escorted off unit by wheelchair to [facility] center entrance

## 2020-04-16 NOTE — PROGRESS NOTES
Discharge order noted for today. Orders reviewed. No needs identified at this time.  remains available if needed. Pt to be transported home by her .     Kierra Christensen RN BSN  Care Manager  [phone]

## 2020-04-16 NOTE — INTERDISCIPLINARY ROUNDS
Interdisciplinary Round Note   Patient Information: Patient Information: Christiano Pérez                                      3940/78   Reason for Admission: NSTEMI (non-ST elevated myocardial infarction) [facility]) [I21.4]  Chest pain [R07.9]  [facility]STEM[facility] (non-ST elevated [facility]) [facility] [I21.4]  Hypokalemia [E87.6]  CAD (coronary artery disease) [I25.10]  NSTEMI (non-ST elevated myocardial infarction) ([facility] [I21.4]   Attending Provider:   Tatiana Tavarez MD   Past Medical History:   Past Medical History:   Diagnosis Date    Arthritis     CAD (coronary artery disease) 2015    Diabetes ([facility] 2015    Dyspnea on exertion     Essential hypertension, benign     GERD (gastroesophageal reflux disease)     H/O emotional problems     History of fall 07/01/14    Twisted right knee    Hypertension 1995    Osteoarthritis of right knee     Osteoarthritis of right knee 9/22/2016    Other and unspecified hyperlipidemia     Pre-operative cardiovascular examination     Rheumatoid arthritis [facility])       Hospital day: 2  Estimated discharge date: 4/16  RRAT Score: Low Risk            12       Total Score        3 Has Seen PCP in Last 6 Months (Yes=3, No=0)    5 Pt. Coverage (Medicare=5 , Medicaid, or Self-Pay=4)    4 Charlson Comorbidity Score (Age + Comorbid Conditions)        Criteria that do not apply:    . Living with Significant Other. Assisted Living. LTAC. SNF.  or   Rehab    Patient Length of Stay (>5 days = 3)    IP Visits Last 12 Months (1-3=4, 4=9, >4=11)       Goals for Today: discharge           VITAL SIGNS  Vitals:    04/16/20 0800 04/16/20 0928 04/16/20 1029 04/16/20 1050   BP: 150/80 184/88 (!) 185/94 143/80   Pulse: 68 79 61    Resp: 16      Temp: 98.8 °F (37.1 °C)      SpO2: 98%      Weight:       Height:              Lines, Drains, & Airways    [REMOVED] Peripheral IV 04/14/20 Right Antecubital-Site Assessment: Clean, dry, & intact  Peripheral IV 04/15/20 Left Antecubital-Site Assessment: Clean, dry, & intact  Peripheral IV 04/15/20 Left Hand-Site Assessment: Clean, dry, & intact       VTE Prophylaxis                                   Intake and Output:   04/14 1901 - 04/16 0700  In: 930.3 [P.O.:480; I.V.:450.3]  Out: 875 [Urine:875]  04/16 0701 - 04/16 1900  In: 500 [P.O.:500]  Out: -  Current Diet: DIET CARDIAC Regular       Abdominal   Last Bowel Movement Date: 04/15/20  Stool Appearance: Loose     Recent Glucose Results:   Lab Results   Component Value Date/Time    GLUCPOC 169 (H) 04/15/2020 05:20 PM          IV Antibiotics? no         Activity Level: Activity Level:  Up with Assistance  Needs assistance with ADLs: no  PT Consult Status: NO Current Immunizations:  Immunization History   Administered Date(s) Administered    Influenza Vaccine (Quad) PF 09/27/2016      Recommendations:   Discharge Disposition: Home    Medication Reconciliation Completed: NO    Cardiac/Pulmonary Rehab Ordered:  NO    Needs for Discharge: none Recommendations from IDR team: none       [address]  [phone]

## 2020-04-17 NOTE — PROGRESS NOTES
Called Patient for discharge follow up. Patient verbalized understanding of discharge instructions. Patient has filled discharge prescriptions and has started taking as prescribed. Patient is aware of all follow up visits and verbalized understanding of place and times. No specific questions, at this time. Patient did complain about the long documentation about Opioid addiction on her discharge instructions. Patient complained that she was not on opioids and was offended that this was on her discharge instructions. Also complained that the nurse did not get a list of her home medications at admission. Will follow up with CVT .       Urbano Bean RN BSN  Care Manager  [phone]

## 2020-05-05 ENCOUNTER — HOSPITAL ENCOUNTER (OUTPATIENT)
Dept: PHYSICAL THERAPY | Age: 60
Discharge: HOME | End: 2020-05-05
Payer: MEDICARE

## 2020-05-05 PROCEDURE — 97110 THERAPEUTIC EXERCISES: CPT

## 2020-05-05 PROCEDURE — 97161 PT EVAL LOW COMPLEX 20 MIN: CPT

## 2020-05-05 PROCEDURE — 97535 SELF CARE MNGMENT TRAINING: CPT

## 2020-05-05 NOTE — PROGRESS NOTES
PT DAILY TREATMENT NOTE 10-18    Patient Name: Cesario Clancy  Date:2020  : 1960  [x]  Patient  Verified  Payor: Ken Ruiz / Plan: [employer] AARP MEDICARE COMPLETE / Product Type: Managed Care Medicare /    In time:11:00  Out time:11:36  Total Treatment Time (min): 36  Visit #: 1 of [address] [place] [address] Dr. Guero Camarillo was informed of the inherent limitations of a virtual visit,  and has consented to a virtual therapy visit on 2020. Information regarding emergency contact information for this patient during this visit is to contact:  Sara Davila () [phone] in addition to calling 911. The patient was informed that at any time during the virtual visit, they can decide to stop the virtual visit. The patient verified that they are physically located in the [facility] for this virtual visit.       Medicare/BCBS Only   Total Timed Codes (min):  23 1:1 Treatment Time:  36       Treatment Area: Pain in right shoulder [M25.511]    SUBJECTIVE  Pain Level (0-10 scale): 7  Any medication changes, allergies to medications, adverse drug reactions, diagnosis change, or new procedure performed?: [x] No    [] Yes (see summary sheet for update)  Subjective functional status/changes:   [] No changes reported  Pt reports right shoulder pain since February when playing fetch with cat    OBJECTIVE    13 min [x]Eval                  []Re-Eval       10 min Therapeutic Exercise:  [] See flow sheet :   Rationale: increase ROM and increase strength to improve the patients ability to improve ease of ADL performance and grooming    13 min Self- Care:  []  See flow sheet :   Rationale: self care and pt education  to improve the patients ability to self manage symptoms and reduce further pain with ADLs by understanding nature of condition             With   [] TE   [] TA   [] neuro   [] other: Patient Education: [x] Review HEP    [] Progressed/Changed HEP based on:   [] positioning   [] body mechanics [] transfers   [] heat/ice application    [] other:      Access Code: [email or web address]   URL: [email or web address]   Date: 05/05/2020   Prepared by: Kun Mishra     Exercises  Supine Shoulder Flexion with Dowel - 10 reps - 1 sets - 2x daily - 4x weekly  Shoulder Scaption AAROM with Dowel - 10 reps - 1 sets - 2x daily                            - 4x weekly  Seated Shoulder Flexion Towel Slide at Table Top - 10 reps - 1 sets - 3 hold - 2x daily - 4x weekly  Seated Shoulder Scaption Slide at Table Top with Forearm in Neutral - 10 reps - 1 sets - 3 hold - 2x daily - 4x weekly  Isometric Shoulder Flexion at Wall - 5 reps - 1 sets - 5 hold - 2x daily - 4x weekly  Isometric Shoulder Abduction at Wall - 5 reps - 1 sets - 5 hold - 2x daily - 4x weekly  Isometric Shoulder External Rotation at Wall - 5 reps - 1 sets - 5 hold - 2x daily - 4x weekly  Sidelying Shoulder Abduction Palm Forward - 5-10 reps - 1 sets - 1-2x daily - 4x weekly  Sidelying Shoulder External Rotation - 5-10 reps - 1 sets - 1-2x daily - 4x weekly    Other Objective/Functional Measures:      Pain Level (0-10 scale) post treatment: 7    ASSESSMENT/Changes in Function: jaky Cifuentes is a 64 y.o. female being evaluated by a Virtual Visit (video visit) encounter to address concerns as mentioned above. A caregiver was present when appropriate. Due to this being a TeleHealth encounter (During TZAPY-01 public health emergency), evaluation of the following areas was limited: Direct tissue palpation, direct goniometric measurements, blood pressure, O2 saturation. Pursuant to the emergency declaration under the [facility], [address] [place] [address] authority and the [employer] and [employer] Act, this Virtual Visit was conducted with patient's (and/or legal guardian's) consent, to reduce the risk of exposure to COVID-19 and provide necessary medical care.       Services were provided through a video synchronous discussion virtually to substitute for in-person encounter. --Silvio Officer on 5/5/2020 at 12:55 PM    An electronic signature was used to authenticate this note. Patient will continue to benefit from skilled PT services to modify and progress therapeutic interventions, address functional mobility deficits, address ROM deficits, address strength deficits, analyze and address soft tissue restrictions, analyze and cue movement patterns and analyze and modify body mechanics/ergonomics to attain remaining goals. [x]  See Plan of Care  []  See progress note/recertification  []  See Discharge Summary         Progress towards goals / Updated goals:  Short Term Goals: To be accomplished in 2 weeks:  1. Pt will demonstrate I and compliance with HEP to promote self management of symptoms. IE: HEP issued  2. Pt will demonstrate right shoulder AAROM elevation to 130 deg without pain increase to improve joint mobility and stretch tolerance. IE: pain at 100 deg elevation  Long Term Goals: To be accomplished in 4 weeks:  1. Pt will demonstrate right shoulder flexion and abduction AROM to 130 deg without pain for improved ease of OH activity. IE: pain at 100 deg elevation and 85 deg abduction  2. Pt will demonstrate right shoulder SHEKHAR to C2 without increased pain to improve self care and grooming ease. IE: SHEKHAR to vertex  3. Pt will report ability to lift coffee pot without increase in pain to improve RTC strength and functional independence.    IE: unable to lift coffee pot due to pain    PLAN  []  Upgrade activities as tolerated     [x]  Continue plan of care  []  Update interventions per flow sheet       []  Discharge due to:_  []  Other:_      Silvio Officer MOIZ MACK 5/5/2020  11:08 AM    Future Appointments   Date Time Provider Rosemary Gardner   5/18/2020 10:45 AM Serge Olivier MD [address] [place] [address]

## 2020-05-05 NOTE — PROGRESS NOTES
[facility]  [address]  [place], [address]  [phone] fax    Plan of Care/ Statement of Necessity for Physical Therapy Services    Patient name: Mandeep Garcia Start of Care: 2020   Referral source: Fernanda Pacheco MD : 1960    Medical Diagnosis: Pain in right shoulder [M25.511]  Payor: [address] [place] [address] / Plan: [address] / Product Type: Managed Care Medicare /  Onset Date:    Treatment Diagnosis: Right shoulder pain   Prior Hospitalization: see medical history Provider#: [id number]   Medications: Verified on Patient summary List    Comorbidities: MI x 3, heart dz, fibromyalgia, depression, RA, osteoporosis, Diabetes, HTN, visual impaired, tobacco use   Prior Level of Function: Pt RHD female with improved ease of ADL performance prior to shoulder pain      The Plan of Care and following information is based on the information from the initial evaluation. Assessment/ key information: Pt is a 65 y/o F presenting with c/o right shoulder pain since February this year. She reports playing fetch with her cat when she began to experience shoulder pain. She reports her pain did become quite elevated and she believes it may have contributed to her most recent MI 3 weeks ago. Pt reports pain with elevation greater than 100 deg and pain with abduction to 90 deg. Functional ER also limited secondary to pain. Pt does demonstrate some ability to elevate her arm further with assistance from left UE, still pain provoked. Pt reports that she has had an X-ray that appeared unremarkable overall but has not been able to complete MRI due to COVID-19. Pt signs and symptoms appear consistent with mechanical shoulder pain and likely RTC pathology. She would benefit from continued PT via telemedicine platform to improve ROM, pain and strength to improve ease of ADL performance and self care.     Evaluation Complexity History HIGH Complexity :3+ comorbidities / personal factors will impact the outcome/ POC ; Examination LOW Complexity : 1-2 Standardized tests and measures addressing body structure, function, activity limitation and / or participation in recreation  ;Presentation LOW Complexity : Stable, uncomplicated  ;Clinical Decision Making Other outcome measures clinical judgement  LOW   Overall Complexity Rating: LOW   Problem List: pain affecting function, decrease ROM, decrease strength, decrease ADL/ functional abilitiies, decrease activity tolerance and decrease flexibility/ joint mobility   Treatment Plan may include any combination of the following: Therapeutic exercise, Therapeutic activities, Neuromuscular re-education, Manual therapy, Patient education, Self Care training and Functional mobility training  Patient / Family readiness to learn indicated by: trying to perform skills and interest  Persons(s) to be included in education: patient (P)  Barriers to Learning/Limitations: yes;  other pain  Patient Goal (s): Pain free or at least ease some of the pain  Patient Self Reported Health Status: fair  Rehabilitation Potential: good    Short Term Goals: To be accomplished in 2 weeks:  1. Pt will demonstrate I and compliance with HEP to promote self management of symptoms. 2. Pt will demonstrate right shoulder AAROM elevation to 130 deg without pain increase to improve joint mobility and stretch tolerance. Long Term Goals: To be accomplished in 4 weeks:  1. Pt will demonstrate right shoulder flexion and abduction AROM to 130 deg without pain for improved ease of OH activity. 2. Pt will demonstrate right shoulder SHEKHAR to C2 without increased pain to improve self care and grooming ease. 3. Pt will report ability to lift coffee pot without increase in pain to improve RTC strength and functional independence. Frequency / Duration: Patient to be seen 2 times per week for 4 weeks.     Patient/ Caregiver education and instruction: Diagnosis, prognosis, self care, activity modification and exercises   [x]  Plan of care has been reviewed with PTA    Certification Period: 5/5/2020 to 6/2/2020  MOIZ Williamson DPT 5/5/2020 2:18 PM    ________________________________________________________________________    I certify that the above Therapy Services are being furnished while the patient is under my care. I agree with the treatment plan and certify that this therapy is necessary.     [de-identified] Signature:____________________  Date:____________Time: _________    Please sign and return to [facility] [address] [place] [address], [facility] [address]  [phone] fax

## 2020-05-12 ENCOUNTER — HOSPITAL ENCOUNTER (OUTPATIENT)
Dept: PHYSICAL THERAPY | Age: 60
Discharge: HOME | End: 2020-05-12
Payer: MEDICARE

## 2020-05-12 PROCEDURE — 97110 THERAPEUTIC EXERCISES: CPT

## 2020-05-12 NOTE — PROGRESS NOTES
PT DAILY TREATMENT NOTE 10-18    Patient Name: Nick Wing  Date:2020  : 1960  [x]  Patient  Verified  Payor: Avril Daigle / Plan: [employer] AARP MEDICARE COMPLETE / Product Type: Managed Care Medicare /    In time:9:38  Out time:9:58  Total Treatment Time (min): 20  Visit #: 2 of 1 [facility] Dr. Mikel Mann was informed of the inherent limitations of a virtual visit,  and has consented to a virtual therapy visit on 2020. Information regarding emergency contact information for this patient during this visit is to contact:  Paola Peace () [phone]  in addition to calling 911. The patient was informed that at any time during the virtual visit, they can decide to stop the virtual visit. The patient verified that they are physically located in the [facility] for this virtual visit. Medicare/BCBS Only   Total Timed Codes (min):  20 1:1 Treatment Time:  20       Treatment Area: Pain in right shoulder [M25.511]    SUBJECTIVE  Pain Level (0-10 scale): 9  Any medication changes, allergies to medications, adverse drug reactions, diagnosis change, or new procedure performed?: [x] No    [] Yes (see summary sheet for update)  Subjective functional status/changes:   [] No changes reported  The patient reports that she has been in pain still and feels a little worse.  She reports that her rheumatologist is scheduling her an MRI in     OBJECTIVE    20 min Therapeutic Exercise:  [] See flow sheet :   Rationale: increase ROM and increase strength to improve the patients ability to perform daily tasks and self care       With   [] TE   [] TA   [] neuro   [] other: Patient Education: [x] Review HEP    [] Progressed/Changed HEP based on:   [] positioning   [] body mechanics   [] transfers   [] heat/ice application    [] other:      Other Objective/Functional Measures:      Pain Level (0-10 scale) post treatment: 10    ASSESSMENT/Changes in Function: Pt with elevated pain levels today and unable to tolerate more than 3 gentle exercises due to pain increase. Pt reports that she is utilizing lidocaine patches with short term relief. She is scheduled to see her MD in June who is planning to have MRI completed per pt reported. Advised we will try a few more visits and possibly one in person session to assess response to PROM. If pt still quite limited by pain will likely hold PT until MRI/ MD follow up completed. Rios Anton is a 64 y.o. female being evaluated by a Virtual Visit (video visit) encounter to address concerns as mentioned above. A caregiver was present when appropriate. Due to this being a TeleHealth encounter (During MYJFZ-67 public health emergency), evaluation of the following areas was limited: Direct tissue palpation, direct goniometric measurements, blood pressure, O2 saturation. Pursuant to the emergency declaration under the [address] [place] [address], [address] [place] [address] authority and the [employer] and [employer] Act, this Virtual Visit was conducted with patient's (and/or legal guardian's) consent, to reduce the risk of exposure to COVID-19 and provide necessary medical care. Services were provided through a video synchronous discussion virtually to substitute for in-person encounter. --Khari Palacio DPT, CMTPT on 5/12/2020 at 10:09 AM    An electronic signature was used to authenticate this note. Patient will continue to benefit from skilled PT services to modify and progress therapeutic interventions, address functional mobility deficits, address ROM deficits, address strength deficits, analyze and address soft tissue restrictions, analyze and cue movement patterns and analyze and modify body mechanics/ergonomics to attain remaining goals.      []  See Plan of Care  []  See progress note/recertification  []  See Discharge Summary         Progress towards goals / Updated goals:  Short Term Goals: To be accomplished in 2 weeks:  1. Pt will demonstrate I and compliance with HEP to promote self management of symptoms. IE: HEP issued   Current: pt not initiated yet 5/12/2020  2. Pt will demonstrate right shoulder AAROM elevation to 130 deg without pain increase to improve joint mobility and stretch tolerance. IE: pain at 100 deg elevation  Long Term Goals: To be accomplished in 4 weeks:  1. Pt will demonstrate right shoulder flexion and abduction AROM to 130 deg without pain for improved ease of OH activity. IE: pain at 100 deg elevation and 85 deg abduction  2. Pt will demonstrate right shoulder SHEKHAR to C2 without increased pain to improve self care and grooming ease. IE: SHEKHAR to vertex  3. Pt will report ability to lift coffee pot without increase in pain to improve RTC strength and functional independence.               IE: unable to lift coffee pot due to pain    PLAN  []  Upgrade activities as tolerated     [x]  Continue plan of care  []  Update interventions per flow sheet       []  Discharge due to:_  []  Other:_      [employer] Client MOIZ MACK 5/12/2020  9:37 AM    Future Appointments   Date Time Provider Rosemary Gardner   5/14/2020  9:30 AM Lauro PERSAUD   5/18/2020 10:45 AM Tiana Solorio  [facility]

## 2020-05-14 ENCOUNTER — HOSPITAL ENCOUNTER (OUTPATIENT)
Dept: PHYSICAL THERAPY | Age: 60
End: 2020-05-14
Payer: MEDICARE

## 2020-05-18 ENCOUNTER — VIRTUAL VISIT (OUTPATIENT)
Dept: CARDIOLOGY CLINIC | Age: 60
End: 2020-05-18

## 2020-05-18 VITALS
SYSTOLIC BLOOD PRESSURE: 150 MMHG | HEIGHT: 66 IN | BODY MASS INDEX: 27.32 KG/M2 | WEIGHT: 170 LBS | DIASTOLIC BLOOD PRESSURE: 80 MMHG

## 2020-05-18 DIAGNOSIS — I10 ESSENTIAL HYPERTENSION, BENIGN: ICD-10-CM

## 2020-05-18 DIAGNOSIS — E78.5 DYSLIPIDEMIA, GOAL LDL BELOW 70: ICD-10-CM

## 2020-05-18 DIAGNOSIS — Z98.61 POSTSURGICAL PERCUTANEOUS TRANSLUMINAL CORONARY ANGIOPLASTY STATUS: ICD-10-CM

## 2020-05-18 DIAGNOSIS — I25.10 CORONARY ARTERY DISEASE INVOLVING NATIVE CORONARY ARTERY OF NATIVE HEART WITHOUT ANGINA PECTORIS: Primary | ICD-10-CM

## 2020-05-18 RX ORDER — LIDOCAINE 50 MG/G
PATCH TOPICAL EVERY 24 HOURS
COMMUNITY
End: 2022-08-23

## 2020-05-18 RX ORDER — NITROGLYCERIN 0.4 MG/1
0.4 TABLET SUBLINGUAL
Qty: 25 TAB | Refills: 0 | Status: SHIPPED | OUTPATIENT
Start: 2020-05-18

## 2020-05-18 RX ORDER — METFORMIN HYDROCHLORIDE 500 MG/1
500 TABLET ORAL
COMMUNITY

## 2020-05-18 NOTE — PATIENT INSTRUCTIONS
Medications Discontinued During This Encounter Medication Reason  ALPRAZolam (XANAX) 0.5 mg tablet Therapy Completed  cyclobenzaprine (FLEXERIL) 10 mg tablet Therapy Completed  oxyCODONE IR (ROXICODONE) 5 mg immediate release tablet Alternate Therapy  promethazine (PHENERGAN) 25 mg tablet Not A Current Medication  sitaGLIPtin (JANUVIA) 100 mg tablet Alternate Therapy After the recommended changes have been made in blood pressure medicines, patient advised to keep BP/HR(pulse rate) chart twice daily and bring us results in next 1 week or so. Patient may send the results via \"My Chart\" if desired. Please rest for 5-10 minutes before checking blood pressure. Sit on a comfortable chair without crossing the legs and put your arm on a table. We recommend that you use an upper arm cuff. Check the blood pressure 3 times weekly and take the lowest reading. If you check the blood pressure in both arms, use the higher reading. A Healthy Heart: Care Instructions Your Care Instructions Heart disease occurs when a substance called plaque builds up in the vessels that supply oxygen-rich blood to your heart. This can narrow the blood vessels and reduce blood flow. A heart attack happens when blood flow is completely blocked. A high-fat diet, smoking, and other factors increase the risk of heart disease. Your doctor has found that you have a chance of having heart disease. You can do lots of things to keep your heart healthy. It may not be easy, but you can change your diet, exercise more, and quit smoking. These steps really work to lower your chance of heart disease. Follow-up care is a key part of your treatment and safety. Be sure to make and go to all appointments, and call your doctor if you are having problems. It's also a good idea to know your test results and keep a list of the medicines you take. How can you care for yourself at home? Diet   · Use less salt when you cook and eat. This helps lower your blood pressure. Taste food before salting. Add only a little salt when you think you need it. With time, your taste buds will adjust to less salt.  
  · Eat fewer snack items, fast foods, canned soups, and other high-salt, high-fat, processed foods.  
  · Read food labels and try to avoid saturated and trans fats. They increase your risk of heart disease by raising cholesterol levels.  
  · Limit the amount of solid fat-butter, margarine, and shortening-you eat. Use olive, peanut, or canola oil when you cook. Bake, broil, and steam foods instead of frying them.  
  · Eat a variety of fruit and vegetables every day. Dark green, deep orange, red, or yellow fruits and vegetables are especially good for you. Examples include spinach, carrots, peaches, and berries.  
  · Foods high in fiber can reduce your cholesterol and provide important vitamins and minerals. High-fiber foods include whole-grain cereals and breads, oatmeal, beans, brown rice, citrus fruits, and apples.  
  · Eat lean proteins. Heart-healthy proteins include seafood, lean meats and poultry, eggs, beans, peas, nuts, seeds, and soy products.  
  · Limit drinks and foods with added sugar. These include candy, desserts, and soda pop.  
 Lifestyle changes 
  · If your doctor recommends it, get more exercise. Walking is a good choice. Bit by bit, increase the amount you walk every day. Try for at least 30 minutes on most days of the week. You also may want to swim, bike, or do other activities.  
  · Do not smoke. If you need help quitting, talk to your doctor about stop-smoking programs and medicines. These can increase your chances of quitting for good. Quitting smoking may be the most important step you can take to protect your heart. It is never too late to quit. You will get health benefits right away.  
  · Limit alcohol to 2 drinks a day for men and 1 drink a day for women. Too much alcohol can cause health problems. Medicines 
  · Take your medicines exactly as prescribed. Call your doctor if you think you are having a problem with your medicine.  
  · If your doctor recommends aspirin, take the amount directed each day. Make sure you take aspirin and not another kind of pain reliever, such as acetaminophen (Tylenol). If you take ibuprofen (such as Advil or Motrin) for other problems, take aspirin at least 2 hours before taking ibuprofen. When should you call for help? Call 911 if you have symptoms of a heart attack. These may include: 
  · Chest pain or pressure, or a strange feeling in the chest.  
  · Sweating.  
  · Shortness of breath.  
  · Pain, pressure, or a strange feeling in the back, neck, jaw, or upper belly or in one or both shoulders or arms.  
  · Lightheadedness or sudden weakness.  
  · A fast or irregular heartbeat.  
 After you call  911, the  may tell you to chew 1 adult-strength or 2 to 4 low-dose aspirin. Wait for an ambulance. Do not try to drive yourself. 
 Watch closely for changes in your health, and be sure to contact your doctor if you have any problems. Where can you learn more? Go to http://kelly-jennie.info/ Enter J256 in the search box to learn more about \"A Healthy Heart: Care Instructions. \" Current as of: December 15, 2019Content Version: 12.4 © 3701-8373 [employer]. Care instructions adapted under license by [employer] (which disclaims liability or warranty for this information). If you have questions about a medical condition or this instruction, always ask your healthcare professional. [address] any warranty or liability for your use of this information.

## 2020-05-18 NOTE — PROGRESS NOTES
HISTORY OF PRESENT ILLNESS  Gina Kohler is a 64 y.o. female. Gina Kohler is a 64 y.o. female who was seen by synchronous (real-time) audio-video technology on 5/18/2020. Consent:  She and/or her healthcare decision maker is aware that this patient-initiated Telehealth encounter is a billable service, with coverage as determined by her insurance carrier. She is aware that she may receive a bill and has provided verbal consent to proceed: Yes    I was in the office while conducting this encounter. Hypertension   The history is provided by the patient. This is a chronic problem. The current episode started more than 1 week ago. The problem occurs rarely. The problem has not changed since onset. Pertinent negatives include no chest pain, no headaches and no shortness of breath. Cholesterol Problem   The history is provided by the patient. This is a chronic problem. The current episode started more than 1 week ago. The problem has not changed since onset. Pertinent negatives include no chest pain, no headaches and no shortness of breath. Swelling   The history is provided by the patient. This is a chronic problem. The current episode started more than 1 week ago. The problem occurs rarely. The problem has been resolved. Pertinent negatives include no chest pain, no headaches and no shortness of breath. Chest Pain (Angina)    The history is provided by the patient. This is a chronic problem. The current episode started more than 1 week ago. The problem has been resolved. The problem occurs rarely. The pain is associated with normal activity and exertion. The pain is present in the substernal region. The pain is at a severity of 3/10. The quality of the pain is described as tightness. The pain does not radiate.  Pertinent negatives include no claudication, no cough, no diaphoresis, no dizziness, no fever, no headaches, no hemoptysis, no lower extremity edema, no malaise/fatigue, no nausea, no near-syncope, no orthopnea, no palpitations, no PND, no shortness of breath, no sputum production, no vomiting and no weakness. Risk factors include cardiac disease, smoking/tobacco exposure, hypertension, diabetes mellitus and dyslipidemia. Her past medical history is significant for DM and HTN. Procedural history includes cardiac catheterization, echocardiogram and cardiac stents. Hospital Follow Up   The history is provided by the medical records (NSTEMI). Pertinent negatives include no chest pain, no headaches and no shortness of breath. Review of Systems   Constitutional: Negative for chills, diaphoresis, fever, malaise/fatigue and weight loss. HENT: Negative for ear pain, hearing loss and nosebleeds. Eyes: Negative for blurred vision and discharge. Respiratory: Negative for cough, hemoptysis, sputum production, shortness of breath, wheezing and stridor. Cardiovascular: Negative for chest pain, palpitations, orthopnea, claudication, leg swelling, PND and near-syncope. Gastrointestinal: Negative for diarrhea, heartburn, nausea and vomiting. Genitourinary: Negative for dysuria and hematuria. Musculoskeletal: Negative for joint pain, myalgias and neck pain. Skin: Negative for rash. Neurological: Negative for dizziness, tingling, tremors, focal weakness, seizures, loss of consciousness, weakness and headaches. Endo/Heme/Allergies: Negative for polydipsia. Does not bruise/bleed easily. Psychiatric/Behavioral: Negative for depression, substance abuse and suicidal ideas. The patient does not have insomnia.       Family History   Problem Relation Age of Onset    Heart Disease Other        Past Medical History:   Diagnosis Date    Arthritis     CAD (coronary artery disease) 2015    Diabetes ([facility] [address] 2015    Dyspnea on exertion     Essential hypertension, benign     GERD (gastroesophageal reflux disease)     H/O emotional problems     History of fall 07/01/14    Twisted right knee    Hypertension     Osteoarthritis of right knee     Osteoarthritis of right knee 2016    Other and unspecified hyperlipidemia     Pre-operative cardiovascular examination     Rheumatoid arthritis ([address]        Past Surgical History:   Procedure Laterality Date    CARDIAC SURG PROCEDURE UNLIST      2 stents    HX APPENDECTOMY      HX  SECTION      HX GYN      endometrial ablation    HX HYSTEROSCOPY WITH ENDOMETRIAL ABLATION      HX KNEE ARTHROSCOPY Right     HX LAP CHOLECYSTECTOMY      HX ORTHOPAEDIC      left foot spur removal       Social History     Tobacco Use    Smoking status: Current Every Day Smoker     Packs/day: 0.50     Years: 1.00     Pack years: 0.50    Smokeless tobacco: Never Used    Tobacco comment: 10 per day per patient     Substance Use Topics    Alcohol use: Yes     Alcohol/week: 0.0 standard drinks     Comment: rare 1-2 yr       Allergies   Allergen Reactions    Oxycodone Itching       Outpatient Medications Marked as Taking for the 20 encounter (Virtual Visit) with Marcelo Scott MD   Medication Sig Dispense Refill    metFORMIN (GLUCOPHAGE) 500 mg tablet Take  by mouth two (2) times daily (with meals).  lidocaine (LIDODERM) 5 % by TransDERmal route every twenty-four (24) hours. Apply patch to the affected area for 12 hours a day and remove for 12 hours a day.  atorvastatin (LIPITOR) 80 mg tablet Take 1 Tab by mouth nightly. 30 Tab 0    losartan (COZAAR) 50 mg tablet Take 1 Tab by mouth two (2) times a day. 60 Tab 0    diclofenac (FLECTOR) 1.3 % pt12 1 Patch by TransDERmal route every twelve (12) hours every twelve (12) hours. Indications: Right shoulder 30 Patch 0    clopidogreL (PLAVIX) 75 mg tab Take 1 Tab by mouth daily. 30 Tab 0    acetaminophen (TYLENOL) 500 mg tablet Take 1,000 mg by mouth every eight (8) hours as needed for Pain.  amLODIPine (NORVASC) 10 mg tablet Take 10 mg by mouth daily.       hydroxychloroquine (PLAQUENIL) 200 mg tablet Take 200 mg by mouth two (2) times a day.  venlafaxine-ER 24 HR (EFFEXOR-ER) 37.5 mg tr24 tablet Take 37.5 mg by mouth daily.  nitroglycerin (NITROSTAT) 0.4 mg SL tablet PLACE 1 TABLET UNDER THE TONGUE EVERY 5 MINUTES AS NEEDED FOR CHEST PAIN FOR UP TO 3 DOSES 25 Tab 0    omeprazole (PRILOSEC) 40 mg capsule Take 40 mg by mouth daily.  diphenhydrAMINE (BENADRYL) 25 mg capsule Take 25 mg by mouth every six (6) hours as needed for Itching.  metoprolol succinate (TOPROL-XL) 25 mg XL tablet Take 1 Tab by mouth daily. (Patient taking differently: Take 50 mg by mouth daily.) 30 Tab 1    aspirin delayed-release 81 mg tablet Take 1 Tab by mouth daily. (Patient taking differently: Take 325 mg by mouth two (2) times a day.) 30 Tab 11    gabapentin (NEURONTIN) 600 mg tablet Take 600 mg by mouth two (2) times a day. Visit Vitals  Ht 5' 6\" (1.676 m)   Wt 77.1 kg (170 lb)   BMI 27.44 kg/m²     Physical Exam   Constitutional: She is oriented to person, place, and time. She appears well-developed and well-nourished. No distress. HENT:   Head: Atraumatic. Eyes: Conjunctivae are normal. No scleral icterus. Neck: Neck supple. No JVD present. Cardiovascular:   Murmur: 2/6 holosystolic murmur best heard at apex. Pulmonary/Chest: Effort normal. No stridor. Abdominal: Soft. There is no abdominal tenderness. There is no rebound. Musculoskeletal: Normal range of motion. General: Swelling present. No tenderness or edema. Neurological: She is alert and oriented to person, place, and time. She exhibits normal muscle tone. Skin: Skin is warm. She is not diaphoretic. Psychiatric: She has a normal mood and affect. Her behavior is normal.     HISTORY OF PRESENT ILLNESS  Kristin Richards is a 64 y.o. female. Hypertension   The history is provided by the patient. This is a chronic problem. The current episode started more than 1 week ago.  The problem occurs rarely. The problem has not changed since onset. Associated symptoms include chest pain. Pertinent negatives include no shortness of breath. Cholesterol Problem   The history is provided by the patient. This is a chronic problem. The current episode started more than 1 week ago. The problem has not changed since onset. Associated symptoms include chest pain. Pertinent negatives include no shortness of breath. Swelling   The history is provided by the patient. This is a chronic problem. The current episode started more than 1 week ago. The problem occurs rarely. Associated symptoms include chest pain. Pertinent negatives include no shortness of breath. Chest Pain (Angina)    The history is provided by the patient. This is a chronic problem. The current episode started more than 1 week ago. The problem has been rapidly improving. The problem occurs rarely. The pain is associated with normal activity and exertion. The pain is present in the substernal region. The pain is at a severity of 3/10. The quality of the pain is described as tightness. The pain does not radiate. Pertinent negatives include no claudication, no cough, no diaphoresis, no dizziness, no fever, no hemoptysis, no lower extremity edema, no nausea, no near-syncope, no orthopnea, no palpitations, no PND, no shortness of breath, no sputum production, no vomiting and no weakness. Risk factors include cardiac disease, smoking/tobacco exposure, hypertension, diabetes mellitus and dyslipidemia. Her past medical history is significant for DM and HTN. Procedural history includes cardiac catheterization, echocardiogram and cardiac stents. Review of Systems   Constitutional: Negative for chills, diaphoresis, fever and weight loss. HENT: Negative for ear pain and hearing loss. Eyes: Negative for blurred vision. Respiratory: Negative for cough, hemoptysis, sputum production, shortness of breath, wheezing and stridor.     Cardiovascular: Positive for chest pain. Negative for palpitations, orthopnea, claudication, leg swelling, PND and near-syncope. Gastrointestinal: Negative for heartburn, nausea and vomiting. Musculoskeletal: Negative for myalgias and neck pain. Skin: Negative for rash. Neurological: Negative for dizziness, tingling, tremors, focal weakness, loss of consciousness and weakness. Psychiatric/Behavioral: Negative for depression and suicidal ideas. Family History   Problem Relation Age of Onset    Heart Disease Other        Past Medical History:   Diagnosis Date    Arthritis     CAD (coronary artery disease)     Diabetes ([address]     Dyspnea on exertion     Essential hypertension, benign     GERD (gastroesophageal reflux disease)     H/O emotional problems     History of fall 14    Twisted right knee    Hypertension     Osteoarthritis of right knee     Osteoarthritis of right knee 2016    Other and unspecified hyperlipidemia     Pre-operative cardiovascular examination     Rheumatoid arthritis ([facility] [address]        Past Surgical History:   Procedure Laterality Date    CARDIAC SURG PROCEDURE UNLIST      2 stents    HX APPENDECTOMY      HX  SECTION      HX GYN      endometrial ablation    HX HYSTEROSCOPY WITH ENDOMETRIAL ABLATION      HX KNEE ARTHROSCOPY Right     HX LAP CHOLECYSTECTOMY      HX ORTHOPAEDIC      left foot spur removal       Social History     Tobacco Use    Smoking status: Current Every Day Smoker     Packs/day: 0.50     Years: 1.00     Pack years: 0.50    Smokeless tobacco: Never Used    Tobacco comment: 10 per day per patient     Substance Use Topics    Alcohol use:  Yes     Alcohol/week: 0.0 standard drinks     Comment: rare 1-2 yr       Allergies   Allergen Reactions    Oxycodone Itching       Outpatient Medications Marked as Taking for the 20 encounter (Virtual Visit) with Leana Astudillo MD   Medication Sig Dispense Refill    metFORMIN (GLUCOPHAGE) 500 mg tablet Take  by mouth two (2) times daily (with meals).  lidocaine (LIDODERM) 5 % by TransDERmal route every twenty-four (24) hours. Apply patch to the affected area for 12 hours a day and remove for 12 hours a day.  atorvastatin (LIPITOR) 80 mg tablet Take 1 Tab by mouth nightly. 30 Tab 0    losartan (COZAAR) 50 mg tablet Take 1 Tab by mouth two (2) times a day. 60 Tab 0    diclofenac (FLECTOR) 1.3 % pt12 1 Patch by TransDERmal route every twelve (12) hours every twelve (12) hours. Indications: Right shoulder 30 Patch 0    clopidogreL (PLAVIX) 75 mg tab Take 1 Tab by mouth daily. 30 Tab 0    acetaminophen (TYLENOL) 500 mg tablet Take 1,000 mg by mouth every eight (8) hours as needed for Pain.  amLODIPine (NORVASC) 10 mg tablet Take 10 mg by mouth daily.  hydroxychloroquine (PLAQUENIL) 200 mg tablet Take 200 mg by mouth two (2) times a day.  venlafaxine-ER 24 HR (EFFEXOR-ER) 37.5 mg tr24 tablet Take 37.5 mg by mouth daily.  nitroglycerin (NITROSTAT) 0.4 mg SL tablet PLACE 1 TABLET UNDER THE TONGUE EVERY 5 MINUTES AS NEEDED FOR CHEST PAIN FOR UP TO 3 DOSES 25 Tab 0    omeprazole (PRILOSEC) 40 mg capsule Take 40 mg by mouth daily.  diphenhydrAMINE (BENADRYL) 25 mg capsule Take 25 mg by mouth every six (6) hours as needed for Itching.  metoprolol succinate (TOPROL-XL) 25 mg XL tablet Take 1 Tab by mouth daily. (Patient taking differently: Take 50 mg by mouth daily.) 30 Tab 1    aspirin delayed-release 81 mg tablet Take 1 Tab by mouth daily. (Patient taking differently: Take 325 mg by mouth two (2) times a day.) 30 Tab 11    gabapentin (NEURONTIN) 600 mg tablet Take 600 mg by mouth two (2) times a day. Visit Vitals  Ht 5' 6\" (1.676 m)   Wt 77.1 kg (170 lb)   BMI 27.44 kg/m²     Physical Exam   Constitutional: She is oriented to person, place, and time. She appears well-developed and well-nourished. No distress. HENT:   Head: Atraumatic.      Eyes: Conjunctivae are normal. No scleral icterus. Neck: Neck supple. No JVD present. Cardiovascular: Normal rate and regular rhythm. Exam reveals no gallop. Pulmonary/Chest: Effort normal and breath sounds normal. No stridor. Abdominal: Soft. There is no tenderness. There is no rebound. Musculoskeletal: Normal range of motion. She exhibits no edema or tenderness. Neurological: She is alert and oriented to person, place, and time. Skin: Skin is warm. She is not diaphoretic. Psychiatric: She has a normal mood and affect. Her behavior is normal.       ASSESSMENT and PLAN    HLD : Results for Lynnwood Sacks (MRN [id number]) as of 5/18/2020 10:49   Ref. Range 4/15/2020 02:15   Triglyceride Latest Ref Range: <150 MG/ (H)   Cholesterol, total Latest Ref Range: <200 MG/ (H)   HDL Cholesterol Latest Ref Range: 40 - 60 MG/DL 61 (H)   CHOL/HDL Ratio Latest Ref Range: 0 - 5.0   5.8 (H)   VLDL, calculated Latest Units: MG/DL 41.2   LDL, calculated Latest Ref Range: 0 - 100 MG/.8 (H)     CAD: Recurrent non-STEMI in 4/20. Medical treatment as no large vessel disease on cardiac cath. CAD stable. Continue same medications. Blood pressure is mildly elevated but comes down nicely at times. Check home chart and then adjust medications as needed. Lipids were repeated by PCP and will try to get those results. Given a very high LDL of 250 in the hospital, she may need PCSK9 inhibitors. Diet weight and exercise discussed. Diagnoses and all orders for this visit:    1. Coronary artery disease involving native coronary artery of native heart without angina pectoris    2. Postsurgical percutaneous transluminal coronary angioplasty status    3. Essential hypertension, benign    4. Dyslipidemia, goal LDL below 70    Other orders  -     nitroglycerin (NITROSTAT) 0.4 mg SL tablet; 1 Tab by SubLINGual route every five (5) minutes as needed for Chest Pain for up to 3 doses. Up to 3 doses.         Pertinent laboratory and test data reviewed and discussed with patient. See patient instructions also for other medical advice given    Medications Discontinued During This Encounter   Medication Reason    ALPRAZolam (XANAX) 0.5 mg tablet Therapy Completed    cyclobenzaprine (FLEXERIL) 10 mg tablet Therapy Completed    oxyCODONE IR (ROXICODONE) 5 mg immediate release tablet Alternate Therapy    promethazine (PHENERGAN) 25 mg tablet Not A Current Medication    sitaGLIPtin (JANUVIA) 100 mg tablet Alternate Therapy       Follow-up and Dispositions    · Return in about 3 months (around 8/18/2020), or if symptoms worsen or fail to improve, for Get labs from PCP including lipids. Vital Signs: (As obtained by patient/caregiver at home)  Visit Vitals  /80   Ht 5' 6\" (1.676 m)   Wt 77.1 kg (170 lb)   BMI 27.44 kg/m²          Other pertinent observable physical exam findings:-      We discussed the expected course, resolution and complications of the diagnosis(es) in detail. Medication risks, benefits, costs, interactions, and alternatives were discussed as indicated. I advised her to contact the office if her condition worsens, changes or fails to improve as anticipated. She expressed understanding with the diagnosis(es) and plan. Pursuant to the emergency declaration under the [facility], [facility] waiver authority and the [employer] Act, this Virtual  Visit was conducted, with patient's consent, to reduce the patient's risk of exposure to COVID-19 and provide continuity of care for an established patient. Services were provided through a video synchronous discussion virtually to substitute for in-person clinic visit.     Meme Green MD

## 2020-06-30 NOTE — PROGRESS NOTES
[facility]  [address]  [place], [address]  [phone] fax    Physical Therapy Discharge Summary  Patient name: Cesario Clancy Start of Care: 2020   Referral source: Alvin Watson MD : 1960               Medical Diagnosis: Pain in right shoulder [M25.511]  Payor: [address] [place] [address] / Plan: [address] / Product Type: Managed Care Medicare /  Onset Date:               Treatment Diagnosis: Right shoulder pain   Prior Hospitalization: see medical history Provider#: [id number]   Medications: Verified on Patient summary List    Comorbidities: MI x 3, heart dz, fibromyalgia, depression, RA, osteoporosis, Diabetes, HTN, visual impaired, tobacco use   Prior Level of Function: Pt RHD female with improved ease of ADL performance prior to shoulder pain  Visits from Start of Care: 2    Missed Visits: 1  Reporting Period : 2020 to 2020    Summary of Care:  Short Term Goals: To be accomplished in 2 weeks:  1. Pt will demonstrate I and compliance with HEP to promote self management of symptoms.             TO: HEP issued              Current: pt not initiated yet 2020  2. Pt will demonstrate right shoulder AAROM elevation to 130 deg without pain increase to improve joint mobility and stretch tolerance.              IE: pain at 100 deg elevation  Long Term Goals: To be accomplished in 4 weeks:  1. Pt will demonstrate right shoulder flexion and abduction AROM to 130 deg without pain for improved ease of OH activity.             IE: pain at 100 deg elevation and 85 deg abduction  2. Pt will demonstrate right shoulder SHEKHAR to C2 without increased pain to improve self care and grooming ease.              IE: SHEKHAR to vertex  3.  Pt will report ability to lift coffee pot without increase in pain to improve RTC strength and functional independence.              IE: unable to lift coffee pot due to pain    ASSESSMENT/RECOMMENDATIONS: The pt requested hold for MD joshi/clara due to continued pain and limited ability to participate in PT. She did not return calls for further treatment, thus will D/C at this time without ability to further assess goals.     [x]Discontinue therapy: []Patient has reached or is progressing toward set goals      [x]Patient is non-compliant or has abdicated      []Due to lack of appreciable progress towards set goals    Matt David DPT, CMTPT 6/30/2020 2:11 PM

## 2020-08-13 ENCOUNTER — VIRTUAL VISIT (OUTPATIENT)
Dept: CARDIOLOGY CLINIC | Age: 60
End: 2020-08-13

## 2020-08-13 DIAGNOSIS — E11.9 TYPE 2 DIABETES MELLITUS WITHOUT COMPLICATION, WITHOUT LONG-TERM CURRENT USE OF INSULIN (HCC): ICD-10-CM

## 2020-08-13 DIAGNOSIS — E78.5 DYSLIPIDEMIA, GOAL LDL BELOW 70: ICD-10-CM

## 2020-08-13 DIAGNOSIS — Z71.6 TOBACCO ABUSE COUNSELING: ICD-10-CM

## 2020-08-13 DIAGNOSIS — I25.118 CORONARY ARTERY DISEASE OF NATIVE ARTERY OF NATIVE HEART WITH STABLE ANGINA PECTORIS (HCC): Primary | ICD-10-CM

## 2020-08-13 DIAGNOSIS — I10 ESSENTIAL HYPERTENSION, BENIGN: ICD-10-CM

## 2020-08-13 DIAGNOSIS — Z98.61 POSTSURGICAL PERCUTANEOUS TRANSLUMINAL CORONARY ANGIOPLASTY STATUS: ICD-10-CM

## 2020-08-13 RX ORDER — DOXYCYCLINE 100 MG/1
100 CAPSULE ORAL 2 TIMES DAILY
COMMUNITY
End: 2022-08-23

## 2020-08-13 RX ORDER — EZETIMIBE 10 MG/1
10 TABLET ORAL DAILY
Qty: 30 TAB | Refills: 3 | Status: ON HOLD | OUTPATIENT
Start: 2020-08-13 | End: 2020-08-28

## 2020-08-13 NOTE — PROGRESS NOTES
Unable to obtain BP  HR: 68  TEMP: 97.6  HT: 5'6   WT: 165 lb      1. Have you been to the ER, urgent care clinic since your last visit? Hospitalized since your last visit? No    2. Have you seen or consulted any other health care providers outside of the [address] [place] [address] since your last visit? Include any pap smears or colon screening. Yes Where: [facility] Reason for visit: Rotator Cuff     3. Since your last visit, have you had any of the following symptoms? chest pains. 4.  Have you had any blood work, X-rays or cardiac testing? No    5. Where do you normally have your labs drawn? [facility]    6. Do you need any refills today?    No

## 2020-08-13 NOTE — PATIENT INSTRUCTIONS
Medications Discontinued During This Encounter   Medication Reason    diclofenac (FLECTOR) 1.3 % pt12 Therapy Completed     After the recommended changes have been made in blood pressure medicines, patient advised to keep BP/HR(pulse rate) chart twice daily and bring us results in next 1 week or so. Patient may send the results via \"My Chart\" if desired. Please rest for 5-10 minutes before checking blood pressure. Sit on a comfortable chair without crossing the legs and put your arm on a table. We recommend that you use an upper arm cuff. Check the blood pressure 3 times weekly and take the lowest reading. If you check the blood pressure in both arms, use the higher reading. Learning About Benefits From Quitting Smoking  How does quitting smoking make you healthier? If you're thinking about quitting smoking, you may have a few reasons to be smoke-free. Your health may be one of them. · When you quit smoking, you lower your risks for cancer, lung disease, heart attack, stroke, blood vessel disease, and blindness from macular degeneration. · When you're smoke-free, you get sick less often, and you heal faster. You are less likely to get colds, flu, bronchitis, and pneumonia. · As a nonsmoker, you may find that your mood is better and you are less stressed. When and how will you feel healthier? Quitting has real health benefits that start from day 1 of being smoke-free. And the longer you stay smoke-free, the healthier you get and the better you feel. The first hours  · After just 20 minutes, your blood pressure and heart rate go down. That means there's less stress on your heart and blood vessels. · Within 12 hours, the level of carbon monoxide in your blood drops back to normal. That makes room for more oxygen. With more oxygen in your body, you may notice that you have more energy than when you smoked. After 2 weeks  · Your lungs start to work better.   · Your risk of heart attack starts to drop.  After 1 month  · When your lungs are clear, you cough less and breathe deeper, so it's easier to be active. · Your sense of taste and smell return. That means you can enjoy food more than you have since you started smoking. Over the years  · Over the years, your risks of heart disease, heart attack, and stroke are lower. · After 10 years, your risk of dying from lung cancer is cut by about half. And your risk for many other types of cancer is lower too. How would quitting help others in your life? When you quit smoking, you improve the health of everyone who now breathes in your smoke. · Their heart, lung, and cancer risks drop, much like yours. · They are sick less. For babies and small children, living smoke-free means they're less likely to have ear infections, pneumonia, and bronchitis. · If you're a woman who is or will be pregnant someday, quitting smoking means a healthier . · Children who are close to you are less likely to become adult smokers. Where can you learn more? Go to http://kelly-jennie.info/  Enter O319 in the search box to learn more about \"Learning About Benefits From Quitting Smoking. \"  Current as of: 2020               Content Version: 12.5  © 9067-2927 Healthwise, Incorporated. Care instructions adapted under license by [employer] (which disclaims liability or warranty for this information). If you have questions about a medical condition or this instruction, always ask your healthcare professional. [place] [address] any warranty or liability for your use of this information.

## 2020-08-13 NOTE — PROGRESS NOTES
HISTORY OF PRESENT ILLNESS  Maryanne Coles is a 64 y.o. female. Maryanne Coles is a 64 y.o. female who was seen by synchronous (real-time) audio-video technology on 8/13/2020. Consent:  She and/or her healthcare decision maker is aware that this patient-initiated Telehealth encounter is a billable service, with coverage as determined by her insurance carrier. She is aware that she may receive a bill and has provided verbal consent to proceed: Yes    I was in the office while conducting this encounter. Hypertension   The history is provided by the patient. This is a chronic problem. The current episode started more than 1 week ago. The problem occurs rarely. The problem has not changed since onset. Associated symptoms include chest pain. Pertinent negatives include no headaches and no shortness of breath. Cholesterol Problem   The history is provided by the patient. This is a chronic problem. The current episode started more than 1 week ago. The problem has not changed since onset. Associated symptoms include chest pain. Pertinent negatives include no headaches and no shortness of breath. Chest Pain (Angina)    The history is provided by the patient. This is a recurrent problem. The current episode started more than 1 week ago. The problem occurs rarely (Once in 6 months). The pain is associated with normal activity and exertion. The pain is present in the substernal region. The pain is at a severity of 3/10. The quality of the pain is described as tightness. The pain does not radiate. Pertinent negatives include no claudication, no cough, no diaphoresis, no dizziness, no fever, no headaches, no hemoptysis, no lower extremity edema, no malaise/fatigue, no nausea, no near-syncope, no orthopnea, no palpitations, no PND, no shortness of breath, no sputum production, no vomiting and no weakness. She has tried nitroglycerin for the symptoms. The treatment provided significant relief.  Risk factors include cardiac disease, smoking/tobacco exposure, hypertension, diabetes mellitus and dyslipidemia. Her past medical history is significant for DM and HTN. Procedural history includes cardiac catheterization, echocardiogram and cardiac stents. Allergies   Allergen Reactions    Oxycodone Itching       Past Medical History:   Diagnosis Date    Arthritis     CAD (coronary artery disease) 2015    [facility]    Dyspnea on exertion     Essential hypertension, benign     GERD (gastroesophageal reflux disease)     H/O emotional problems     History of fall 07/01/14    Twisted right knee    Hypertension 1995    Osteoarthritis of right knee     Osteoarthritis of right knee 9/22/2016    Other and unspecified hyperlipidemia     Pre-operative cardiovascular examination     Rheumatoid arthritis ([facility]        Family History   Problem Relation Age of Onset    Heart Disease Other        Social History     Tobacco Use    Smoking status: Current Every Day Smoker     Packs/day: 0.50     Years: 1.00     Pack years: 0.50    Smokeless tobacco: Never Used    Tobacco comment: 10 per day per patient     Substance Use Topics    Alcohol use: Yes     Alcohol/week: 0.0 standard drinks     Comment: rare 1-2 yr    Drug use: No        Current Outpatient Medications   Medication Sig    doxycycline (MONODOX) 100 mg capsule Take 100 mg by mouth two (2) times a day.  ezetimibe (Zetia) 10 mg tablet Take 1 Tab by mouth daily.  metFORMIN (GLUCOPHAGE) 500 mg tablet Take  by mouth two (2) times daily (with meals).  lidocaine (LIDODERM) 5 % by TransDERmal route every twenty-four (24) hours. Apply patch to the affected area for 12 hours a day and remove for 12 hours a day.  nitroglycerin (NITROSTAT) 0.4 mg SL tablet 1 Tab by SubLINGual route every five (5) minutes as needed for Chest Pain for up to 3 doses. Up to 3 doses.  atorvastatin (LIPITOR) 80 mg tablet Take 1 Tab by mouth nightly.     losartan (COZAAR) 50 mg tablet Take 1 Tab by mouth two (2) times a day.  clopidogreL (PLAVIX) 75 mg tab Take 1 Tab by mouth daily.  acetaminophen (TYLENOL) 500 mg tablet Take 1,000 mg by mouth every eight (8) hours as needed for Pain.  amLODIPine (NORVASC) 10 mg tablet Take 10 mg by mouth daily.  hydroxychloroquine (PLAQUENIL) 200 mg tablet Take 200 mg by mouth two (2) times a day.  venlafaxine-ER 24 HR (EFFEXOR-ER) 37.5 mg tr24 tablet Take 37.5 mg by mouth daily.  omeprazole (PRILOSEC) 40 mg capsule Take 40 mg by mouth daily.  diphenhydrAMINE (BENADRYL) 25 mg capsule Take 25 mg by mouth every six (6) hours as needed for Itching.  metoprolol succinate (TOPROL-XL) 25 mg XL tablet Take 1 Tab by mouth daily. (Patient taking differently: Take 50 mg by mouth daily.)    aspirin delayed-release 81 mg tablet Take 1 Tab by mouth daily. (Patient taking differently: Take 325 mg by mouth two (2) times a day.)    gabapentin (NEURONTIN) 600 mg tablet Take 600 mg by mouth two (2) times a day. No current facility-administered medications for this visit. Past Surgical History:   Procedure Laterality Date    CARDIAC SURG PROCEDURE UNLIST      2 stents    HX APPENDECTOMY      HX  SECTION      HX GYN      endometrial ablation    HX HYSTEROSCOPY WITH ENDOMETRIAL ABLATION      HX KNEE ARTHROSCOPY Right     HX LAP CHOLECYSTECTOMY      HX ORTHOPAEDIC      left foot spur removal       There were no vitals taken for this visit. Diagnostic Studies:  I have reviewed the relevant tests done on the patient and show as follows  EKG tracings reviewed by me today. EKG Results     None        XR Results (most recent):  Results from Hospital Encounter encounter on 20   XR CHEST PORT    Narrative AP CHEST, PORTABLE    INDICATION: Chest pain    COMPARISON: Prior chest x-rays, most recent 2019    FINDINGS:  EKG leads overlie the patient. The cardiac silhouette is normal in size.   The pulmonary vasculature is  unremarkable. No focal consolidation, pleural effusion, or pneumothorax. No  acute osseous abnormalities are identified. Impression Impression:    No acute finding. 04/14/20   ECHO ADULT COMPLETE 04/15/2020 4/15/2020    Narrative · Normal cavity size and systolic function (ejection fraction normal). Mild concentric hypertrophy. Estimated left ventricular ejection fraction   is 60 - 65%. Visually measured ejection fraction. No regional wall motion   abnormality noted. · Moderate aortic valve regurgitation is present. · Pulmonary arterial systolic pressure is 15 mmHg. Signed by: Deena Funez MD          04/14/20   CARDIAC PROCEDURE 04/15/2020 4/15/2020    Narrative · Patent previous stents in the right PDA and proximal/mid LAD. · Critical lesions involve very small branches-first diagonal 90% ostium   and a branch of the first right posterior lateral artery. · Noncritical stenosis in the mid circumflex of about 40% with IFR of   0.95. Medical treatment and risk factor modification. Patient will need PCSK9 inhibitors to lower the LDL as it is severely   elevated despite Lipitor 40. Signed by: Kalen Jerome MD       Ms. Boo Roque has a reminder for a \"due or due soon\" health maintenance. I have asked that she contact her primary care provider for follow-up on this health maintenance. Review of Systems   Constitutional: Negative for chills, diaphoresis, fever, malaise/fatigue and weight loss. HENT: Negative for nosebleeds. Eyes: Negative for discharge. Respiratory: Negative for cough, hemoptysis, sputum production, shortness of breath and wheezing. Cardiovascular: Positive for chest pain. Negative for palpitations, orthopnea, claudication, leg swelling, PND and near-syncope. Gastrointestinal: Negative for diarrhea, nausea and vomiting. Genitourinary: Negative for dysuria and hematuria.    Musculoskeletal: Negative for joint pain.   Skin: Negative for rash. Neurological: Negative for dizziness, seizures, loss of consciousness, weakness and headaches. Endo/Heme/Allergies: Negative for polydipsia. Does not bruise/bleed easily. Psychiatric/Behavioral: Negative for depression and substance abuse. The patient does not have insomnia. Physical Exam   Constitutional: She is oriented to person, place, and time. She appears well-developed and well-nourished. No distress. HENT:   Head: Normocephalic and atraumatic. Nose: Nose normal.   Eyes: Conjunctivae and EOM are normal. Right eye exhibits no discharge. Left eye exhibits no discharge. No scleral icterus. Neck: Normal range of motion. No JVD present. No thyromegaly present. Pulmonary/Chest: Effort normal. No accessory muscle usage or stridor. Abdominal: Soft. She exhibits no distension. Musculoskeletal: Normal range of motion. General: Swelling present. No edema. Neurological: She is alert and oriented to person, place, and time. Gait normal.   No obvious facial asymmetry   Skin: No rash noted. No erythema. Psychiatric: She has a normal mood and affect. Her behavior is normal.       ASSESSMENT and PLAN    Patient advised to stop smoking to reduce future cardiovascular events. Patient is stable with chronic stable angina. She had angina once in the last 6 months. She continues to smoke and was counseled against smoking and she understands it well. She is upcoming for a shoulder surgery for rotator cuff. I think she will carry a mild risk for that. Add Zetia to better control the lipids and check a profile in 6 weeks. She likely has familial hyperlipidemia with LDL of 250 in 4/20. Continue same medications for CAD. Check home chart for blood pressure and may need to adjust medications. She informed me that her blood pressure is usually 150/70 in other physicians office but it may have been mildly elevated due to pain.       Diagnoses and all orders for this visit:    1. Coronary artery disease of native artery of native heart with stable angina pectoris ([facility]    2. Postsurgical percutaneous transluminal coronary angioplasty status    3. Essential hypertension, benign  -     METABOLIC PANEL, BASIC; Future    4. Dyslipidemia, goal LDL below 70  -     LIPID PANEL; Future  -     HEPATIC FUNCTION PANEL; Future  -     ezetimibe (Zetia) 10 mg tablet; Take 1 Tab by mouth daily. 5. Type 2 diabetes mellitus without complication, without long-term current use of insulin ([facility]    6. Tobacco abuse counseling        Pertinent laboratory and test data reviewed and discussed with patient. See patient instructions also for other medical advice given    Medications Discontinued During This Encounter   Medication Reason    diclofenac (FLECTOR) 1.3 % pt12 Therapy Completed       Follow-up and Dispositions    · Return in about 3 months (around 11/13/2020), or if symptoms worsen or fail to improve, for post test.         Vital Signs: (As obtained by patient/caregiver at home)  There were no vitals taken for this visit. Other pertinent observable physical exam findings:-      We discussed the expected course, resolution and complications of the diagnosis(es) in detail. Medication risks, benefits, costs, interactions, and alternatives were discussed as indicated. I advised her to contact the office if her condition worsens, changes or fails to improve as anticipated. She expressed understanding with the diagnosis(es) and plan. Pursuant to the emergency declaration under the [facility] waiver authority and the [employer] General Act, this Virtual  Visit was conducted, with patient's consent, to reduce the patient's risk of exposure to COVID-19 and provide continuity of care for an established patient.      Services were provided through a video synchronous discussion virtually to substitute for in-person clinic visit.     Fay Saavedra MD

## 2021-08-03 PROBLEM — I25.10 CAD (CORONARY ARTERY DISEASE): Status: RESOLVED | Noted: 2020-04-14 | Resolved: 2021-08-03

## 2021-08-03 PROBLEM — E78.5 OTHER AND UNSPECIFIED HYPERLIPIDEMIA: Status: RESOLVED | Noted: 2021-08-03 | Resolved: 2021-08-03

## 2022-03-18 PROBLEM — R07.9 CHEST PAIN: Status: ACTIVE | Noted: 2020-04-14

## 2022-03-19 PROBLEM — Z98.61 POSTSURGICAL PERCUTANEOUS TRANSLUMINAL CORONARY ANGIOPLASTY STATUS: Status: ACTIVE | Noted: 2020-08-13

## 2022-03-19 PROBLEM — E87.6 HYPOKALEMIA: Status: ACTIVE | Noted: 2020-04-14

## 2022-08-02 RX ORDER — ROSUVASTATIN CALCIUM 40 MG/1
40 TABLET, COATED ORAL
COMMUNITY

## 2022-08-02 RX ORDER — GLIPIZIDE 10 MG/1
10 TABLET ORAL DAILY
COMMUNITY

## 2022-08-02 RX ORDER — ADALIMUMAB 40MG/0.8ML
KIT SUBCUTANEOUS
COMMUNITY

## 2022-08-02 RX ORDER — HYDROCHLOROTHIAZIDE 25 MG/1
25 TABLET ORAL DAILY
COMMUNITY

## 2022-08-02 NOTE — PERIOP NOTES
PRE-SURGICAL INSTRUCTIONS        Patient's Name:  Zarina PADILLA Date:  8/2/2022            Covid Testing Date and Time:    Surgery Date:  8/3/2022                Do NOT eat or drink anything, including candy, gum, or ice chips after midnight on 8/2/2022, unless you have specific instructions from your surgeon or anesthesia provider to do so. You may brush your teeth before coming to the hospital.  No smoking 24 hours prior to the day of surgery. No alcohol 24 hours prior to the day of surgery. No recreational drugs for one week prior to the day of surgery. Leave all valuables, including money/purse, at home. Remove all jewelry, nail polish, acrylic nails, and makeup (including mascara); no lotions powders, deodorant, or perfume/cologne/after shave on the skin. Follow instruction for Hibiclens washes and CHG wipes from surgeon's office. Glasses/contact lenses and dentures may be worn to the hospital.  They will be removed prior to surgery. Call your doctor if symptoms of a cold or illness develop within 24-48 hours prior to your surgery. 11.  If you are having an outpatient procedure, please make arrangements for a responsible ADULT TO [address] [place] [address] and stay with you for 24 hours after your surgery. 12. ONE VISITOR in the hospital at this time for outpatient procedures. Exceptions may be made for surgical admissions, per nursing unit guidelines      Special Instructions:      Bring list of CURRENT medications. Bring inhaler. Bring CPAP machine. Bring any pertinent legal medical records. Take these medications the morning of surgery with a sip of water:  BP/Heart meds. Follow physician instructions about insulin. Follow physician instructions about stopping anticoagulants. Complete bowel prep per MD instructions. On the day of surgery, come in the main entrance of [facility]. Let the  at the desk know you are there for surgery. A staff member will come escort you to the surgical area on the second floor. If you have any questions or concerns, please do not hesitate to call:     (Prior to the day of surgery) [facility] department:  [phone]   (Day of surgery) Pre-Op department:  [phone]    These surgical instructions were reviewed with patient during the PAT phone call.

## 2022-08-03 ENCOUNTER — HOSPITAL ENCOUNTER (OUTPATIENT)
Age: 62
Setting detail: OUTPATIENT SURGERY
Discharge: HOME | End: 2022-08-03
Attending: STUDENT IN AN ORGANIZED HEALTH CARE EDUCATION/TRAINING PROGRAM | Admitting: STUDENT IN AN ORGANIZED HEALTH CARE EDUCATION/TRAINING PROGRAM
Payer: MEDICARE

## 2022-08-03 ENCOUNTER — ANESTHESIA EVENT (OUTPATIENT)
Dept: ENDOSCOPY | Age: 62
End: 2022-08-03
Payer: MEDICARE

## 2022-08-03 ENCOUNTER — ANESTHESIA (OUTPATIENT)
Dept: ENDOSCOPY | Age: 62
End: 2022-08-03
Payer: MEDICARE

## 2022-08-03 VITALS
TEMPERATURE: 98.5 F | OXYGEN SATURATION: 100 % | BODY MASS INDEX: 26.52 KG/M2 | SYSTOLIC BLOOD PRESSURE: 103 MMHG | HEIGHT: 66 IN | WEIGHT: 165 LBS | RESPIRATION RATE: 16 BRPM | DIASTOLIC BLOOD PRESSURE: 58 MMHG | HEART RATE: 55 BPM

## 2022-08-03 LAB
GLUCOSE BLD STRIP.AUTO-MCNC: 108 MG/DL (ref 70–110)
GLUCOSE BLD STRIP.AUTO-MCNC: 131 MG/DL (ref 70–110)

## 2022-08-03 PROCEDURE — 74011250636 HC RX REV CODE- 250/636: Performed by: NURSE ANESTHETIST, CERTIFIED REGISTERED

## 2022-08-03 PROCEDURE — 82962 GLUCOSE BLOOD TEST: CPT

## 2022-08-03 PROCEDURE — 77030031670 HC DEV INFL ENDOTEK BIG60 MRTM -B: Performed by: STUDENT IN AN ORGANIZED HEALTH CARE EDUCATION/TRAINING PROGRAM

## 2022-08-03 PROCEDURE — 00813 ANES UPR LWR GI NDSC PX: CPT | Performed by: NURSE ANESTHETIST, CERTIFIED REGISTERED

## 2022-08-03 PROCEDURE — C1726 CATH, BAL DIL, NON-VASCULAR: HCPCS | Performed by: STUDENT IN AN ORGANIZED HEALTH CARE EDUCATION/TRAINING PROGRAM

## 2022-08-03 PROCEDURE — 76060000032 HC ANESTHESIA 0.5 TO 1 HR: Performed by: STUDENT IN AN ORGANIZED HEALTH CARE EDUCATION/TRAINING PROGRAM

## 2022-08-03 PROCEDURE — 74011000250 HC RX REV CODE- 250: Performed by: NURSE ANESTHETIST, CERTIFIED REGISTERED

## 2022-08-03 PROCEDURE — 88305 TISSUE EXAM BY PATHOLOGIST: CPT

## 2022-08-03 PROCEDURE — 2709999900 HC NON-CHARGEABLE SUPPLY: Performed by: STUDENT IN AN ORGANIZED HEALTH CARE EDUCATION/TRAINING PROGRAM

## 2022-08-03 PROCEDURE — 00813 ANES UPR LWR GI NDSC PX: CPT | Performed by: ANESTHESIOLOGY

## 2022-08-03 PROCEDURE — 76040000007: Performed by: STUDENT IN AN ORGANIZED HEALTH CARE EDUCATION/TRAINING PROGRAM

## 2022-08-03 PROCEDURE — 77030013992 HC SNR POLYP ENDOSC BSC -B: Performed by: STUDENT IN AN ORGANIZED HEALTH CARE EDUCATION/TRAINING PROGRAM

## 2022-08-03 PROCEDURE — 77030021593 HC FCPS BIOP ENDOSC BSC -A: Performed by: STUDENT IN AN ORGANIZED HEALTH CARE EDUCATION/TRAINING PROGRAM

## 2022-08-03 PROCEDURE — 77030008565 HC TBNG SUC IRR ERBE -B: Performed by: STUDENT IN AN ORGANIZED HEALTH CARE EDUCATION/TRAINING PROGRAM

## 2022-08-03 RX ORDER — INSULIN LISPRO 100 [IU]/ML
INJECTION, SOLUTION INTRAVENOUS; SUBCUTANEOUS ONCE
Status: DISCONTINUED | OUTPATIENT
Start: 2022-08-03 | End: 2022-08-03 | Stop reason: HOSPADM

## 2022-08-03 RX ORDER — LIDOCAINE HYDROCHLORIDE 20 MG/ML
INJECTION, SOLUTION EPIDURAL; INFILTRATION; INTRACAUDAL; PERINEURAL AS NEEDED
Status: DISCONTINUED | OUTPATIENT
Start: 2022-08-03 | End: 2022-08-03 | Stop reason: HOSPADM

## 2022-08-03 RX ORDER — SODIUM CHLORIDE, SODIUM LACTATE, POTASSIUM CHLORIDE, CALCIUM CHLORIDE 600; 310; 30; 20 MG/100ML; MG/100ML; MG/100ML; MG/100ML
75 INJECTION, SOLUTION INTRAVENOUS CONTINUOUS
Status: CANCELLED | OUTPATIENT
Start: 2022-08-03 | End: 2022-08-04

## 2022-08-03 RX ORDER — LIDOCAINE HYDROCHLORIDE 10 MG/ML
0.1 INJECTION, SOLUTION EPIDURAL; INFILTRATION; INTRACAUDAL; PERINEURAL AS NEEDED
Status: DISCONTINUED | OUTPATIENT
Start: 2022-08-03 | End: 2022-08-03 | Stop reason: HOSPADM

## 2022-08-03 RX ORDER — PROPOFOL 10 MG/ML
INJECTION, EMULSION INTRAVENOUS AS NEEDED
Status: DISCONTINUED | OUTPATIENT
Start: 2022-08-03 | End: 2022-08-03 | Stop reason: HOSPADM

## 2022-08-03 RX ORDER — INSULIN LISPRO 100 [IU]/ML
INJECTION, SOLUTION INTRAVENOUS; SUBCUTANEOUS ONCE
Status: CANCELLED | OUTPATIENT
Start: 2022-08-03 | End: 2022-08-03

## 2022-08-03 RX ORDER — EPHEDRINE SULFATE/0.9% NACL/PF 25 MG/5 ML
SYRINGE (ML) INTRAVENOUS AS NEEDED
Status: DISCONTINUED | OUTPATIENT
Start: 2022-08-03 | End: 2022-08-03 | Stop reason: HOSPADM

## 2022-08-03 RX ORDER — SODIUM CHLORIDE, SODIUM LACTATE, POTASSIUM CHLORIDE, CALCIUM CHLORIDE 600; 310; 30; 20 MG/100ML; MG/100ML; MG/100ML; MG/100ML
25 INJECTION, SOLUTION INTRAVENOUS CONTINUOUS
Status: DISCONTINUED | OUTPATIENT
Start: 2022-08-03 | End: 2022-08-03 | Stop reason: HOSPADM

## 2022-08-03 RX ADMIN — Medication 30 MG: at 09:03

## 2022-08-03 RX ADMIN — PROPOFOL 50 MG: 10 INJECTION, EMULSION INTRAVENOUS at 09:16

## 2022-08-03 RX ADMIN — PROPOFOL 100 MG: 10 INJECTION, EMULSION INTRAVENOUS at 09:02

## 2022-08-03 RX ADMIN — SODIUM CHLORIDE, POTASSIUM CHLORIDE, SODIUM LACTATE AND CALCIUM CHLORIDE 25 ML/HR: 600; 310; 30; 20 INJECTION, SOLUTION INTRAVENOUS at 08:22

## 2022-08-03 RX ADMIN — PROPOFOL 100 MG: 10 INJECTION, EMULSION INTRAVENOUS at 08:51

## 2022-08-03 RX ADMIN — FAMOTIDINE 20 MG: 10 INJECTION INTRAVENOUS at 08:25

## 2022-08-03 RX ADMIN — Medication 20 MG: at 09:16

## 2022-08-03 RX ADMIN — PROPOFOL 100 MG: 10 INJECTION, EMULSION INTRAVENOUS at 09:11

## 2022-08-03 RX ADMIN — PROPOFOL 100 MG: 10 INJECTION, EMULSION INTRAVENOUS at 08:56

## 2022-08-03 RX ADMIN — LIDOCAINE HYDROCHLORIDE 50 MG: 20 INJECTION, SOLUTION EPIDURAL; INFILTRATION; INTRACAUDAL; PERINEURAL at 08:51

## 2022-08-03 NOTE — H&P
Date of Surgery Update:  Meenu Gomez was seen and examined. History and physical has been reviewed. The patient has been examined.  There have been no significant clinical changes since the completion of the originally dated History and Physical.    Signed By: John Sanchez MD     August 3, 2022 8:38 AM

## 2022-08-03 NOTE — ANESTHESIA PREPROCEDURE EVALUATION
Relevant Problems   No relevant active problems       Anesthetic History   No history of anesthetic complications            Review of Systems / Medical History  Patient summary reviewed and pertinent labs reviewed    Pulmonary        Sleep apnea: CPAP           Neuro/Psych   Within defined limits           Cardiovascular    Hypertension: well controlled          Past MI and CAD    Exercise tolerance: >4 METS     GI/Hepatic/Renal     GERD: well controlled           Endo/Other    Diabetes: well controlled, type 2    Arthritis     Other Findings              Physical Exam    Airway  Mallampati: III  TM Distance: 4 - 6 cm  Neck ROM: decreased range of motion   Mouth opening: Normal     Cardiovascular    Rhythm: regular  Rate: normal         Dental    Dentition: Poor dentition     Pulmonary  Breath sounds clear to auscultation               Abdominal  GI exam deferred       Other Findings            Anesthetic Plan    ASA: 3  Anesthesia type: MAC            Anesthetic plan and risks discussed with: Patient

## 2022-08-03 NOTE — ANESTHESIA POSTPROCEDURE EVALUATION
Procedure(s):  UPPER ENDOSCOPY with balloon dilation & bxs  COLONOSCOPY with polypectomies.     MAC    Anesthesia Post Evaluation      Multimodal analgesia: multimodal analgesia used between 6 hours prior to anesthesia start to PACU discharge  Patient location during evaluation: bedside  Patient participation: complete - patient participated  Level of consciousness: awake  Pain management: adequate  Airway patency: patent  Anesthetic complications: no  Cardiovascular status: stable  Respiratory status: acceptable  Hydration status: acceptable  Post anesthesia nausea and vomiting:  controlled  Final Post Anesthesia Temperature Assessment:  Normothermia (36.0-37.5 degrees C)      INITIAL Post-op Vital signs:   Vitals Value Taken Time   /58 08/03/22 1007   Temp 36.9 °C (98.5 °F) 08/03/22 0926   Pulse 55 08/03/22 1007   Resp 16 08/03/22 1007   SpO2 100 % 08/03/22 1007

## 2022-08-03 NOTE — DISCHARGE INSTRUCTIONS
Upper GI Endoscopy: What to Expect at [address] had an upper GI endoscopy. Your doctor used a thin, lighted tube that bends to look at the inside of your esophagus, your stomach, and the first part of the small intestine, called the duodenum. After you have an endoscopy, you will stay at the hospital or clinic for 1 to 2 hours. This will allow the medicine to wear off. You will be able to go home after your doctor or nurse checks to make sure that you're not having any problems. You may have to stay overnight if you had treatment during the test. You may have a sore throat for a day or two after the test.  This care sheet gives you a general idea about what to expect after the test.  How can you care for yourself at home? Activity   Rest as much as you need to after you go home. You should be able to go back to your usual activities the day after the test.  Diet   Follow your doctor's directions for eating after the test.  Drink plenty of fluids (unless your doctor has told you not to). Medications   If you have a sore throat the day after the test, use an over-the-counter spray to numb your throat. Follow-up care is a key part of your treatment and safety. Be sure to make and go to all appointments, and call your doctor if you are having problems. It's also a good idea to know your test results and keep a list of the medicines you take. When should you call for help? Call 911 anytime you think you may need emergency care. For example, call if:    You passed out (lost consciousness). You have trouble breathing. You pass maroon or bloody stools. Call your doctor now or seek immediate medical care if:    You have pain that does not get better after your take pain medicine. You have new or worse belly pain. You have blood in your stools. You are sick to your stomach and cannot keep fluids down. You have a fever. You cannot pass stools or gas.    Watch closely for changes in your health, and be sure to contact your doctor if:    Your throat still hurts after a day or two. You do not get better as expected. Where can you learn more? Go to http://kelly-jennie.info/  Enter J454 in the search box to learn more about \"Upper GI Endoscopy: What to Expect at Home. \"  Current as of: September 8, 2021               Content Version: 13.2  © 2006-2022 [email or web address]. Care instructions adapted under license by [employer] (which disclaims liability or warranty for this information). If you have questions about a medical condition or this instruction, always ask your healthcare professional. [place] [address] any warranty or liability for your use of this information. Colonoscopy: What to Expect at [address] [place] [address]  After a colonoscopy, you'll stay at the clinic until you wake up. Then you can go home. But you'll need to arrange for a ride. Your doctor will tell you when you can eat and do your other usual activities. Your doctor will talk to you about when you'll need your next colonoscopy. Your doctor can help you decide how often you need to be checked. This will depend on the results of your test and your risk for colorectal cancer. After the test, you may be bloated or have gas pains. You may need to pass gas. If a biopsy was done or a polyp was removed, you may have streaks of blood in your stool (feces) for a few days. Problems such as heavy rectal bleeding may not occur until several weeks after the test. This isn't common. But it can happen after polyps are removed. This care sheet gives you a general idea about how long it will take for you to recover. But each person recovers at a different pace. Follow the steps below to get better as quickly as possible. How can you care for yourself at home? Activity    Rest when you feel tired. You can do your normal activities when it feels okay to do so. Diet    Follow your doctor's directions for eating. Unless your doctor has told you not to, drink plenty of fluids. This helps to replace the fluids that were lost during the colon prep. Do not drink alcohol. Medicines    Your doctor will tell you if and when you can restart your medicines. You will also be given instructions about taking any new medicines. If you take aspirin or some other blood thinner, ask your doctor if and when to start taking it again. Make sure that you understand exactly what your doctor wants you to do. If polyps were removed or a biopsy was done during the test, your doctor may tell you not to take aspirin or other anti-inflammatory medicines for a few days. These include ibuprofen (Advil, Motrin) and naproxen (Aleve). Other instructions    For your safety, do not drive or operate machinery until the medicine wears off and you can think clearly. Your doctor may tell you not to drive or operate machinery until the day after your test.     Do not sign legal documents or make major decisions until the medicine wears off and you can think clearly. The anesthesia can make it hard for you to fully understand what you are agreeing to. Follow-up care is a key part of your treatment and safety. Be sure to make and go to all appointments, and call your doctor if you are having problems. It's also a good idea to know your test results and keep a list of the medicines you take. When should you call for help? Call 911 anytime you think you may need emergency care. For example, call if:    You passed out (lost consciousness). You pass maroon or bloody stools. You have trouble breathing. Call your doctor now or seek immediate medical care if:    You have pain that does not get better after you take pain medicine. You are sick to your stomach or cannot drink fluids. You have new or worse belly pain. You have blood in your stools. You have a fever. You cannot pass stools or gas. Watch closely for changes in your health, and be sure to contact your doctor if you have any problems. Where can you learn more? Go to http://www.gray.com/  Enter E264 in the search box to learn more about \"Colonoscopy: What to Expect at Home. \"  Current as of: September 8, 2021               Content Version: 13.2  © 2006-2022 [employer]. Care instructions adapted under license by [employer] (which disclaims liability or warranty for this information). If you have questions about a medical condition or this instruction, always ask your healthcare professional. [place] [address] any warranty or liability for your use of this information. DISCHARGE SUMMARY from Nurse     POST-PROCEDURE INSTRUCTIONS:    Call your Physician if you:  Observe any excess bleeding. Develop a temperature over 100.5o F. Experience abdominal, shoulder or chest pain. Notice any signs of decreased circulation or nerve impairment to an extremity such as a change in color, persistent numbness, tingling, coldness or increase in pain. Vomit blood or you have nausea and vomiting lasting longer than 4 hours. Are unable to take medications. Are unable to urinate within 8 hours after discharge following general anesthesia or intravenous sedation. For the next 24 hours after receiving general anesthesia or intravenous sedation, or while taking prescription Narcotics, limit your activities:  Do NOT drive a motor vehicle, operate hazard machinery or power tools, or perform tasks that require coordination. The medication you received during your procedure may have some effect on your mental awareness. Do NOT make important personal or business decisions. The medication you received during your procedure may have some effect on your mental awareness. Do NOT drink alcoholic beverages.   These drinks do not mix well with the medications that have been given to you. Upon discharge from the hospital, you must be accompanied by a responsible adult. Resume your diet as directed by your physician. Resume medications as your physician has prescribed. Please give a list of your current medications to your Primary Care Provider. Please update this list whenever your medications are discontinued, doses are changed, or new medications (including over-the-counter products) are added. Please carry medication information at all times in case of emergency situations. These are general instructions for a healthy lifestyle:    No smoking/ No tobacco products/ Avoid exposure to second hand smoke. Surgeon General's Warning:  Quitting smoking now greatly reduces serious risk to your health. Obesity, smoking, and a sedentary lifestyle greatly increase your risk for illness. A healthy diet, regular physical exercise & weight monitoring are important for maintaining a healthy lifestyle  You may be retaining fluid if you have a history of heart failure or if you experience any of the following symptoms:  Weight gain of 3 pounds or more overnight or 5 pounds in a week, increased swelling in our hands or feet or shortness of breath while lying flat in bed. Please call your doctor as soon as you notice any of these symptoms; do not wait until your next office visit. Recognize signs and symptoms of STROKE:  F  -  Face looks uneven  A  -  Arms unable to move or move unevenly  S  -  Speech slurred or non-existent  T  -  Time to call 911 - as soon as signs and symptoms begin - DO NOT go back to bed or wait to see If you get better - TIME IS BRAIN. Colorectal Screening  Colorectal cancer almost always develops from precancerous polyps (abnormal growths) in the colon or rectum. Screening tests can find precancerous polyps, so that they can be removed before they turn into cancer.  Screening tests can also find colorectal cancer early, when treatment works best.  Speak with your physician about when you should begin screening and how often you should be tested. "[employer]"hart Activation    Thank you for requesting access to C[employer]-Protect. Please follow the instructions below to securely access and download your online medical record. Cuff-Protect allows you to send messages to your doctor, view your test results, renew your prescriptions, schedule appointments, and more. How Do I Sign Up? In your internet browser, go to [email or web address]. Click on the First Time User? Click Here link in the Sign In box. You will see the New Member Sign Up page. Enter your LonoCloudt Access Code exactly as it appears below. You will not need to use this code after youve completed the sign-up process. If you do not sign up before the expiration date, you must request a new code. LonoCloudt Access Code: Activation code not generated  Current Cuff-Protect Status: Active (This is the date your Cuff-Protect access code will )    Enter the last four digits of your Social Security Number (xxxx) and Date of Birth (mm/dd/yyyy) as indicated and click Submit. You will be taken to the next sign-up page. Create a LonoCloudt ID. This will be your Cuff-Protect login ID and cannot be changed, so think of one that is secure and easy to remember. Create a Cuff-Protect password. You can change your password at any time. Enter your Password Reset Question and Answer. This can be used at a later time if you forget your password. Enter your e-mail address. You will receive e-mail notification when new information is available in [address] Click Sign Up. You can now view and download portions of your medical record. Click the [place] link to download a portable copy of your medical information. Additional Information    If you have questions, please call [phone]. Remember, Cuff-Protect is NOT to be used for urgent needs. For medical emergencies, dial 911.     Educational references and/or instructions provided during this visit included:    See Attached      APPOINTMENTS:    Per MD Instruction    Discharge information has been reviewed with the patient. The patient verbalized understanding. Colon Polyps: Care Instructions  Your Care Instructions     Colon polyps are growths in the colon or the rectum. The cause of most colon polyps is not known, and most people who get them do not have any problems. But a certain kind can turn into cancer. For this reason, regular testing for colon polyps is important for people as they get older. It is also important for anyone who has an increased risk for colon cancer. Polyps are usually found through routine colon cancer screening tests. Although most colon polyps are not cancerous, they are usually removed and then tested for cancer. Screening for colon cancer saves lives because the cancer can usually be cured if it is caught early. If you have a polyp that is the type that can turn into cancer, you may need more tests to examine your entire colon. The doctor will remove any other polyps that he or she finds, and you will be tested more often. Follow-up care is a key part of your treatment and safety. Be sure to make and go to all appointments, and call your doctor if you are having problems. It's also a good idea to know your test results and keep a list of the medicines you take. How can you care for yourself at home? Regular exams to look for colon polyps are the best way to prevent polyps from turning into colon cancer. These can include stool tests, sigmoidoscopy, colonoscopy, and CT colonography. Talk with your doctor about a testing schedule that is right for you. To prevent polyps  There is no home treatment that can prevent colon polyps. But these steps may help lower your risk for cancer. Stay active. Being active can help you get to and stay at a healthy weight. Try to exercise on most days of the week.  Walking is a good choice. Eat well. Choose a variety of vegetables, fruits, legumes (such as peas and beans), fish, poultry, and whole grains. Do not smoke. If you need help quitting, talk to your doctor about stop-smoking programs and medicines. These can increase your chances of quitting for good. If you drink alcohol, limit how much you drink. Limit alcohol to 2 drinks a day for men and 1 drink a day for women. When should you call for help? Call your doctor now or seek immediate medical care if:    You have severe belly pain. Your stools are maroon or very bloody. Watch closely for changes in your health, and be sure to contact your doctor if:    You have a fever. You have nausea or vomiting. You have a change in bowel habits (new constipation or diarrhea). Your symptoms get worse or are not improving as expected. Where can you learn more? Go to http://www.amado.com/  Enter C571 in the search box to learn more about \"Colon Polyps: Care Instructions. \"  Current as of: September 8, 2021               Content Version: 13.2  © 2318-3530 [employer]. Care instructions adapted under license by [employer] (which disclaims liability or warranty for this information). If you have questions about a medical condition or this instruction, always ask your healthcare professional. [address] any warranty or liability for your use of this information. Esophageal Dilation: What to Expect at [address] had an esophageal dilation. This procedure can open up narrow areas of the esophagus. After the procedure, you will stay at the hospital or surgery center for 1 to 2 hours. This will allow the medicine to wear off. You will be able to go home after your doctor or nurse checks to make sure that you're not having any problems. This care sheet gives you a general idea about how long it will take for you to recover.  But each person recovers at a different pace. Follow the steps below to get better as quickly as possible. How can you care for yourself at home? Activity    Rest as much as you need to after you go home. You should be able to go back to your usual activities the day after the procedure. Diet    Follow your doctor's directions for eating after the procedure. Drink plenty of fluids (unless your doctor has told you not to). Medicines    Your doctor will tell you if and when you can restart your medicines. He or she will also give you instructions about taking any new medicines. If you take aspirin or some other blood thinner, ask your doctor if and when to start taking it again. Make sure that you understand exactly what your doctor wants you to do. If you have a sore throat the day after the procedure, use an over-the-counter spray to numb your throat. Sucking on throat lozenges and gargling with warm salt water may also help relieve your symptoms. Follow-up care is a key part of your treatment and safety. Be sure to make and go to all appointments, and call your doctor if you are having problems. It's also a good idea to know your test results and keep a list of the medicines you take. When should you call for help? Call 911 anytime you think you may need emergency care. For example, call if:    You passed out (lost consciousness). You have trouble breathing. Your stools are maroon or very bloody   Call your doctor now or seek immediate medical care if:    You have new or worse belly pain. You have a fever. You have new or more blood in your stools. You are sick to your stomach and cannot drink fluids. You cannot pass stools or gas. You have pain that does not get better after you take pain medicine. Watch closely for changes in your health, and be sure to contact your doctor if:    Your throat still hurts after a day or two. You do not get better as expected. Where can you learn more? Go to http://www.gray.com/  Enter J014 in the search box to learn more about \"Esophageal Dilation: What to Expect at Home. \"  Current as of: September 8, 2021               Content Version: 13.2  © 6084-3564 [employer]. Care instructions adapted under license by [employer] (which disclaims liability or warranty for this information). If you have questions about a medical condition or this instruction, always ask your healthcare professional. [place] [address] any warranty or liability for your use of this information.

## 2022-08-23 ENCOUNTER — HOSPITAL ENCOUNTER (INPATIENT)
Age: 62
LOS: 2 days | Discharge: LEFT BEFORE DISCHARGE | DRG: 246 | End: 2022-08-25
Attending: STUDENT IN AN ORGANIZED HEALTH CARE EDUCATION/TRAINING PROGRAM | Admitting: INTERNAL MEDICINE
Payer: MEDICARE

## 2022-08-23 ENCOUNTER — APPOINTMENT (OUTPATIENT)
Dept: GENERAL RADIOLOGY | Age: 62
DRG: 246 | End: 2022-08-23
Attending: EMERGENCY MEDICINE
Payer: MEDICARE

## 2022-08-23 DIAGNOSIS — E78.5 DYSLIPIDEMIA, GOAL LDL BELOW 70: Chronic | ICD-10-CM

## 2022-08-23 DIAGNOSIS — R07.9 ACUTE CHEST PAIN: Primary | ICD-10-CM

## 2022-08-23 DIAGNOSIS — I21.4 NSTEMI (NON-ST ELEVATED MYOCARDIAL INFARCTION) (HCC): ICD-10-CM

## 2022-08-23 DIAGNOSIS — Z95.5 S/P CORONARY ARTERY STENT PLACEMENT: Chronic | ICD-10-CM

## 2022-08-23 DIAGNOSIS — E11.9 TYPE 2 DIABETES MELLITUS WITHOUT COMPLICATION, WITHOUT LONG-TERM CURRENT USE OF INSULIN (HCC): ICD-10-CM

## 2022-08-23 LAB
ALBUMIN SERPL-MCNC: 4 G/DL (ref 3.4–5)
ALBUMIN/GLOB SERPL: 1.5 {RATIO} (ref 0.8–1.7)
ALP SERPL-CCNC: 62 U/L (ref 45–117)
ALT SERPL-CCNC: 60 U/L (ref 13–56)
ANION GAP SERPL CALC-SCNC: 8 MMOL/L (ref 3–18)
AST SERPL-CCNC: 41 U/L (ref 10–38)
BASOPHILS # BLD: 0.1 K/UL (ref 0–0.1)
BASOPHILS NFR BLD: 1 % (ref 0–2)
BILIRUB SERPL-MCNC: 0.7 MG/DL (ref 0.2–1)
BUN SERPL-MCNC: 9 MG/DL (ref 7–18)
BUN/CREAT SERPL: 9 (ref 12–20)
CALCIUM SERPL-MCNC: 9.8 MG/DL (ref 8.5–10.1)
CHLORIDE SERPL-SCNC: 107 MMOL/L (ref 100–111)
CO2 SERPL-SCNC: 24 MMOL/L (ref 21–32)
CREAT SERPL-MCNC: 1.02 MG/DL (ref 0.6–1.3)
D DIMER PPP FEU-MCNC: 0.39 UG/ML(FEU)
DIFFERENTIAL METHOD BLD: ABNORMAL
EOSINOPHIL # BLD: 0.9 K/UL (ref 0–0.4)
EOSINOPHIL NFR BLD: 8 % (ref 0–5)
ERYTHROCYTE [DISTWIDTH] IN BLOOD BY AUTOMATED COUNT: 12.9 % (ref 11.6–14.5)
EST. AVERAGE GLUCOSE BLD GHB EST-MCNC: 143 MG/DL
GLOBULIN SER CALC-MCNC: 2.7 G/DL (ref 2–4)
GLUCOSE BLD STRIP.AUTO-MCNC: 124 MG/DL (ref 70–110)
GLUCOSE SERPL-MCNC: 171 MG/DL (ref 74–99)
HBA1C MFR BLD: 6.6 % (ref 4.2–5.6)
HCT VFR BLD AUTO: 38.5 % (ref 35–45)
HGB BLD-MCNC: 13 G/DL (ref 12–16)
IMM GRANULOCYTES # BLD AUTO: 0 K/UL (ref 0–0.04)
IMM GRANULOCYTES NFR BLD AUTO: 0 % (ref 0–0.5)
LYMPHOCYTES # BLD: 3.7 K/UL (ref 0.9–3.6)
LYMPHOCYTES NFR BLD: 37 % (ref 21–52)
MCH RBC QN AUTO: 33.2 PG (ref 24–34)
MCHC RBC AUTO-ENTMCNC: 33.8 G/DL (ref 31–37)
MCV RBC AUTO: 98.2 FL (ref 78–100)
MONOCYTES # BLD: 0.7 K/UL (ref 0.05–1.2)
MONOCYTES NFR BLD: 7 % (ref 3–10)
NEUTS SEG # BLD: 4.8 K/UL (ref 1.8–8)
NEUTS SEG NFR BLD: 47 % (ref 40–73)
NRBC # BLD: 0 K/UL (ref 0–0.01)
NRBC BLD-RTO: 0 PER 100 WBC
PLATELET # BLD AUTO: 315 K/UL (ref 135–420)
PMV BLD AUTO: 9.8 FL (ref 9.2–11.8)
POTASSIUM SERPL-SCNC: 3.7 MMOL/L (ref 3.5–5.5)
PROT SERPL-MCNC: 6.7 G/DL (ref 6.4–8.2)
RBC # BLD AUTO: 3.92 M/UL (ref 4.2–5.3)
SODIUM SERPL-SCNC: 139 MMOL/L (ref 136–145)
TROPONIN-HIGH SENSITIVITY: 15 NG/L (ref 0–54)
TROPONIN-HIGH SENSITIVITY: 7 NG/L (ref 0–54)
WBC # BLD AUTO: 10.2 K/UL (ref 4.6–13.2)

## 2022-08-23 PROCEDURE — 99285 EMERGENCY DEPT VISIT HI MDM: CPT

## 2022-08-23 PROCEDURE — 94761 N-INVAS EAR/PLS OXIMETRY MLT: CPT

## 2022-08-23 PROCEDURE — 2709999900 HC NON-CHARGEABLE SUPPLY

## 2022-08-23 PROCEDURE — 74011250637 HC RX REV CODE- 250/637: Performed by: STUDENT IN AN ORGANIZED HEALTH CARE EDUCATION/TRAINING PROGRAM

## 2022-08-23 PROCEDURE — 85379 FIBRIN DEGRADATION QUANT: CPT

## 2022-08-23 PROCEDURE — 80053 COMPREHEN METABOLIC PANEL: CPT

## 2022-08-23 PROCEDURE — APPSS30 APP SPLIT SHARED TIME 16-30 MINUTES: Performed by: NURSE PRACTITIONER

## 2022-08-23 PROCEDURE — 65270000046 HC RM TELEMETRY

## 2022-08-23 PROCEDURE — 93005 ELECTROCARDIOGRAM TRACING: CPT

## 2022-08-23 PROCEDURE — 83036 HEMOGLOBIN GLYCOSYLATED A1C: CPT

## 2022-08-23 PROCEDURE — 84484 ASSAY OF TROPONIN QUANT: CPT

## 2022-08-23 PROCEDURE — 85025 COMPLETE CBC W/AUTO DIFF WBC: CPT

## 2022-08-23 PROCEDURE — 74011000250 HC RX REV CODE- 250: Performed by: STUDENT IN AN ORGANIZED HEALTH CARE EDUCATION/TRAINING PROGRAM

## 2022-08-23 PROCEDURE — 99221 1ST HOSP IP/OBS SF/LOW 40: CPT | Performed by: INTERNAL MEDICINE

## 2022-08-23 PROCEDURE — 71046 X-RAY EXAM CHEST 2 VIEWS: CPT

## 2022-08-23 PROCEDURE — 82962 GLUCOSE BLOOD TEST: CPT

## 2022-08-23 PROCEDURE — 74011000250 HC RX REV CODE- 250: Performed by: NURSE PRACTITIONER

## 2022-08-23 RX ORDER — ONDANSETRON 2 MG/ML
4 INJECTION INTRAMUSCULAR; INTRAVENOUS
Status: DISCONTINUED | OUTPATIENT
Start: 2022-08-23 | End: 2022-08-25 | Stop reason: HOSPADM

## 2022-08-23 RX ORDER — SODIUM CHLORIDE 0.9 % (FLUSH) 0.9 %
5-40 SYRINGE (ML) INJECTION AS NEEDED
Status: DISCONTINUED | OUTPATIENT
Start: 2022-08-23 | End: 2022-08-25 | Stop reason: HOSPADM

## 2022-08-23 RX ORDER — DEXTROSE MONOHYDRATE 100 MG/ML
0-250 INJECTION, SOLUTION INTRAVENOUS AS NEEDED
Status: DISCONTINUED | OUTPATIENT
Start: 2022-08-23 | End: 2022-08-25 | Stop reason: HOSPADM

## 2022-08-23 RX ORDER — POLYETHYLENE GLYCOL 3350 17 G/17G
17 POWDER, FOR SOLUTION ORAL DAILY PRN
Status: DISCONTINUED | OUTPATIENT
Start: 2022-08-23 | End: 2022-08-25 | Stop reason: HOSPADM

## 2022-08-23 RX ORDER — ASPIRIN 325 MG
325 TABLET ORAL
Status: COMPLETED | OUTPATIENT
Start: 2022-08-23 | End: 2022-08-23

## 2022-08-23 RX ORDER — NITROGLYCERIN 0.4 MG/1
0.4 TABLET SUBLINGUAL
Status: COMPLETED | OUTPATIENT
Start: 2022-08-23 | End: 2022-08-23

## 2022-08-23 RX ORDER — INSULIN LISPRO 100 [IU]/ML
INJECTION, SOLUTION INTRAVENOUS; SUBCUTANEOUS
Status: DISCONTINUED | OUTPATIENT
Start: 2022-08-23 | End: 2022-08-25 | Stop reason: HOSPADM

## 2022-08-23 RX ORDER — ACETAMINOPHEN 325 MG/1
650 TABLET ORAL
Status: DISCONTINUED | OUTPATIENT
Start: 2022-08-23 | End: 2022-08-25 | Stop reason: HOSPADM

## 2022-08-23 RX ORDER — ACETAMINOPHEN 650 MG/1
650 SUPPOSITORY RECTAL
Status: DISCONTINUED | OUTPATIENT
Start: 2022-08-23 | End: 2022-08-25 | Stop reason: HOSPADM

## 2022-08-23 RX ORDER — FAMOTIDINE 20 MG/1
20 TABLET, FILM COATED ORAL DAILY
Status: DISCONTINUED | OUTPATIENT
Start: 2022-08-24 | End: 2022-08-25 | Stop reason: HOSPADM

## 2022-08-23 RX ORDER — SODIUM CHLORIDE 0.9 % (FLUSH) 0.9 %
5-40 SYRINGE (ML) INJECTION EVERY 8 HOURS
Status: DISCONTINUED | OUTPATIENT
Start: 2022-08-23 | End: 2022-08-25 | Stop reason: HOSPADM

## 2022-08-23 RX ORDER — FLUTICASONE PROPIONATE 220 UG/1
AEROSOL, METERED RESPIRATORY (INHALATION)
COMMUNITY
Start: 2022-08-05

## 2022-08-23 RX ORDER — MAGNESIUM SULFATE 100 %
4 CRYSTALS MISCELLANEOUS AS NEEDED
Status: DISCONTINUED | OUTPATIENT
Start: 2022-08-23 | End: 2022-08-25 | Stop reason: HOSPADM

## 2022-08-23 RX ORDER — ONDANSETRON 8 MG/1
4 TABLET, ORALLY DISINTEGRATING ORAL
Status: DISCONTINUED | OUTPATIENT
Start: 2022-08-23 | End: 2022-08-25 | Stop reason: HOSPADM

## 2022-08-23 RX ORDER — ENOXAPARIN SODIUM 100 MG/ML
40 INJECTION SUBCUTANEOUS DAILY
Status: DISCONTINUED | OUTPATIENT
Start: 2022-08-24 | End: 2022-08-24

## 2022-08-23 RX ADMIN — SODIUM CHLORIDE, PRESERVATIVE FREE 10 ML: 5 INJECTION INTRAVENOUS at 22:00

## 2022-08-23 RX ADMIN — Medication 0.4 MG: at 18:45

## 2022-08-23 RX ADMIN — ASPIRIN 325 MG ORAL TABLET 325 MG: 325 PILL ORAL at 18:45

## 2022-08-23 RX ADMIN — ALUMINUM HYDROXIDE, MAGNESIUM HYDROXIDE, AND SIMETHICONE 40 ML: 200; 200; 20 SUSPENSION ORAL at 18:45

## 2022-08-23 NOTE — Clinical Note
Right radial artery. Accessed successfully. Radial access needle used. Using ultrasound guidance. Number of attempts =  4.

## 2022-08-23 NOTE — Clinical Note
TRANSFER - IN REPORT:     Verbal report received from: Nicolette Alamo RN. Report consisted of patient's Situation, Background, Assessment and   Recommendations(SBAR). Opportunity for questions and clarification was provided. Assessment completed upon patient's arrival to unit and care assumed. Patient transported with a Cardiac Cath Tech / Patient Care Tech.

## 2022-08-23 NOTE — Clinical Note
Contrast Dose Calculator:   Patient's age: 63.   Patient's sex: Female. Patient weight (kg) = 77. Creatinine level (mg/dL) = 0.96. Creatinine clearance (mL/min): 76.   Contrast concentration (mg/mL) = 300. MACD = 300 mL. Max Contrast dose per Creatinine Cl calculator = 171 mL.

## 2022-08-23 NOTE — Clinical Note
Addended by: JERONIMO MORRISSEY on: 4/27/2018 01:11 PM     Modules accepted: Orders     Multiple views of the right coronary artery obtained using hand injection.

## 2022-08-23 NOTE — Clinical Note
TRANSFER - OUT REPORT:     Verbal report given to: Velasquez Rodriguez RN. Report consisted of patient's Situation, Background, Assessment and   Recommendations(SBAR). Opportunity for questions and clarification was provided. Patient transported with a Registered Nurse. Patient transported to: holding area.

## 2022-08-23 NOTE — ED PROVIDER NOTES
EMERGENCY DEPARTMENT HISTORY AND PHYSICAL EXAM    I have evaluated the patient at 6:13 PM      Date: 8/23/2022  Patient Name: James Orellana    History of Presenting Illness     Chief Complaint   Patient presents with    Chest Pain         History Provided By: Patient  Location/Duration/Severity/Modifying factors   53-year-old female with history of CAD with prior stenting, diabetes, GERD, hypertension arthritis presenting to the emergency department for evaluation of chest pain that started approximately 45 minutes prior to arrival.  Patient denies any strenuous activity at the onset of the pain. Describes it as an aching on the right side of her chest that goes into her right shoulder. Patient reports having similar pain in the past when she was diagnosed with her NSTEMI and diagnosed with LAD occlusion. She took 4 nitro at home with some relief. Did not take any aspirin. Denies any shortness of breath, cough, fevers or chills abdominal pain, NVD. PCP: López Garcia MD    Current Outpatient Medications   Medication Sig Dispense Refill    hydroCHLOROthiazide (HYDRODIURIL) 25 mg tablet Take 25 mg by mouth in the morning. Indications: high blood pressure      glipiZIDE (GLUCOTROL) 10 mg tablet Take 10 mg by mouth in the morning.  rosuvastatin (CRESTOR) 40 mg tablet Take 40 mg by mouth nightly.  adalimumab (Humira Pen) 40 mg/0.8 mL injection pen by SubCUTAneous route every seven (7) days.  dulaglutide (TRULICITY) 1.44 HQ/4.0 mL sub-q pen 0.75 mg by SubCUTAneous route every seven (7) days.  ezetimibe (ZETIA) 10 mg tablet Take 10 mg by mouth daily.  doxycycline (MONODOX) 100 mg capsule Take 100 mg by mouth two (2) times a day.  metFORMIN (GLUCOPHAGE) 500 mg tablet Take 500 mg by mouth daily (with breakfast).  lidocaine (LIDODERM) 5 % by TransDERmal route every twenty-four (24) hours.  Apply patch to the affected area for 12 hours a day and remove for 12 hours a day.      nitroglycerin (NITROSTAT) 0.4 mg SL tablet 1 Tab by SubLINGual route every five (5) minutes as needed for Chest Pain for up to 3 doses. Up to 3 doses. 25 Tab 0    atorvastatin (LIPITOR) 80 mg tablet Take 1 Tab by mouth nightly. 30 Tab 0    losartan (COZAAR) 50 mg tablet Take 1 Tab by mouth two (2) times a day. 60 Tab 0    clopidogreL (PLAVIX) 75 mg tab Take 1 Tab by mouth daily. (Patient not taking: Reported on 8/2/2022) 30 Tab 0    acetaminophen (TYLENOL) 500 mg tablet Take 1,000 mg by mouth every eight (8) hours as needed for Pain.  amLODIPine (NORVASC) 10 mg tablet Take 10 mg by mouth daily. (Patient not taking: Reported on 8/2/2022)      hydroxychloroquine (PLAQUENIL) 200 mg tablet Take 200 mg by mouth two (2) times a day. Rheumatoid arthritis -stop a day before for surgery per Rheuamtology      venlafaxine (EFFEXOR) 75 mg tablet Take 75 mg by mouth daily.  omeprazole (PRILOSEC) 40 mg capsule Take 40 mg by mouth daily.  diphenhydrAMINE (BENADRYL) 25 mg capsule Take 25 mg by mouth every six (6) hours as needed for Itching.  metoprolol succinate (TOPROL-XL) 25 mg XL tablet Take 1 Tab by mouth daily. (Patient taking differently: Take 50 mg by mouth in the morning.) 30 Tab 1    aspirin delayed-release 81 mg tablet Take 1 Tab by mouth daily. (Patient not taking: Reported on 8/2/2022) 30 Tab 11    gabapentin (NEURONTIN) 600 mg tablet Take 600 mg by mouth two (2) times a day.          Past History     Past Medical History:  Past Medical History:   Diagnosis Date    Arthritis     CAD (coronary artery disease) 2015    Diabetes ([facility] [address] 2015    Dyspnea on exertion     Essential hypertension, benign     GERD (gastroesophageal reflux disease)     H/O emotional problems     History of fall 07/01/14    Twisted right knee    Hypertension 1995    Osteoarthritis of right knee     Osteoarthritis of right knee 9/22/2016    Other and unspecified hyperlipidemia     Pre-operative cardiovascular examination     Rheumatoid arthritis [facility])        Past Surgical History:  Past Surgical History:   Procedure Laterality Date    COLONOSCOPY N/A 8/3/2022    COLONOSCOPY with polypectomies performed by Cedric Cullen MD at [facility] END[facility]COPY    HX APPENDECTOMY       [address] HX COLONOSCOPY      pt stated several times    HX GYN      endometrial ablation    HX HEMORRHOIDECTOMY      HX HYSTEROSCOPY WITH ENDOMETRIAL ABLATION      HX KNEE ARTHROSCOPY Right     HX KNEE REPLACEMENT Bilateral     HX LAP CHOLECYSTECTOMY      HX ORTHOPAEDIC      left foot spur removal    HX ROTATOR CUFF REPAIR Right 2020    DC CARDIAC SURG PROCEDURE UNLIST  2015    2 stents and 2018 - total of 3 stents per patient       Family History:  Family History   Problem Relation Age of Onset    Heart Disease Other        Social History:  Social History     Tobacco Use    Smoking status: Every Day     Packs/day: 0.50     Years: 35.00     Pack years: 17.50     Types: Cigarettes    Smokeless tobacco: Never    Tobacco comments:     10 per day per patient     Vaping Use    Vaping Use: Former   Substance Use Topics    Alcohol use: Yes     Alcohol/week: 0.0 standard drinks     Comment: rare 1-2 yr    Drug use: Not Currently     Types: Marijuana     Comment: occ       Allergies: Allergies   Allergen Reactions    Oxycodone Itching    Tramadol Hives and Itching         Review of Systems     Review of Systems   Constitutional:  Negative for activity change, chills, diaphoresis, fatigue and fever. Eyes:  Negative for photophobia, pain and visual disturbance. Respiratory:  Negative for cough, chest tightness, shortness of breath, wheezing and stridor. Cardiovascular:  Positive for chest pain. Negative for palpitations. Gastrointestinal:  Negative for abdominal distention, abdominal pain, constipation, diarrhea, nausea and vomiting. Genitourinary:  Negative for difficulty urinating, dysuria and hematuria. Musculoskeletal:  Negative for back pain, joint swelling and myalgias. Skin:  Negative for rash and wound. Neurological:  Negative for dizziness, weakness and headaches. Psychiatric/Behavioral:  Negative for agitation. The patient is not nervous/anxious. Physical Exam   Visit Vitals  BP (!) 159/63   Pulse (!) 59   Temp 98.1 °F (36.7 °C)   Resp 17   SpO2 99%         Physical Exam  Constitutional:       General: She is not in acute distress. Appearance: She is not toxic-appearing. Comments: Patient appears in discomfort lying in the left lateral decubitus position holding her chest.   HENT:      Head: Normocephalic and atraumatic. Mouth/Throat:      Mouth: Mucous membranes are moist.   Eyes:      Extraocular Movements: Extraocular movements intact. Pupils: Pupils are equal, round, and reactive to light. Cardiovascular:      Rate and Rhythm: Normal rate and regular rhythm. Heart sounds: Normal heart sounds. No murmur heard. No friction rub. No gallop. Pulmonary:      Effort: Pulmonary effort is normal.      Breath sounds: Normal breath sounds. Chest:      Chest wall: No mass, deformity or tenderness. Abdominal:      General: There is no distension. Palpations: Abdomen is soft. There is no mass. Tenderness: There is no abdominal tenderness. There is no guarding. Hernia: No hernia is present. Musculoskeletal:         General: No swelling, tenderness or deformity. Cervical back: Normal range of motion and neck supple. Skin:     General: Skin is warm and dry. Findings: No rash. Neurological:      General: No focal deficit present. Mental Status: She is alert and oriented to person, place, and time.    Psychiatric:         Mood and Affect: Mood normal.         Diagnostic Study Results     Labs -  Recent Results (from the past 12 hour(s))   CBC WITH AUTOMATED DIFF    Collection Time: 08/23/22  5:20 PM   Result Value Ref Range    WBC 10.2 4.6 - 13.2 K/uL    RBC 3.92 (L) 4.20 - 5.30 M/uL    HGB 13.0 12.0 - 16.0 g/dL    HCT 38.5 35.0 - 45.0 %    MCV 98.2 78.0 - 100.0 FL    MCH 33.2 24.0 - 34.0 PG    MCHC 33.8 31.0 - 37.0 g/dL    RDW 12.9 11.6 - 14.5 %    PLATELET 042 440 - 780 K/uL    MPV 9.8 9.2 - 11.8 FL    NRBC 0.0 0  WBC    ABSOLUTE NRBC 0.00 0.00 - 0.01 K/uL    NEUTROPHILS 47 40 - 73 %    LYMPHOCYTES 37 21 - 52 %    MONOCYTES 7 3 - 10 %    EOSINOPHILS 8 (H) 0 - 5 %    BASOPHILS 1 0 - 2 %    IMMATURE GRANULOCYTES 0 0.0 - 0.5 %    ABS. NEUTROPHILS 4.8 1.8 - 8.0 K/UL    ABS. LYMPHOCYTES 3.7 (H) 0.9 - 3.6 K/UL    ABS. MONOCYTES 0.7 0.05 - 1.2 K/UL    ABS. EOSINOPHILS 0.9 (H) 0.0 - 0.4 K/UL    ABS. BASOPHILS 0.1 0.0 - 0.1 K/UL    ABS. IMM. GRANS. 0.0 0.00 - 0.04 K/UL    DF AUTOMATED     METABOLIC PANEL, COMPREHENSIVE    Collection Time: 08/23/22  5:20 PM   Result Value Ref Range    Sodium 139 136 - 145 mmol/L    Potassium 3.7 3.5 - 5.5 mmol/L    Chloride 107 100 - 111 mmol/L    CO2 24 21 - 32 mmol/L    Anion gap 8 3.0 - 18 mmol/L    Glucose 171 (H) 74 - 99 mg/dL    BUN 9 7.0 - 18 MG/DL    Creatinine 1.02 0.6 - 1.3 MG/DL    BUN/Creatinine ratio 9 (L) 12 - 20      GFR est AA >60 >60 ml/min/1.73m2    GFR est non-AA 55 (L) >60 ml/min/1.73m2    Calcium 9.8 8.5 - 10.1 MG/DL    Bilirubin, total 0.7 0.2 - 1.0 MG/DL    ALT (SGPT) 60 (H) 13 - 56 U/L    AST (SGOT) 41 (H) 10 - 38 U/L    Alk.  phosphatase 62 45 - 117 U/L    Protein, total 6.7 6.4 - 8.2 g/dL    Albumin 4.0 3.4 - 5.0 g/dL    Globulin 2.7 2.0 - 4.0 g/dL    A-G Ratio 1.5 0.8 - 1.7     TROPONIN-HIGH SENSITIVITY    Collection Time: 08/23/22  5:20 PM   Result Value Ref Range    Troponin-High Sensitivity 7 0 - 54 ng/L   D DIMER    Collection Time: 08/23/22  5:20 PM   Result Value Ref Range    D DIMER 0.39 <0.46 ug/ml(FEU)   TROPONIN-HIGH SENSITIVITY    Collection Time: 08/23/22  6:50 PM   Result Value Ref Range    Troponin-High Sensitivity 15 0 - 54 ng/L       Radiologic Studies -   XR CHEST PA LAT (Results Pending)         Medical Decision Making   I am the first provider for this patient. I reviewed the vital signs, available nursing notes, past medical history, past surgical history, family history and social history. Vital Signs-Reviewed the patient's vital signs. EKG: Sinus bradycardia 55 bpm.  No STEMI. Nondiagnostic EKG    Records Reviewed: Nursing Notes, Old Medical Records, Previous electrocardiograms, Previous Radiology Studies, and Previous Laboratory Studies (Time of Review: 6:13 PM)    ED Course: Progress Notes, Reevaluation, and Consults:         Provider Notes (Medical Decision Making):   MDM  Number of Diagnoses or Management Options  Acute chest pain  Dyslipidemia, goal LDL below 70 [E78.5 (ICD-10-CM)]  NSTEMI (non-ST elevated myocardial infarction) ([address]  S/P coronary artery stent placement [Z95.5 (ICD-10-CM)]  Type 2 diabetes mellitus without complication, without long-term current use of insulin ([facility] [address] [E11.9 (ICD-10-CM)]  Diagnosis management comments: 61-year-old female presenting to the emergency department for evaluation of chest pain. She appears in discomfort. No acute distress. No diaphoresis. Vital signs are stable. Initial EKG shows no evidence of STEMI. Screening lab work during initial troponin obtained which is grossly unremarkable. Patient will be given aspirin and additional nitroglycerin. We will also attempt GI cocktail. Will repeat EKG. Will discuss with cardiology. 2019:  Patient's chest pain is. Patient did have a rise in her troponin 7-15. Repeat EKG was obtained which shows no evidence of STEMI. Discussed with cardiology. Recommend admission and they will evaluate in the morning. Will discuss case with hospitalist for admission at this time.                  Procedures    Critical Care Time:  The services I provided to this patient were to treat and/or prevent clinically significant deterioration that could result in the failure of one or more body systems and/or organ systems due to ACS. Services included the following:  -reviewing nursing notes and old charts  -vital sign assessments  -direct patient care  -medication orders and management  -interpreting and reviewing diagnostic studies/labs  -re-evaluations  -documentation time    Aggregate critical care time was 35 minutes, which includes only time during which I was engaged in work directly related to the patient's care as described above, whether I was at bedside or elsewhere in the Emergency Department. It did not include time spent performing other reported procedures or the services of residents, students, nurses, or advance practice providers. Zachariah Barry DO    10:19 AM        Diagnosis     Clinical Impression:   1. Acute chest pain        Disposition: admitted, telemetry    Follow-up Information    None          Patient's Medications   Start Taking    No medications on file   Continue Taking    ACETAMINOPHEN (TYLENOL) 500 MG TABLET    Take 1,000 mg by mouth every eight (8) hours as needed for Pain. ADALIMUMAB (HUMIRA PEN) 40 MG/0.8 ML INJECTION PEN    by SubCUTAneous route every seven (7) days. AMLODIPINE (NORVASC) 10 MG TABLET    Take 10 mg by mouth daily. ASPIRIN DELAYED-RELEASE 81 MG TABLET    Take 1 Tab by mouth daily. ATORVASTATIN (LIPITOR) 80 MG TABLET    Take 1 Tab by mouth nightly. CLOPIDOGREL (PLAVIX) 75 MG TAB    Take 1 Tab by mouth daily. DIPHENHYDRAMINE (BENADRYL) 25 MG CAPSULE    Take 25 mg by mouth every six (6) hours as needed for Itching. DOXYCYCLINE (MONODOX) 100 MG CAPSULE    Take 100 mg by mouth two (2) times a day. DULAGLUTIDE (TRULICITY) 6.53 QA/5.4 ML SUB-Q PEN    0.75 mg by SubCUTAneous route every seven (7) days. EZETIMIBE (ZETIA) 10 MG TABLET    Take 10 mg by mouth daily. GABAPENTIN (NEURONTIN) 600 MG TABLET    Take 600 mg by mouth two (2) times a day.     GLIPIZIDE (GLUCOTROL) 10 MG TABLET    Take 10 mg by mouth in the morning. HYDROCHLOROTHIAZIDE (HYDRODIURIL) 25 MG TABLET    Take 25 mg by mouth in the morning. Indications: high blood pressure    HYDROXYCHLOROQUINE (PLAQUENIL) 200 MG TABLET    Take 200 mg by mouth two (2) times a day. Rheumatoid arthritis -stop a day before for surgery per Rheuamtology    LIDOCAINE (LIDODERM) 5 %    by TransDERmal route every twenty-four (24) hours. Apply patch to the affected area for 12 hours a day and remove for 12 hours a day. LOSARTAN (COZAAR) 50 MG TABLET    Take 1 Tab by mouth two (2) times a day. METFORMIN (GLUCOPHAGE) 500 MG TABLET    Take 500 mg by mouth daily (with breakfast). METOPROLOL SUCCINATE (TOPROL-XL) 25 MG XL TABLET    Take 1 Tab by mouth daily. NITROGLYCERIN (NITROSTAT) 0.4 MG SL TABLET    1 Tab by SubLINGual route every five (5) minutes as needed for Chest Pain for up to 3 doses. Up to 3 doses. OMEPRAZOLE (PRILOSEC) 40 MG CAPSULE    Take 40 mg by mouth daily. ROSUVASTATIN (CRESTOR) 40 MG TABLET    Take 40 mg by mouth nightly. VENLAFAXINE (EFFEXOR) 75 MG TABLET    Take 75 mg by mouth daily. These Medications have changed    No medications on file   Stop Taking    No medications on file     Disclaimer: Sections of this note are dictated using utilizing voice recognition software. Minor typographical errors may be present. If questions arise, please do not hesitate to contact me or call our department.

## 2022-08-23 NOTE — Clinical Note
Status[de-identified] INPATIENT [101]   Type of Bed: Telemetry [19]   Cardiac Monitoring Required?: Yes   Inpatient Hospitalization Certified Necessary for the Following Reasons: 3.  Patient receiving treatment that can only be provided in an inpatient setting (further clarification in H&P documentation)   Admitting Diagnosis: Chest pain [472100]   Admitting Physician: Tessie Soto   Attending Physician: Tessie Soto   Estimated Length of Stay: 2 Midnights   Discharge Plan[de-identified] Home with Office Follow-up

## 2022-08-23 NOTE — ED TRIAGE NOTES
64 yo F to ED for CP that started about 45 min pta.  Pt had some relief with at home nitro(took 4) nitro

## 2022-08-23 NOTE — Clinical Note
Right radial artery. Accessed unsuccessfully. Radial access needle used. Using ultrasound guidance. Number of attempts =  3.  Out and holding pressure

## 2022-08-24 ENCOUNTER — APPOINTMENT (OUTPATIENT)
Dept: NON INVASIVE DIAGNOSTICS | Age: 62
DRG: 246 | End: 2022-08-24
Attending: INTERNAL MEDICINE
Payer: MEDICARE

## 2022-08-24 LAB
ACT BLD: 231 SECS (ref 79–138)
ACT BLD: 445 SECS (ref 79–138)
ALBUMIN SERPL-MCNC: 3.5 G/DL (ref 3.4–5)
ALBUMIN/GLOB SERPL: 1.4 {RATIO} (ref 0.8–1.7)
ALP SERPL-CCNC: 56 U/L (ref 45–117)
ALT SERPL-CCNC: 49 U/L (ref 13–56)
ANION GAP SERPL CALC-SCNC: 8 MMOL/L (ref 3–18)
AST SERPL-CCNC: 32 U/L (ref 10–38)
ATRIAL RATE: 50 BPM
ATRIAL RATE: 55 BPM
ATRIAL RATE: 78 BPM
BILIRUB SERPL-MCNC: 0.5 MG/DL (ref 0.2–1)
BUN SERPL-MCNC: 10 MG/DL (ref 7–18)
BUN/CREAT SERPL: 10 (ref 12–20)
CALCIUM SERPL-MCNC: 9.3 MG/DL (ref 8.5–10.1)
CALCULATED P AXIS, ECG09: 47 DEGREES
CALCULATED P AXIS, ECG09: 63 DEGREES
CALCULATED P AXIS, ECG09: 90 DEGREES
CALCULATED R AXIS, ECG10: 16 DEGREES
CALCULATED R AXIS, ECG10: 46 DEGREES
CALCULATED R AXIS, ECG10: 50 DEGREES
CALCULATED T AXIS, ECG11: 45 DEGREES
CALCULATED T AXIS, ECG11: 47 DEGREES
CALCULATED T AXIS, ECG11: 66 DEGREES
CHLORIDE SERPL-SCNC: 109 MMOL/L (ref 100–111)
CHOLEST SERPL-MCNC: 110 MG/DL
CO2 SERPL-SCNC: 26 MMOL/L (ref 21–32)
CREAT SERPL-MCNC: 0.96 MG/DL (ref 0.6–1.3)
DIAGNOSIS, 93000: NORMAL
ECHO AO ASC DIAM: 3.6 CM
ECHO AO ASCENDING AORTA INDEX: 1.94 CM/M2
ECHO AO ROOT DIAM: 3.3 CM
ECHO AO ROOT INDEX: 1.77 CM/M2
ECHO AR MAX VEL PISA: 4.4 M/S
ECHO AV REGURGITANT PHT: 533.2 MILLISECOND
ECHO LA VOL 2C: 57 ML (ref 22–52)
ECHO LA VOL 4C: 52 ML (ref 22–52)
ECHO LA VOLUME AREA LENGTH: 57 ML
ECHO LA VOLUME INDEX A2C: 31 ML/M2 (ref 16–34)
ECHO LA VOLUME INDEX A4C: 28 ML/M2 (ref 16–34)
ECHO LA VOLUME INDEX AREA LENGTH: 31 ML/M2 (ref 16–34)
ECHO LV E' LATERAL VELOCITY: 9 CM/S
ECHO LV E' SEPTAL VELOCITY: 5 CM/S
ECHO LV FRACTIONAL SHORTENING: 34 % (ref 28–44)
ECHO LV INTERNAL DIMENSION DIASTOLE INDEX: 2.69 CM/M2
ECHO LV INTERNAL DIMENSION DIASTOLIC: 5 CM (ref 3.9–5.3)
ECHO LV INTERNAL DIMENSION SYSTOLIC INDEX: 1.77 CM/M2
ECHO LV INTERNAL DIMENSION SYSTOLIC: 3.3 CM
ECHO LV IVSD: 0.9 CM (ref 0.6–0.9)
ECHO LV MASS 2D: 158.2 G (ref 67–162)
ECHO LV MASS INDEX 2D: 85.1 G/M2 (ref 43–95)
ECHO LV POSTERIOR WALL DIASTOLIC: 0.9 CM (ref 0.6–0.9)
ECHO LV RELATIVE WALL THICKNESS RATIO: 0.36
ECHO LVOT AREA: 2.8 CM2
ECHO LVOT DIAM: 1.9 CM
ECHO LVOT MEAN GRADIENT: 6 MMHG
ECHO LVOT PEAK GRADIENT: 10 MMHG
ECHO LVOT PEAK VELOCITY: 1.6 M/S
ECHO LVOT STROKE VOLUME INDEX: 57.3 ML/M2
ECHO LVOT SV: 106.6 ML
ECHO LVOT VTI: 37.6 CM
ECHO MV A VELOCITY: 0.9 M/S
ECHO MV E DECELERATION TIME (DT): 223.2 MS
ECHO MV E VELOCITY: 1.01 M/S
ECHO MV E/A RATIO: 1.12
ECHO MV E/E' LATERAL: 11.22
ECHO MV E/E' RATIO (AVERAGED): 15.71
ECHO MV E/E' SEPTAL: 20.2
ECHO RV FREE WALL PEAK S': 11 CM/S
ECHO RV TAPSE: 2.9 CM (ref 1.7–?)
ECHO TV REGURGITANT MAX VELOCITY: 2.31 M/S
ECHO TV REGURGITANT PEAK GRADIENT: 21 MMHG
ERYTHROCYTE [DISTWIDTH] IN BLOOD BY AUTOMATED COUNT: 12.6 % (ref 11.6–14.5)
GLOBULIN SER CALC-MCNC: 2.5 G/DL (ref 2–4)
GLUCOSE BLD STRIP.AUTO-MCNC: 111 MG/DL (ref 70–110)
GLUCOSE BLD STRIP.AUTO-MCNC: 113 MG/DL (ref 70–110)
GLUCOSE BLD STRIP.AUTO-MCNC: 141 MG/DL (ref 70–110)
GLUCOSE SERPL-MCNC: 146 MG/DL (ref 74–99)
HCT VFR BLD AUTO: 36.1 % (ref 35–45)
HDLC SERPL-MCNC: 56 MG/DL (ref 40–60)
HDLC SERPL: 2 {RATIO} (ref 0–5)
HGB BLD-MCNC: 12.1 G/DL (ref 12–16)
LDLC SERPL CALC-MCNC: 33.8 MG/DL (ref 0–100)
LIPID PROFILE,FLP: NORMAL
MCH RBC QN AUTO: 33 PG (ref 24–34)
MCHC RBC AUTO-ENTMCNC: 33.5 G/DL (ref 31–37)
MCV RBC AUTO: 98.4 FL (ref 78–100)
NRBC # BLD: 0 K/UL (ref 0–0.01)
NRBC BLD-RTO: 0 PER 100 WBC
P-R INTERVAL, ECG05: 174 MS
P-R INTERVAL, ECG05: 198 MS
P-R INTERVAL, ECG05: 198 MS
PLATELET # BLD AUTO: 285 K/UL (ref 135–420)
PMV BLD AUTO: 10.2 FL (ref 9.2–11.8)
POTASSIUM SERPL-SCNC: 4 MMOL/L (ref 3.5–5.5)
PROT SERPL-MCNC: 6 G/DL (ref 6.4–8.2)
Q-T INTERVAL, ECG07: 364 MS
Q-T INTERVAL, ECG07: 414 MS
Q-T INTERVAL, ECG07: 440 MS
QRS DURATION, ECG06: 72 MS
QRS DURATION, ECG06: 74 MS
QRS DURATION, ECG06: 74 MS
QTC CALCULATION (BEZET), ECG08: 396 MS
QTC CALCULATION (BEZET), ECG08: 401 MS
QTC CALCULATION (BEZET), ECG08: 414 MS
RBC # BLD AUTO: 3.67 M/UL (ref 4.2–5.3)
SODIUM SERPL-SCNC: 143 MMOL/L (ref 136–145)
TRIGL SERPL-MCNC: 101 MG/DL (ref ?–150)
TROPONIN-HIGH SENSITIVITY: 247 NG/L (ref 0–54)
TSH SERPL DL<=0.05 MIU/L-ACNC: 1.45 UIU/ML (ref 0.36–3.74)
VENTRICULAR RATE, ECG03: 50 BPM
VENTRICULAR RATE, ECG03: 55 BPM
VENTRICULAR RATE, ECG03: 78 BPM
VLDLC SERPL CALC-MCNC: 20.2 MG/DL
WBC # BLD AUTO: 8.5 K/UL (ref 4.6–13.2)

## 2022-08-24 PROCEDURE — 92928 PRQ TCAT PLMT NTRAC ST 1 LES: CPT | Performed by: INTERNAL MEDICINE

## 2022-08-24 PROCEDURE — B2111ZZ FLUOROSCOPY OF MULTIPLE CORONARY ARTERIES USING LOW OSMOLAR CONTRAST: ICD-10-PCS | Performed by: INTERNAL MEDICINE

## 2022-08-24 PROCEDURE — 99232 SBSQ HOSP IP/OBS MODERATE 35: CPT | Performed by: HOSPITALIST

## 2022-08-24 PROCEDURE — C1894 INTRO/SHEATH, NON-LASER: HCPCS | Performed by: INTERNAL MEDICINE

## 2022-08-24 PROCEDURE — 77030015766: Performed by: INTERNAL MEDICINE

## 2022-08-24 PROCEDURE — 74011000250 HC RX REV CODE- 250: Performed by: NURSE PRACTITIONER

## 2022-08-24 PROCEDURE — 82962 GLUCOSE BLOOD TEST: CPT

## 2022-08-24 PROCEDURE — 84484 ASSAY OF TROPONIN QUANT: CPT

## 2022-08-24 PROCEDURE — 65660000004 HC RM CVT STEPDOWN

## 2022-08-24 PROCEDURE — 99152 MOD SED SAME PHYS/QHP 5/>YRS: CPT | Performed by: INTERNAL MEDICINE

## 2022-08-24 PROCEDURE — 85027 COMPLETE CBC AUTOMATED: CPT

## 2022-08-24 PROCEDURE — 74011000636 HC RX REV CODE- 636: Performed by: INTERNAL MEDICINE

## 2022-08-24 PROCEDURE — 74011000250 HC RX REV CODE- 250: Performed by: INTERNAL MEDICINE

## 2022-08-24 PROCEDURE — 99223 1ST HOSP IP/OBS HIGH 75: CPT | Performed by: INTERNAL MEDICINE

## 2022-08-24 PROCEDURE — 74011250636 HC RX REV CODE- 250/636: Performed by: INTERNAL MEDICINE

## 2022-08-24 PROCEDURE — C1769 GUIDE WIRE: HCPCS | Performed by: INTERNAL MEDICINE

## 2022-08-24 PROCEDURE — 77030013519 HC DEV INFL BASIX MRTM -B: Performed by: INTERNAL MEDICINE

## 2022-08-24 PROCEDURE — 77030013797 HC KT TRNSDUC PRSSR EDWD -A: Performed by: INTERNAL MEDICINE

## 2022-08-24 PROCEDURE — 77030013761 HC KT HRT LFT ANGI -B: Performed by: INTERNAL MEDICINE

## 2022-08-24 PROCEDURE — 93005 ELECTROCARDIOGRAM TRACING: CPT

## 2022-08-24 PROCEDURE — C1874 STENT, COATED/COV W/DEL SYS: HCPCS | Performed by: INTERNAL MEDICINE

## 2022-08-24 PROCEDURE — 85347 COAGULATION TIME ACTIVATED: CPT

## 2022-08-24 PROCEDURE — 77030012468 HC VLV BLEEDBK CNTRL ABBT -B: Performed by: INTERNAL MEDICINE

## 2022-08-24 PROCEDURE — 74011250637 HC RX REV CODE- 250/637: Performed by: HOSPITALIST

## 2022-08-24 PROCEDURE — 93458 L HRT ARTERY/VENTRICLE ANGIO: CPT | Performed by: INTERNAL MEDICINE

## 2022-08-24 PROCEDURE — 84443 ASSAY THYROID STIM HORMONE: CPT

## 2022-08-24 PROCEDURE — 99153 MOD SED SAME PHYS/QHP EA: CPT | Performed by: INTERNAL MEDICINE

## 2022-08-24 PROCEDURE — 027034Z DILATION OF CORONARY ARTERY, ONE ARTERY WITH DRUG-ELUTING INTRALUMINAL DEVICE, PERCUTANEOUS APPROACH: ICD-10-PCS | Performed by: INTERNAL MEDICINE

## 2022-08-24 PROCEDURE — 74011250637 HC RX REV CODE- 250/637: Performed by: INTERNAL MEDICINE

## 2022-08-24 PROCEDURE — 77030012597: Performed by: INTERNAL MEDICINE

## 2022-08-24 PROCEDURE — 80053 COMPREHEN METABOLIC PANEL: CPT

## 2022-08-24 PROCEDURE — 80061 LIPID PANEL: CPT

## 2022-08-24 PROCEDURE — C1887 CATHETER, GUIDING: HCPCS | Performed by: INTERNAL MEDICINE

## 2022-08-24 PROCEDURE — 77030029997 HC DEV COM RDL R BND TELE -B: Performed by: INTERNAL MEDICINE

## 2022-08-24 PROCEDURE — 93306 TTE W/DOPPLER COMPLETE: CPT

## 2022-08-24 PROCEDURE — 85347 COAGULATION TIME ACTIVATED: CPT | Performed by: INTERNAL MEDICINE

## 2022-08-24 PROCEDURE — C1725 CATH, TRANSLUMIN NON-LASER: HCPCS | Performed by: INTERNAL MEDICINE

## 2022-08-24 PROCEDURE — 74011250637 HC RX REV CODE- 250/637: Performed by: NURSE PRACTITIONER

## 2022-08-24 PROCEDURE — 36415 COLL VENOUS BLD VENIPUNCTURE: CPT

## 2022-08-24 PROCEDURE — 4A023N7 MEASUREMENT OF CARDIAC SAMPLING AND PRESSURE, LEFT HEART, PERCUTANEOUS APPROACH: ICD-10-PCS | Performed by: INTERNAL MEDICINE

## 2022-08-24 DEVICE — STENT RONYX25015UX RESOLUTE ONYX 2.50X15
Type: IMPLANTABLE DEVICE | Status: FUNCTIONAL
Brand: RESOLUTE ONYX™

## 2022-08-24 RX ORDER — VERAPAMIL HYDROCHLORIDE 2.5 MG/ML
INJECTION, SOLUTION INTRAVENOUS
Status: DISPENSED
Start: 2022-08-24 | End: 2022-08-24

## 2022-08-24 RX ORDER — HEPARIN SODIUM 1000 [USP'U]/ML
INJECTION, SOLUTION INTRAVENOUS; SUBCUTANEOUS AS NEEDED
Status: DISCONTINUED | OUTPATIENT
Start: 2022-08-24 | End: 2022-08-24 | Stop reason: HOSPADM

## 2022-08-24 RX ORDER — ROSUVASTATIN CALCIUM 20 MG/1
40 TABLET, COATED ORAL
Status: DISCONTINUED | OUTPATIENT
Start: 2022-08-24 | End: 2022-08-25 | Stop reason: HOSPADM

## 2022-08-24 RX ORDER — HYDRALAZINE HYDROCHLORIDE 25 MG/1
25 TABLET, FILM COATED ORAL 3 TIMES DAILY
Status: DISCONTINUED | OUTPATIENT
Start: 2022-08-24 | End: 2022-08-25 | Stop reason: HOSPADM

## 2022-08-24 RX ORDER — THERA TABS 400 MCG
1 TAB ORAL DAILY
Status: DISCONTINUED | OUTPATIENT
Start: 2022-08-25 | End: 2022-08-25 | Stop reason: HOSPADM

## 2022-08-24 RX ORDER — HEPARIN SODIUM 200 [USP'U]/100ML
INJECTION, SOLUTION INTRAVENOUS AS NEEDED
Status: DISCONTINUED | OUTPATIENT
Start: 2022-08-24 | End: 2022-08-24 | Stop reason: HOSPADM

## 2022-08-24 RX ORDER — HEPARIN SODIUM 200 [USP'U]/100ML
INJECTION, SOLUTION INTRAVENOUS
Status: DISPENSED
Start: 2022-08-24 | End: 2022-08-24

## 2022-08-24 RX ORDER — CLOPIDOGREL BISULFATE 75 MG/1
75 TABLET ORAL DAILY
Status: DISCONTINUED | OUTPATIENT
Start: 2022-08-24 | End: 2022-08-25 | Stop reason: HOSPADM

## 2022-08-24 RX ORDER — ASPIRIN 81 MG/1
81 TABLET ORAL DAILY
Status: DISCONTINUED | OUTPATIENT
Start: 2022-08-24 | End: 2022-08-25 | Stop reason: HOSPADM

## 2022-08-24 RX ORDER — CLOPIDOGREL 300 MG/1
TABLET, FILM COATED ORAL AS NEEDED
Status: DISCONTINUED | OUTPATIENT
Start: 2022-08-24 | End: 2022-08-24 | Stop reason: HOSPADM

## 2022-08-24 RX ORDER — FENTANYL CITRATE 50 UG/ML
INJECTION, SOLUTION INTRAMUSCULAR; INTRAVENOUS
Status: DISPENSED
Start: 2022-08-24 | End: 2022-08-24

## 2022-08-24 RX ORDER — EZETIMIBE 10 MG/1
10 TABLET ORAL DAILY
Status: DISCONTINUED | OUTPATIENT
Start: 2022-08-24 | End: 2022-08-25 | Stop reason: HOSPADM

## 2022-08-24 RX ORDER — LOSARTAN POTASSIUM 50 MG/1
50 TABLET ORAL
Status: DISCONTINUED | OUTPATIENT
Start: 2022-08-24 | End: 2022-08-25 | Stop reason: HOSPADM

## 2022-08-24 RX ORDER — HYDRALAZINE HYDROCHLORIDE 25 MG/1
25 TABLET, FILM COATED ORAL
Status: DISCONTINUED | OUTPATIENT
Start: 2022-08-24 | End: 2022-08-25 | Stop reason: HOSPADM

## 2022-08-24 RX ORDER — FLUTICASONE PROPIONATE 220 UG/1
2 AEROSOL, METERED RESPIRATORY (INHALATION)
Status: DISCONTINUED | OUTPATIENT
Start: 2022-08-24 | End: 2022-08-24

## 2022-08-24 RX ORDER — LIDOCAINE HYDROCHLORIDE 10 MG/ML
INJECTION, SOLUTION EPIDURAL; INFILTRATION; INTRACAUDAL; PERINEURAL
Status: DISPENSED
Start: 2022-08-24 | End: 2022-08-24

## 2022-08-24 RX ORDER — MIDAZOLAM HYDROCHLORIDE 1 MG/ML
INJECTION, SOLUTION INTRAMUSCULAR; INTRAVENOUS AS NEEDED
Status: DISCONTINUED | OUTPATIENT
Start: 2022-08-24 | End: 2022-08-24 | Stop reason: HOSPADM

## 2022-08-24 RX ORDER — GABAPENTIN 300 MG/1
600 CAPSULE ORAL 2 TIMES DAILY
Status: DISCONTINUED | OUTPATIENT
Start: 2022-08-24 | End: 2022-08-25 | Stop reason: HOSPADM

## 2022-08-24 RX ORDER — MIDAZOLAM HYDROCHLORIDE 1 MG/ML
INJECTION, SOLUTION INTRAMUSCULAR; INTRAVENOUS
Status: DISPENSED
Start: 2022-08-24 | End: 2022-08-24

## 2022-08-24 RX ORDER — METOPROLOL TARTRATE 25 MG/1
25 TABLET, FILM COATED ORAL EVERY 12 HOURS
Status: DISCONTINUED | OUTPATIENT
Start: 2022-08-24 | End: 2022-08-25 | Stop reason: HOSPADM

## 2022-08-24 RX ORDER — VERAPAMIL HYDROCHLORIDE 2.5 MG/ML
INJECTION, SOLUTION INTRAVENOUS AS NEEDED
Status: DISCONTINUED | OUTPATIENT
Start: 2022-08-24 | End: 2022-08-24 | Stop reason: HOSPADM

## 2022-08-24 RX ORDER — CLOPIDOGREL 300 MG/1
TABLET, FILM COATED ORAL
Status: DISCONTINUED
Start: 2022-08-24 | End: 2022-08-24 | Stop reason: WASHOUT

## 2022-08-24 RX ORDER — METOPROLOL TARTRATE 25 MG/1
12.5 TABLET, FILM COATED ORAL EVERY 12 HOURS
Status: DISCONTINUED | OUTPATIENT
Start: 2022-08-24 | End: 2022-08-24

## 2022-08-24 RX ORDER — FENTANYL CITRATE 50 UG/ML
INJECTION, SOLUTION INTRAMUSCULAR; INTRAVENOUS AS NEEDED
Status: DISCONTINUED | OUTPATIENT
Start: 2022-08-24 | End: 2022-08-24 | Stop reason: HOSPADM

## 2022-08-24 RX ORDER — BUDESONIDE 1 MG/2ML
1000 INHALANT ORAL
Status: DISCONTINUED | OUTPATIENT
Start: 2022-08-24 | End: 2022-08-25 | Stop reason: HOSPADM

## 2022-08-24 RX ORDER — HEPARIN SODIUM 1000 [USP'U]/ML
INJECTION, SOLUTION INTRAVENOUS; SUBCUTANEOUS
Status: DISPENSED
Start: 2022-08-24 | End: 2022-08-24

## 2022-08-24 RX ORDER — LIDOCAINE HYDROCHLORIDE 10 MG/ML
INJECTION, SOLUTION EPIDURAL; INFILTRATION; INTRACAUDAL; PERINEURAL AS NEEDED
Status: DISCONTINUED | OUTPATIENT
Start: 2022-08-24 | End: 2022-08-24 | Stop reason: HOSPADM

## 2022-08-24 RX ORDER — GUAIFENESIN 100 MG/5ML
81 LIQUID (ML) ORAL ONCE
Status: DISCONTINUED | OUTPATIENT
Start: 2022-08-24 | End: 2022-08-24 | Stop reason: HOSPADM

## 2022-08-24 RX ADMIN — HYDRALAZINE HYDROCHLORIDE 25 MG: 25 TABLET, FILM COATED ORAL at 21:11

## 2022-08-24 RX ADMIN — HYDRALAZINE HYDROCHLORIDE 25 MG: 25 TABLET, FILM COATED ORAL at 17:14

## 2022-08-24 RX ADMIN — ROSUVASTATIN CALCIUM 40 MG: 20 TABLET, FILM COATED ORAL at 21:11

## 2022-08-24 RX ADMIN — GABAPENTIN 600 MG: 300 CAPSULE ORAL at 17:14

## 2022-08-24 RX ADMIN — ACETAMINOPHEN 650 MG: 325 TABLET, FILM COATED ORAL at 19:18

## 2022-08-24 RX ADMIN — SODIUM CHLORIDE, PRESERVATIVE FREE 10 ML: 5 INJECTION INTRAVENOUS at 21:11

## 2022-08-24 RX ADMIN — METOPROLOL TARTRATE 25 MG: 25 TABLET, FILM COATED ORAL at 21:10

## 2022-08-24 RX ADMIN — SODIUM CHLORIDE, PRESERVATIVE FREE 10 ML: 5 INJECTION INTRAVENOUS at 17:14

## 2022-08-24 RX ADMIN — SODIUM CHLORIDE, PRESERVATIVE FREE 10 ML: 5 INJECTION INTRAVENOUS at 05:40

## 2022-08-24 RX ADMIN — BUDESONIDE 1000 MCG: 1 SUSPENSION RESPIRATORY (INHALATION) at 21:09

## 2022-08-24 RX ADMIN — LOSARTAN POTASSIUM 50 MG: 50 TABLET, FILM COATED ORAL at 21:11

## 2022-08-24 NOTE — PROGRESS NOTES
Problem: Falls - Risk of  Goal: *Absence of Falls  Description: Document Nacho Romero Fall Risk and appropriate interventions in the flowsheet.   8/24/2022 0514 by Sarika Luo RN  Outcome: Progressing Towards Goal  Note: Fall Risk Interventions:      Mobility Interventions: Strengthening exercises (ROM-active/passive)  Elimination Interventions: Call light in reach    History of Falls Interventions: Evaluate medications/consider consulting pharmacy

## 2022-08-24 NOTE — PROGRESS NOTES
Patient arrived unit from cath lab. No apparent distress noted. Assessment and vital signs complete. Dual skin check with Filiberto Zambrano RN.  Family at bedside

## 2022-08-24 NOTE — H&P
History & Physical    Patient: Khanh Zamora MRN: [id number]  CSN: [id number]    YOB: 1960  Age: 63 y.o. Sex: female      DOA: 8/23/2022  CC:chest pain     PCP: Pb Perez MD       HPI:     Khanh Zamora is a 63 y.o. female who presents from home with chest pain. Chest pain started earlier today while she was sitting and talking with her daughter on the phone. Right side chest pain, with radiation to jaw and shoulder. N/V, projectile vomit. Diaphoretic. Took 4 nitro with no relief at home. No other medications taken at start of chest pain. Patient states similar pain in 2020 when she was diagnosed with NSTEMI. In ER normalized troponin, ASA given, nitro given, and GI cocktail given. Currently patient is chest pain free. Cardiology consult    ROS  GENERAL: no weight loss, no falls. HEENT: No change in vision, no earache, no tinnitus, no sore throat or sinus congestion. NECK: No pain or stiffness. PULMONARY: No shortness of breath, no cough or wheeze. Cardiovascular: chest pain, right side  GASTROINTESTINAL: N/V x1 episode  GENITOURINARY: No urinary frequency, no urgency, no hesitancy or dysuria. MUSCULOSKELETAL: No joint or muscle pain, no back pain, no recent trauma. DERMATOLOGIC: No rash, no itching, no lesions. ENDOCRINE: No polyuria, no polydipsia, no heat or cold intolerance. No recent change in weight. HEMATOLOGICAL: No anemia or easy bruising or bleeding. NEUROLOGIC: No headache, no seizures, no numbness, no tingling or weakness.       Past Medical History:   Diagnosis Date    Arthritis     CAD (coronary artery disease) 2015    Diabetes ([facility] [address] 2015    Dyspnea on exertion     Essential hypertension, benign     GERD (gastroesophageal reflux disease)     H/O emotional problems     History of fall 07/01/14    Twisted right knee    Hypertension 1995    Osteoarthritis of right knee     Osteoarthritis of right knee 9/22/2016    Other and unspecified hyperlipidemia     Pre-operative cardiovascular examination     Rheumatoid arthritis [facility])        Past Surgical History:   Procedure Laterality Date    COLONOSCOPY N/A 8/3/2022    COLONOSCOPY with polypectomies performed by Yessy Aranda MD at [facility] ENDOSCOPY    HX APPENDECTOMY       [address]      HX COLONOSCOPY      pt stated several times    HX GYN      endometrial ablation    HX HEMORRHOIDECTOMY      HX HYSTEROSCOPY WITH ENDOMETRIAL ABLATION      HX KNEE ARTHROSCOPY Right     HX KNEE REPLACEMENT Bilateral     HX LAP CHOLECYSTECTOMY      HX ORTHOPAEDIC      left foot spur removal    HX ROTATOR CUFF REPAIR Right 2020    AR CARDIAC SURG PROCEDURE UNLIST  2015    2 stents and 2018 - total of 3 stents per patient       Family History   Problem Relation Age of Onset    Heart Disease Other        Social History     Socioeconomic History    Marital status:    Tobacco Use    Smoking status: Every Day     Packs/day: 0.50     Years: 35.00     Pack years: 17.50     Types: Cigarettes    Smokeless tobacco: Never    Tobacco comments:     10 per day per patient     Vaping Use    Vaping Use: Former   Substance and Sexual Activity    Alcohol use: Yes     Alcohol/week: 0.0 standard drinks     Comment: rare 1-2 yr    Drug use: Not Currently     Types: Marijuana     Comment: occ    Sexual activity: Yes       Prior to Admission medications    Medication Sig Start Date End Date Taking? Authorizing Provider   hydroCHLOROthiazide (HYDRODIURIL) 25 mg tablet Take 25 mg by mouth in the morning. Indications: high blood pressure    Provider, Historical   glipiZIDE (GLUCOTROL) 10 mg tablet Take 10 mg by mouth in the morning. Provider, Historical   rosuvastatin (CRESTOR) 40 mg tablet Take 40 mg by mouth nightly. Provider, Historical   adalimumab (Humira Pen) 40 mg/0.8 mL injection pen by SubCUTAneous route every seven (7) days.     Provider, Historical   dulaglutide (TRULICITY) 3.95 UP/9.2 mL sub-q pen 0.75 mg by SubCUTAneous route every seven (7) days. Provider, Historical   ezetimibe (ZETIA) 10 mg tablet Take 10 mg by mouth daily. Provider, Historical   doxycycline (MONODOX) 100 mg capsule Take 100 mg by mouth two (2) times a day. Provider, Historical   metFORMIN (GLUCOPHAGE) 500 mg tablet Take 500 mg by mouth daily (with breakfast). Provider, Historical   lidocaine (LIDODERM) 5 % by TransDERmal route every twenty-four (24) hours. Apply patch to the affected area for 12 hours a day and remove for 12 hours a day. Provider, Historical   nitroglycerin (NITROSTAT) 0.4 mg SL tablet 1 Tab by SubLINGual route every five (5) minutes as needed for Chest Pain for up to 3 doses. Up to 3 doses. 5/18/20   Jessie Pinedo MD   atorvastatin (LIPITOR) 80 mg tablet Take 1 Tab by mouth nightly. 4/16/20   Kailee Hand MD   losartan (COZAAR) 50 mg tablet Take 1 Tab by mouth two (2) times a day. 4/16/20   Kailee Hand MD   clopidogreL (PLAVIX) 75 mg tab Take 1 Tab by mouth daily. Patient not taking: Reported on 8/2/2022 4/16/20   Kailee Hand MD   acetaminophen (TYLENOL) 500 mg tablet Take 1,000 mg by mouth every eight (8) hours as needed for Pain. Provider, Historical   amLODIPine (NORVASC) 10 mg tablet Take 10 mg by mouth daily. Patient not taking: Reported on 8/2/2022    Provider, Historical   hydroxychloroquine (PLAQUENIL) 200 mg tablet Take 200 mg by mouth two (2) times a day. Rheumatoid arthritis -stop a day before for surgery per Rheuamtology    Provider, Historical   venlafaxine (EFFEXOR) 75 mg tablet Take 75 mg by mouth daily. Provider, Historical   omeprazole (PRILOSEC) 40 mg capsule Take 40 mg by mouth daily. Frank Kimble MD   diphenhydrAMINE (BENADRYL) 25 mg capsule Take 25 mg by mouth every six (6) hours as needed for Itching. Provider, Historical   metoprolol succinate (TOPROL-XL) 25 mg XL tablet Take 1 Tab by mouth daily.   Patient taking differently: Take 50 mg by mouth in the morning. 6/4/15   Hodan Thomas MD   aspirin delayed-release 81 mg tablet Take 1 Tab by mouth daily. Patient not taking: Reported on 8/2/2022 6/3/15   Antonio Gottron, MD   gabapentin (NEURONTIN) 600 mg tablet Take 600 mg by mouth two (2) times a day. Other, MD Eliud       Allergies   Allergen Reactions    Oxycodone Itching    Tramadol Hives and Itching              Physical Exam:      Visit Vitals  BP (!) 159/63   Pulse (!) 59   Temp 98.1 °F (36.7 °C)   Resp 17   SpO2 99%       Physical Exam:  General:         Alert, cooperative, no acute distress    HEENT:           NC, Atraumatic. PERRLA, anicteric sclerae. Lungs:            CTA bilaterally. No Wheezing/Rhonchi/Rales  Heart:              RRR, No murmur, No Rubs, No Gallops  Abdomen:      Soft, Non distended, Non tender. +Bowel sounds, no HSM  Extremities:   No c/c/e  Psych:              Good insight. Not anxious or agitated. Neurologic:     Alert and oriented X 4. No acute neurological deficits     Lab/Data Review:  Labs: Results:       Chemistry Recent Labs     08/23/22  1720   *      K 3.7      CO2 24   BUN 9   CREA 1.02   CA 9.8   AGAP 8   BUCR 9*   AP 62   TP 6.7   ALB 4.0   GLOB 2.7   AGRAT 1.5      CBC w/Diff Recent Labs     08/23/22  1720   WBC 10.2   RBC 3.92*   HGB 13.0   HCT 38.5      GRANS 47   LYMPH 37   EOS 8*      Coagulation No results for input(s): PTP, INR, APTT, INREXT in the last 72 hours. Iron/Ferritin No results for input(s): IRON in the last 72 hours. No lab exists for component: TIBCCALC   BNP No results for input(s): BNPP in the last 72 hours. Cardiac Enzymes No results for input(s): CPK, CKND1, VIVIAN in the last 72 hours.     No lab exists for component: CKRMB, TROIP   Liver Enzymes Recent Labs     08/23/22  1720   TP 6.7   ALB 4.0   AP 62      Thyroid Studies Lab Results   Component Value Date/Time    TSH 3.99 (H) 04/15/2020 02:15 AM          All Micro Results       None            Imaging Reviewed:  Pending        Assessment:   Active Problems:    Chronic pain syndrome (11/6/2014)      History of depression (11/6/2014)      Marijuana use (11/6/2014)      Dyslipidemia, goal LDL below 70 (4/26/2016)      Type 2 diabetes mellitus without complication ([facility] [address] (2/97/2206)      Coronary artery disease involving native coronary artery of native heart without angina pectoris (4/26/2016)      S/P coronary artery stent placement (4/26/2016)      Smoker (4/26/2016)      Sleep apnea (8/22/2016)      Osteoarthritis of right knee (9/22/2016)      Chest pain (4/14/2020)            Plan:   Cardiology consult. Follow up tomorrow. NPO after midnight. Unknown if intervention tomorrow. Telemetry. Monitor chest pain and treat. Monitor metabolic panel and renal function   Smoking cessation with education for less than 5 minutes. Declined nicotine patch. Full code  Med rec reviewed with patient. Patient is alert and oriented x4 and of sound mind to update family. Family at bedside. 30 minutes in total spent today in preparation for visit, review of external notes and test results, review of test results, order placement, obtaining history, physical exam of the patient, and/or counseling the patient concerning diagnosis, test results, treatment plan and speaking with physicians and nurses involved in patient's care. Additional time spent in review and independent interpretation of external notes, imaging, labs via hospital EMR and/or Care Everywhere, as well as pre-charting activity all of which occur on the day of service.               Krissy Lee NP  8/23/2022, 9:46 PM

## 2022-08-24 NOTE — CONSULTS
Cardiology Initial Patient Referral Note    Cardiology referral request from Dr. Khushbu Shrestha for evaluation and management/treatment of CP    Date of  Admission: 8/23/2022  5:24 PM   Primary Care Physician:  Everton Reina MD    Attending Cardiologist: Dr. Santos Para:     -Unstable angina, intermediate HS-troponin up to 247  -CAD, most recent cardiac cath 04/2020 with findings as follows:  Patent previous stents in the right PDA and proximal/mid LAD. Critical lesions involve very small branches - D1 90% ostium and a branch of the first RPL artery. Non-critical stenosis in the mid LCx of about 40% with iFR 0.95.  -Echo 04/2020: Normal LV cavity size and systolic function with EF 60-65%, no RWMA, mild concentric LVH, moderate aortic regurgitation  -Recurrent vomiting x 1 year, s/p EGD 8/3/2022 that revealed a benign intrinsic stricture in GE junction (underwent balloon dilation); no evidence to suggest erosive esophagitis or eosinophilic esophagitis; sliding small size hiatal hernia  -Colonoscopy 8/3/2022: two sessile polyps   -HTN  -DM  -HLD, on Crestor  -Tobacco use disorder, pt reports desire to quit, down to 1/2 PPD  -Rheumatoid arthritis, on Adalimumab    Primary cardiologist is Dr. Malinda Tracey:       I saw, evaluated, interviewed and examined the patient personally. Patient came to hospital with chest pain and unstable angina reminiscent of her previous angina. Troponin increased now up to 247. Currently pain-free. Has been taking medication as prescribed. Patient is hemodynamically stable. Blood pressure slightly elevated. No evidence of fluid overload. No significant murmur. No edema or no rales. Labs reviewed. EKG reviewed personally without any dynamic ST changes of ischemia. Cardiac catheterization findings reviewed    Recommendation:  Continue aspirin Plavix and statin. Continue low-dose of beta-blocker. Echo ordered.   We will review  Discussed regarding management strategy which includes medical management vs. Ischemia evaluation ( non-invasive vs. Invasive). Risk, benefit and alternatives of each strategy discussed in detail. Risk, benefit, complication of LHC and possible PCI ( including but not limited to bleeding, vascular trauma requiring surgery,  infection, heart failure, stroke, MI, emergent bypass surgery, severe allergic reactions, kidney failure, dialysis and death ) were discussed with patient and willing to proceed with procedure. Will be using moderate sedation   By stating these are possible risks, this does not exclude the potential for additional risks not named here. Significant time spent in reviewing the case, multiple EMR databases, physician notes, reviewing pertinent labs and imaging studies  I spent significant amount of time for medical decision making and updated history, and other providers assessments as well. I personally agree with the findings as stated and the plan as documented. I saw, examined, and evaluated this patient and performed the substantive portion of the encounter for > 50% of the time including extensive history, physical exam, assessment and plan    Scarlett Sierra MD       -Discussed cardiac catheterization with pt, including risks/benefits/alternatives, all questions answered, pt in agreement with proceeding with cardiac cath today.  -Continue ASA, Plavix, Crestor.  -Will start low-dose PO Lopressor.  -Will check Echocardiogram for completeness.  -Encouraged tobacco cessation, pt reports desire/plan to quit. -Further recommendations based on test results. History of Present Illness: This is a 63 y.o. female admitted for Chest pain [R07.9]. Patient complains of: chest pain      Wendy Cuellar is a 63 y.o. female who presented to [facility] ER yesterday due to chest pain that started while at rest/watching TV yesterday.   Pt describes her pain as a pain/pressure to R chest, radiating to her back and jaw, associated with diaphoresis, shortness of breath and N/V. Pt reports that she has occasional chest pain, for which she takes SL NTG 1-2 tablets with relief; however yesterday she took 4 SL NTG without relief. Pt reports that her pain lasted for 2 hours and resolved after being given GI cocktail. She reports, however, that her symptoms felt \"like I was having a heart attack. \"  She admits that she recently ran out of Plavix, last dose she took was on Saturday 8/20/2022. She reports that she has recently undergone GI evaluation with EGD/colonoscopy and that she was told of a suspected \"allergic reaction\" in her esophagus, for which she was prescribed Flovent and advised to return for repeat EGD in a few months.     Cardiac risk factors: smoking/tobacco exposure, dyslipidemia, diabetes mellitus, hypertension, known Hx CAD      Review of Symptoms:  Except as stated above include:  Constitutional:  negative  Respiratory:  negative  Cardiovascular:  As per HPI  Gastrointestinal: As per HPI  Genitourinary:  negative  Musculoskeletal:  Negative  Neurological:  Negative  Dermatological:  Negative  Endocrinological: Negative  Psychological:  Negative       Past Medical History:     Past Medical History:   Diagnosis Date    Arthritis     CAD (coronary artery disease) 2015    Diabetes ([facility] [address] 2015    Dyspnea on exertion     Essential hypertension, benign     GERD (gastroesophageal reflux disease)     H/O emotional problems     History of fall 07/01/14    Twisted right knee    Hypertension 1995    Osteoarthritis of right knee     Osteoarthritis of right knee 9/22/2016    Other and unspecified hyperlipidemia     Pre-operative cardiovascular examination     Rheumatoid arthritis ([facility] [address]          Social History:     Social History     Socioeconomic History    Marital status:    Tobacco Use    Smoking status: Every Day     Packs/day: 0.50     Years: 35.00     Pack years: 17.50     Types: Cigarettes    Smokeless tobacco: Never Tobacco comments:     10 per day per patient     Vaping Use    Vaping Use: Former   Substance and Sexual Activity    Alcohol use: Yes     Alcohol/week: 0.0 standard drinks     Comment: rare 1-2 yr    Drug use: Not Currently     Types: Marijuana     Comment: occ    Sexual activity: Yes        Family History:     Family History   Problem Relation Age of Onset    Heart Disease Other         Medications:      Allergies   Allergen Reactions    Oxycodone Itching    Tramadol Hives and Itching        Current Facility-Administered Medications   Medication Dose Route Frequency    aspirin delayed-release tablet 81 mg  81 mg Oral DAILY    ezetimibe (ZETIA) tablet 10 mg  10 mg Oral DAILY    gabapentin (NEURONTIN) capsule 600 mg  600 mg Oral BID    rosuvastatin (CRESTOR) tablet 40 mg  40 mg Oral QHS    budesonide (PULMICORT) 1,000 mcg/2 mL nebulizer susp  1,000 mcg Nebulization BID RT    clopidogreL (PLAVIX) tablet 75 mg  75 mg Oral DAILY    sodium chloride (NS) flush 5-40 mL  5-40 mL IntraVENous Q8H    sodium chloride (NS) flush 5-40 mL  5-40 mL IntraVENous PRN    acetaminophen (TYLENOL) tablet 650 mg  650 mg Oral Q6H PRN    Or    acetaminophen (TYLENOL) suppository 650 mg  650 mg Rectal Q6H PRN    polyethylene glycol (MIRALAX) packet 17 g  17 g Oral DAILY PRN    ondansetron (ZOFRAN ODT) tablet 4 mg  4 mg Oral Q8H PRN    Or    ondansetron (ZOFRAN) injection 4 mg  4 mg IntraVENous Q6H PRN    enoxaparin (LOVENOX) injection 40 mg  40 mg SubCUTAneous DAILY    insulin lispro (HUMALOG) injection   SubCUTAneous AC&HS    glucose chewable tablet 16 g  4 Tablet Oral PRN    glucagon (GLUCAGEN) injection 1 mg  1 mg IntraMUSCular PRN    dextrose 10% infusion 0-250 mL  0-250 mL IntraVENous PRN    famotidine (PEPCID) tablet 20 mg  20 mg Oral DAILY         Physical Exam:   Visit Vitals  BP (!) 162/71 (BP 1 Location: Left upper arm, BP Patient Position: At rest)   Pulse 61   Temp 97.8 °F (36.6 °C)   Resp 18   Ht 5' 6\" (1.676 m)   Wt 77 kg (169 lb 12.1 oz)   SpO2 98%   Breastfeeding No   BMI 27.40 kg/m²     BP Readings from Last 3 Encounters:   08/24/22 (!) 162/71   08/03/22 (!) 103/58   08/28/20 149/67     Pulse Readings from Last 3 Encounters:   08/24/22 61   08/03/22 (!) 55   08/28/20 60     Wt Readings from Last 3 Encounters:   08/23/22 77 kg (169 lb 12.1 oz)   08/02/22 74.8 kg (165 lb)   08/28/20 79 kg (174 lb 2.6 oz)       General:  alert, cooperative, no distress, appears stated age  Neck:  supple  Lungs:  clear to auscultation bilaterally  Heart:  regular rate and rhythm  Abdomen:  abdomen is soft without significant tenderness, masses, organomegaly or guarding  Extremities:  atraumatic, no edema  Skin: Warm and dry.    Neuro: alert, oriented x3, affect appropriate, no focal neurological deficits, moves all extremities well, no involuntary movements  Psych: non focal     Data Review:     Recent Labs     08/24/22  0143 08/23/22  1720   WBC 8.5 10.2   HGB 12.1 13.0   HCT 36.1 38.5    315     Recent Labs     08/24/22  0143 08/23/22  1720    139   K 4.0 3.7    107   CO2 26 24   * 171*   BUN 10 9   CREA 0.96 1.02   CA 9.3 9.8   ALB 3.5 4.0   ALT 49 60*       Results for orders placed or performed during the hospital encounter of 08/23/22   EKG, 12 LEAD, INITIAL   Result Value Ref Range    Ventricular Rate 55 BPM    Atrial Rate 55 BPM    P-R Interval 198 ms    QRS Duration 72 ms    Q-T Interval 414 ms    QTC Calculation (Bezet) 396 ms    Calculated P Axis 90 degrees    Calculated R Axis 16 degrees    Calculated T Axis 47 degrees    Diagnosis       Sinus bradycardia  Septal infarct (cited on or before 15-APR-2020)  Abnormal ECG  When compared with ECG of 15-APR-2020 02:13,  Questionable change in initial forces of Septal leads       Last Lipid:    Lab Results   Component Value Date/Time    Cholesterol, total 110 08/24/2022 01:43 AM    HDL Cholesterol 56 08/24/2022 01:43 AM    LDL, calculated 33.8 08/24/2022 01:43 AM    Triglyceride 101 08/24/2022 01:43 AM    CHOL/HDL Ratio 2.0 08/24/2022 01:43 AM       Cardiographics:     EKG Results       Procedure 720 Value Units Date/Time    EKG, 12 LEAD, INITIAL [[id number]] Collected: 08/23/22 1857    Order Status: Completed Updated: 08/24/22 0648     Ventricular Rate 55 BPM      Atrial Rate 55 BPM      P-R Interval 198 ms      QRS Duration 72 ms      Q-T Interval 414 ms      QTC Calculation (Bezet) 396 ms      Calculated P Axis 90 degrees      Calculated R Axis 16 degrees      Calculated T Axis 47 degrees      Diagnosis --     Sinus bradycardia  Septal infarct (cited on or before 15-APR-2020)  Abnormal ECG  When compared with ECG of 15-APR-2020 02:13,  Questionable change in initial forces of Septal leads      EKG, 12 LEAD, INITIAL [[id number]]     Order Status: Sent               XR Results (most recent):  Results from Hospital Encounter encounter on 08/23/22    XR CHEST PA LAT    Narrative  TWO VIEW CHEST RADIOGRAPH    INDICATION: CP. Chest pain for 45 minutes prior to arrival.    COMPARISON: Chest radiograph 4/14/2020. TECHNIQUE: PA and lateral views of the chest.      FINDINGS:    MEDIASTINUM: Cardiac silhouette is within normal limits. LUNGS: Lungs are clear. BONES/OTHER: No acute osseous abnormality. Impression  No acute cardiopulmonary process.         Signed By: Narcisa Jerome PA-C     August 24, 2022

## 2022-08-24 NOTE — PROGRESS NOTES
Bedside and Verbal shift change report given to Christie Marino (oncoming nurse) by Inga(offgoing nurse). Report included the following information SBAR, Intake/Output, MAR, Recent Results, and Cardiac Rhythm NSR .

## 2022-08-24 NOTE — PROGRESS NOTES
Comprehensive Nutrition Assessment    Type and Reason for Visit: Initial, Positive nutrition screen, NPO/clear liquid    Nutrition Recommendations/Plan:   Resume diet when medically appropriate; will update oral supplements to reflect current diet   Plan to add MVI daily   Monitor PO intake, compliance of oral supplement, weight, labs, and plan of care during admission. Malnutrition Assessment:  Malnutrition Status: At risk for malnutrition (specify) (r/t poor PO intake and presistent n/v PTA) (08/24/22 1407)      Nutrition History and Allergies:   Past medical history: arthritis, NSTEMI, CAD, diabetes, dyspnea, HTN, GERD, hx of fall, osteoarthritis, RA. NKFA. Weight history per chart review:  CBW: 08/24/22 : 76.7 kg (169 lb), < 30 DAYS: 08/02/22 : 74.8 kg (165 lb), 2 years: 08/28/20 : 79 kg (174 lb 2.6 oz). Weight stable x 30 days. Patient's family reported 25 lbs x 6 mons which would be -12.9% significant change x 180 days. Nutrition Assessment:    Visited pt in room, pt was unavailable-off of unit at [facility]-pt currently NPO. Patient's daughter and sister was in the room. Patient's sister reported pt had decreased appetite for several months d/t difficulty with swallowing and emesis daily. 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 Patient's sister also reported 25 lbs weight loss during the last 6 months as well as hair loss experienced by patient. Current labs WNL. Will add oral supplements when diet advances. Nutrition Related Findings:    Last BM 8/23. Output: 0mL (urine-PTA). Pertinent Medications: rosuvastatin, metoprolol, miralax, zofran, humalog, pepcid.  Wound Type: None    Current Nutrition Intake & Therapies:  Average Meal Intake: NPO  Average Supplement Intake: NPO  ADULT ORAL NUTRITION SUPPLEMENT Lunch, Dinner; Diabetic Supplement  DIET NPO Sips of Water with Meds    Anthropometric Measures:  Height: 5' 6\" (167.6 cm)  Ideal Body Weight (IBW): 130 lbs (59 kg)  Admission Body Weight: 169 lb 12.1 oz  Current Body Wt:  77 kg (169 lb 12.1 oz), 130.6 % IBW. Bed scale  Current BMI (kg/m2): 27.4  Usual Body Weight: 79.4 kg (175 lb)  % Weight Change (Calculated): -3  Weight Adjustment: No adjustment  BMI Category: Overweight (BMI 25.0-29. 9)    Estimated Daily Nutrient Needs:  Energy Requirements Based On: Formula (MSJ x 1.2-1.3)  Weight Used for Energy Requirements: Current  Energy (kcal/day): 8676-3226  Weight Used for Protein Requirements: Current (0.8-1.0)  Protein (g/day): 62-77  Method Used for Fluid Requirements: 1 ml/kcal  Fluid (ml/day): 7073-9282    Nutrition Diagnosis:   Inadequate oral intake related to inadequate protein-energy intake, early satiety as evidenced by poor intake prior to admission, GI abnormality, vomiting, nausea    Nutrition Interventions:   Food and/or Nutrient Delivery: Start oral diet, Vitamin supplement, Mineral supplement, Start oral nutrition supplement  Nutrition Education/Counseling: Education not indicated, No recommendations at this time  Coordination of Nutrition Care: Continue to monitor while inpatient  Plan of Care discussed with: patient's family    Goals:     Goals: Meet at least 75% of estimated needs, by next RD assessment       Nutrition Monitoring and Evaluation:   Behavioral-Environmental Outcomes: None identified  Food/Nutrient Intake Outcomes: Food and nutrient intake, Diet advancement/tolerance, Supplement intake, Vitamin/mineral intake  Physical Signs/Symptoms Outcomes: GI status, Hemodynamic status, Meal time behavior, Nutrition focused physical findings, Weight, Biochemical data    Discharge Planning:     Too soon to determine    Donel Ritika Hernandez RDN, LD   Contact: [phone]

## 2022-08-24 NOTE — ROUTINE PROCESS
Cath holding summary     Patient escorted to cath holding from inpatient room #205, alert and oriented x 4, voicing no complaints. Changed into gown and placed on monitor. NPO since MN. Lab results, med rec and H&P reviewed on chart. PIV x 2 inserted without difficulty. [id number]  TRANSFER - OUT REPORT:    Verbal report given to Jasen Andrdae RN (name) on Sohalo  being transferred to Cath Lab (unit) for ordered procedure     Report consisted of patients Situation, Background, Assessment and   Recommendations(SBAR). Information from the following report(s) SBAR, Intake/Output, MAR, Recent Results, and Pre Procedure Checklist was reviewed with the receiving nurse. Lines:   Peripheral IV 08/23/22 (Active)   Site Assessment Clean, dry, & intact 08/24/22 0912   Phlebitis Assessment 0 08/24/22 0912   Dressing Status Clean, dry, & intact 08/24/22 0912   Dressing Type Transparent 08/24/22 0912   Hub Color/Line Status Flushed 08/24/22 0912       Peripheral IV 08/24/22 Right Antecubital (Active)   Site Assessment Clean, dry, & intact 08/24/22 0912   Phlebitis Assessment 0 08/24/22 0912   Dressing Status Clean, dry, & intact 08/24/22 0912   Dressing Type Transparent 08/24/22 0912   Hub Color/Line Status Flushed 08/24/22 0912   Opportunity for questions and clarification was provided. Patient transported with:   Registered Nurse      [id number]  TRANSFER - IN REPORT:    Verbal report received from Iman Lorenzo (name) on Sohalo  being received from Cath Lab (unit) for ordered procedure    Report consisted of patients Situation, Background, Assessment and   Recommendations(SBAR). Information from the following report(s) SBAR, Procedure Summary, Intake/Output, MAR, and Recent Results was reviewed with the receiving nurse. Opportunity for questions and clarification was provided. Assessment completed upon patients arrival to unit and care assumed.        Stent placed to posterior lateral branch. Right radial access closed with TR Band; 10 cc total. Reviewed with Anastacia Ledger at beside. Placed on monitor. NAD and VSS. [address] Padmini Walters provided to Jonh Olea RN.

## 2022-08-24 NOTE — PROGRESS NOTES
Patient  states, she did no take BP medications this morning due to NPO status this morning  for cardiac cath. Attending informed of elevated BP.

## 2022-08-24 NOTE — PROGRESS NOTES
TRANSFER - IN REPORT:    Verbal report received from LIDYA ORTIZ (name) on March Benes  being received from ED(unit) for routine progression of care      Report consisted of patients Situation, Background, Assessment and   Recommendations(SBAR). Information from the following report(s) SBAR, Kardex, MAR, Recent Results, and Cardiac Rhythm NSR  was reviewed with the receiving nurse. Screening Assessment for C Diff:     1. Three (3) or more diarrheal (liquid unformed) stools in less than 24 hours  no     2. If yes, has patient off laxatives for more than 24 hours? No     3. Was a stool specimen sent for C. Difficile toxin A and B? No     4. Was the patient placed on contact isolation? No    Opportunity for questions and clarification was provided. Assessment completed upon patients arrival to unit and care assumed.

## 2022-08-24 NOTE — PROGRESS NOTES
TRANSFER - OUT REPORT:    Verbal report given to Alonna Apley, RN on Bryanna  being transferred to CVT Stepdown for routine post - op       Report consisted of patients Situation, Background, Assessment and   Recommendations(SBAR). Information from the following report(s) SBAR, Kardex, Procedure Summary, and Med Rec Status was reviewed with the receiving nurse. Lines:   Peripheral IV 08/23/22 (Active)   Site Assessment Clean, dry, & intact 08/24/22 0912   Phlebitis Assessment 0 08/24/22 0912   Dressing Status Clean, dry, & intact 08/24/22 0912   Dressing Type Transparent 08/24/22 0912   Hub Color/Line Status Flushed 08/24/22 0912       Peripheral IV 08/24/22 Right Antecubital (Active)   Site Assessment Clean, dry, & intact 08/24/22 0912   Phlebitis Assessment 0 08/24/22 0912   Dressing Status Clean, dry, & intact 08/24/22 0912   Dressing Type Transparent 08/24/22 0912   Hub Color/Line Status Flushed 08/24/22 0912        Opportunity for questions and clarification was provided.       Patient transported with:   [employer]

## 2022-08-24 NOTE — PROGRESS NOTES
[facility]  Progress Note    Patient: Bayron Oconnor Age: 63 y.o. : 1960 MR#: [id number] SSN: [id number]  Date/Time: 2022    Subjective:       Patient seen and examined in the Cath Lab, she is status post cardiac cath, 1 stent placed. Patient states she has been chest pain free since procedure. She denies cough shortness of breath, nausea vomiting diarrhea constipation. Denies urinary symptoms. Wrist is doing okay. She states that she smokes, this is her fourth sentence she intends to quit. Her  smokes as well, and states she will discuss with him. Assessment/Plan:     1.  unstable angina status postcardiac cath, drug-eluting stent. Continue aspirin, Plavix, Crestor, Lopressor. Tobacco cessation. Avoidance of secondhand smoke exposure. Follow echo. Cardiology input appreciated. 2. Tobacco use d/o. Smoking cessation education  3. Sleep apnea  4. DM2. A1c 6.6. Consult diabetes educator. 5. Dyslipidemia on statin  6. 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 Chronic pain syndrome  8. Colonoscopy 8/3/2022: two sessile polyps   9. Rheumatoid arthritis, on Adalimumab  10. Secondhand smoke exposure. Patient states she will talk with her . 11.  Hypertension, suboptimal control. Restart losartan. Increase beta-blocker, heart rate noted. Low-dose hydralazine. 12. DVT prophylaxis  13. .  Full code. Anticipate home 24 to 48 hours, when okay with cardiology. Primary cardiologist is Dr. Evita Harrington. Additional Notes: Discussed on IDR.     Case discussed with:  [x]Patient  []Family  [x]Nursing  [x]Case Management  DVT Prophylaxis:  [x]Lovenox  []Hep SQ  []SCDs  []Coumadin   []On Heparin gtt    Objective:   VS: Visit Vitals  BP (!) 170/78 (BP 1 Location: Left upper arm)   Pulse (!) 53   Temp 98.5 °F (36.9 °C)   Resp 16   Ht 5' 6\" (1.676 m)   Wt 77 kg (169 lb 12.1 oz)   SpO2 96%   Breastfeeding No   BMI 27.40 kg/m²      Tmax/24hrs: Temp (24hrs), Av.1 °F (36.7 °C), Min:97.8 °F (36.6 °C), Max:98.5 °F (36.9 °C)    Input/Output:   Intake/Output Summary (Last 24 hours) at 2022 0854  Last data filed at 2022 0834  Gross per 24 hour   Intake 0 ml   Output --   Net 0 ml       General: Awake alert and oriented x4. Found sitting up in bed, speaking in full sentences on room air  HEENT normocephalic atraumatic pupils equally round and reactive to light. Oropharynx clear  Cardiovascular: Regular rate and rhythm  Pulmonary: Clear to auscultation bilaterally  GI: Soft nontender nondistended nabs  Extremities: No edema. DP 2+ bilaterally.   Pressure dressing to right wrist  Neuro: No focal deficit  Additional: No rash to visible skin    Labs:    Recent Results (from the past 24 hour(s))   EKG, 12 LEAD, INITIAL    Collection Time: 22  5:06 PM   Result Value Ref Range    Ventricular Rate 78 BPM    Atrial Rate 78 BPM    P-R Interval 174 ms    QRS Duration 74 ms    Q-T Interval 364 ms    QTC Calculation (Bezet) 414 ms    Calculated P Axis 47 degrees    Calculated R Axis 46 degrees    Calculated T Axis 45 degrees    Diagnosis       Normal sinus rhythm  Nonspecific ST abnormality  Abnormal ECG  When compared with ECG of 15-APR-2020 02:13,  Nonspecific T wave abnormality now evident in Inferior leads     CBC WITH AUTOMATED DIFF    Collection Time: 22  5:20 PM   Result Value Ref Range    WBC 10.2 4.6 - 13.2 K/uL    RBC 3.92 (L) 4.20 - 5.30 M/uL    HGB 13.0 12.0 - 16.0 g/dL    HCT 38.5 35.0 - 45.0 %    MCV 98.2 78.0 - 100.0 FL    MCH 33.2 24.0 - 34.0 PG    MCHC 33.8 31.0 - 37.0 g/dL    RDW 12.9 11.6 - 14.5 %    PLATELET 505 300 - 442 K/uL    MPV 9.8 9.2 - 11.8 FL    NRBC 0.0 0  WBC    ABSOLUTE NRBC 0.00 0.00 - 0.01 K/uL NEUTROPHILS 47 40 - 73 %    LYMPHOCYTES 37 21 - 52 %    MONOCYTES 7 3 - 10 %    EOSINOPHILS 8 (H) 0 - 5 %    BASOPHILS 1 0 - 2 %    IMMATURE GRANULOCYTES 0 0.0 - 0.5 %    ABS. NEUTROPHILS 4.8 1.8 - 8.0 K/UL    ABS. LYMPHOCYTES 3.7 (H) 0.9 - 3.6 K/UL    ABS. MONOCYTES 0.7 0.05 - 1.2 K/UL    ABS. EOSINOPHILS 0.9 (H) 0.0 - 0.4 K/UL    ABS. BASOPHILS 0.1 0.0 - 0.1 K/UL    ABS. IMM. GRANS. 0.0 0.00 - 0.04 K/UL    DF AUTOMATED     METABOLIC PANEL, COMPREHENSIVE    Collection Time: 08/23/22  5:20 PM   Result Value Ref Range    Sodium 139 136 - 145 mmol/L    Potassium 3.7 3.5 - 5.5 mmol/L    Chloride 107 100 - 111 mmol/L    CO2 24 21 - 32 mmol/L    Anion gap 8 3.0 - 18 mmol/L    Glucose 171 (H) 74 - 99 mg/dL    BUN 9 7.0 - 18 MG/DL    Creatinine 1.02 0.6 - 1.3 MG/DL    BUN/Creatinine ratio 9 (L) 12 - 20      GFR est AA >60 >60 ml/min/1.73m2    GFR est non-AA 55 (L) >60 ml/min/1.73m2    Calcium 9.8 8.5 - 10.1 MG/DL    Bilirubin, total 0.7 0.2 - 1.0 MG/DL    ALT (SGPT) 60 (H) 13 - 56 U/L    AST (SGOT) 41 (H) 10 - 38 U/L    Alk.  phosphatase 62 45 - 117 U/L    Protein, total 6.7 6.4 - 8.2 g/dL    Albumin 4.0 3.4 - 5.0 g/dL    Globulin 2.7 2.0 - 4.0 g/dL    A-G Ratio 1.5 0.8 - 1.7     TROPONIN-HIGH SENSITIVITY    Collection Time: 08/23/22  5:20 PM   Result Value Ref Range    Troponin-High Sensitivity 7 0 - 54 ng/L   D DIMER    Collection Time: 08/23/22  5:20 PM   Result Value Ref Range    D DIMER 0.39 <0.46 ug/ml(FEU)   HEMOGLOBIN A1C WITH EAG    Collection Time: 08/23/22  5:20 PM   Result Value Ref Range    Hemoglobin A1c 6.6 (H) 4.2 - 5.6 %    Est. average glucose 143 mg/dL   TROPONIN-HIGH SENSITIVITY    Collection Time: 08/23/22  6:50 PM   Result Value Ref Range    Troponin-High Sensitivity 15 0 - 54 ng/L   EKG, 12 LEAD, INITIAL    Collection Time: 08/23/22  6:57 PM   Result Value Ref Range    Ventricular Rate 55 BPM    Atrial Rate 55 BPM    P-R Interval 198 ms    QRS Duration 72 ms    Q-T Interval 414 ms    QTC Calculation (Bezet) 396 ms    Calculated P Axis 90 degrees    Calculated R Axis 16 degrees    Calculated T Axis 47 degrees    Diagnosis       Sinus bradycardia  Septal infarct (cited on or before 15-APR-2020)  Abnormal ECG  When compared with ECG of 15-APR-2020 02:13,  Questionable change in initial forces of Septal leads     GLUCOSE, POC    Collection Time: 08/23/22 10:08 PM   Result Value Ref Range    Glucose (POC) 124 (H) 70 - 110 mg/dL   LIPID PANEL    Collection Time: 08/24/22  1:43 AM   Result Value Ref Range    LIPID PROFILE          Cholesterol, total 110 <200 MG/DL    Triglyceride 101 <150 MG/DL    HDL Cholesterol 56 40 - 60 MG/DL    LDL, calculated 33.8 0 - 100 MG/DL    VLDL, calculated 20.2 MG/DL    CHOL/HDL Ratio 2.0 0 - 5.0     CBC W/O DIFF    Collection Time: 08/24/22  1:43 AM   Result Value Ref Range    WBC 8.5 4.6 - 13.2 K/uL    RBC 3.67 (L) 4.20 - 5.30 M/uL    HGB 12.1 12.0 - 16.0 g/dL    HCT 36.1 35.0 - 45.0 %    MCV 98.4 78.0 - 100.0 FL    MCH 33.0 24.0 - 34.0 PG    MCHC 33.5 31.0 - 37.0 g/dL    RDW 12.6 11.6 - 14.5 %    PLATELET 122 969 - 447 K/uL    MPV 10.2 9.2 - 11.8 FL    NRBC 0.0 0  WBC    ABSOLUTE NRBC 0.00 0.00 - 1.71 K/uL   METABOLIC PANEL, COMPREHENSIVE    Collection Time: 08/24/22  1:43 AM   Result Value Ref Range    Sodium 143 136 - 145 mmol/L    Potassium 4.0 3.5 - 5.5 mmol/L    Chloride 109 100 - 111 mmol/L    CO2 26 21 - 32 mmol/L    Anion gap 8 3.0 - 18 mmol/L    Glucose 146 (H) 74 - 99 mg/dL    BUN 10 7.0 - 18 MG/DL    Creatinine 0.96 0.6 - 1.3 MG/DL    BUN/Creatinine ratio 10 (L) 12 - 20      GFR est AA >60 >60 ml/min/1.73m2    GFR est non-AA 59 (L) >60 ml/min/1.73m2    Calcium 9.3 8.5 - 10.1 MG/DL    Bilirubin, total 0.5 0.2 - 1.0 MG/DL    ALT (SGPT) 49 13 - 56 U/L    AST (SGOT) 32 10 - 38 U/L    Alk.  phosphatase 56 45 - 117 U/L    Protein, total 6.0 (L) 6.4 - 8.2 g/dL    Albumin 3.5 3.4 - 5.0 g/dL    Globulin 2.5 2.0 - 4.0 g/dL    A-G Ratio 1.4 0.8 - 1.7 TROPONIN-HIGH SENSITIVITY    Collection Time: 08/24/22  6:08 AM   Result Value Ref Range    Troponin-High Sensitivity 247 (HH) 0 - 54 ng/L   TSH 3RD GENERATION    Collection Time: 08/24/22  6:08 AM   Result Value Ref Range    TSH 1.45 0.36 - 3.74 uIU/mL   GLUCOSE, POC    Collection Time: 08/24/22  7:33 AM   Result Value Ref Range    Glucose (POC) 113 (H) 70 - 110 mg/dL   EKG, 12 LEAD, SUBSEQUENT    Collection Time: 08/24/22  8:29 AM   Result Value Ref Range    Ventricular Rate 50 BPM    Atrial Rate 50 BPM    P-R Interval 198 ms    QRS Duration 74 ms    Q-T Interval 440 ms    QTC Calculation (Bezet) 401 ms    Calculated P Axis 63 degrees    Calculated R Axis 50 degrees    Calculated T Axis 66 degrees    Diagnosis       Sinus bradycardia  Otherwise normal ECG  When compared with ECG of 23-AUG-2022 18:57,  No significant change was found       Additional Data Reviewed:      Signed By: Alta Rajput MD     August 24, 2022 8:54 AM

## 2022-08-24 NOTE — H&P
History and Physical    Patient: Micky Casper MRN: [id number]  SSN: [id number]    YOB: 1960  Age: 63 y.o. Sex: female      Subjective:      Micky Casper is a 63 y.o. female who presents to [facility] ER with complaint of Chest Pain. Patient states that while she was talking to her Daughter on the phone on 8/23/2022 that she began to experience a \"take-your-breath-away pain\" that she declines to rate on a /10 scale that radiated to her jaw and right shoulder and was associated with nausea, diaphoresis, shortness of breath, and \"projectile\" vomiting. Patient reports that she could not exert herself while the pain was present (and, therefore, cannot say that it was exertional) and did not improve with rest or 4 tablets of Nitroglycerin. Patient reports that her chest pain was similar to the time she had an NSTEMI in 2020. Patient has a history of CAD S/P Cardiac Stents x3 (last Stent placed in 2018), Hypertension, Hyperlipidemia, Diabetes Mellitus Type 2, Tobacco Abuse, and Family History of Early CAD (Father had CAD in early 46s). Patient reportedly takes Aspirin and Clopidogrel at home. Patient states that she \"failed\" her last Exercise Cardiac Stress Test and states that at her baseline she would not be able to walk up 2 flights of stairs without losing her breath. In [facility] ER, Patient is noted to have Temperature 98.1°F, Heart Rate 84 bpm, Respiration Rate 20 bopm, Blood Pressure 159/63 mm Hg to 156/81 mm Hg, SpO2 96% on Room Air, WBC 10.2, Neut% 47%, Neut# 4.8, Glucose 171 mg/dL, BUN 9, Creatinine 1.02, eGFR 55/>60, ALT 60, AST 41, Troponin 7 ng/L to 15 ng/L. Patient is admitted to [facility] Telemetry Unit for management of Chest Pain w/ CAD S/P Cardiac Stents concerning for ACS.     Past Medical History:   Diagnosis Date    Arthritis     CAD (coronary artery disease) 2015    Diabetes ([address] 2015    Dyspnea on exertion     Essential hypertension, benign     GERD (gastroesophageal reflux disease)     H/O emotional problems     History of fall 07/01/14    Twisted right knee    Hypertension 1995    Osteoarthritis of right knee     Osteoarthritis of right knee 9/22/2016    Other and unspecified hyperlipidemia     Pre-operative cardiovascular examination     Rheumatoid arthritis [facility])      Past Surgical History:   Procedure Laterality Date    COLONOSCOPY N/A 8/3/2022    COLONOSCOPY with polypectomies performed by Taira Borjas MD at [facility] ENDOSCOPY    HX APPENDECTOMY       [address]      HX COLONOSCOPY      pt stated several times    HX GYN      endometrial ablation    HX HEMORRHOIDECTOMY      HX HYSTEROSCOPY WITH ENDOMETRIAL ABLATION      HX KNEE ARTHROSCOPY Right     HX KNEE REPLACEMENT Bilateral     HX LAP CHOLECYSTECTOMY      HX ORTHOPAEDIC      left foot spur removal    HX ROTATOR CUFF REPAIR Right 2020    [place] CARDIAC SURG PROCEDURE UNLIST  2015    2 stents and 2018 - total of 3 stents per patient      Family History   Problem Relation Age of Onset    Heart Disease Other      Social History     Tobacco Use    Smoking status: Every Day     Packs/day: 0.50     Years: 35.00     Pack years: 17.50     Types: Cigarettes    Smokeless tobacco: Never    Tobacco comments:     10 per day per patient     Substance Use Topics    Alcohol use: Yes     Alcohol/week: 0.0 standard drinks     Comment: rare 1-2 yr      Prior to Admission medications    Medication Sig Start Date End Date Taking? Authorizing Provider   hydroCHLOROthiazide (HYDRODIURIL) 25 mg tablet Take 25 mg by mouth in the morning. Indications: high blood pressure    Provider, Historical   glipiZIDE (GLUCOTROL) 10 mg tablet Take 10 mg by mouth in the morning. Provider, Historical   rosuvastatin (CRESTOR) 40 mg tablet Take 40 mg by mouth nightly. Provider, Historical   adalimumab (Humira Pen) 40 mg/0.8 mL injection pen by SubCUTAneous route every seven (7) days.     Provider, Historical   dulaglutide (TRULICITY) 3.01 mg/0.5 mL sub-q pen 0.75 mg by SubCUTAneous route every seven (7) days. Provider, Historical   ezetimibe (ZETIA) 10 mg tablet Take 10 mg by mouth daily. Provider, Historical   doxycycline (MONODOX) 100 mg capsule Take 100 mg by mouth two (2) times a day. Provider, Historical   metFORMIN (GLUCOPHAGE) 500 mg tablet Take 500 mg by mouth daily (with breakfast). Provider, Historical   lidocaine (LIDODERM) 5 % by TransDERmal route every twenty-four (24) hours. Apply patch to the affected area for 12 hours a day and remove for 12 hours a day. Provider, Historical   nitroglycerin (NITROSTAT) 0.4 mg SL tablet 1 Tab by SubLINGual route every five (5) minutes as needed for Chest Pain for up to 3 doses. Up to 3 doses. 5/18/20   Miriam Roblero MD   atorvastatin (LIPITOR) 80 mg tablet Take 1 Tab by mouth nightly. 4/16/20   Debbie Arreguin MD   losartan (COZAAR) 50 mg tablet Take 1 Tab by mouth two (2) times a day. 4/16/20   Debbie Arreguin MD   clopidogreL (PLAVIX) 75 mg tab Take 1 Tab by mouth daily. Patient not taking: Reported on 8/2/2022 4/16/20   Debbie Arreguin MD   acetaminophen (TYLENOL) 500 mg tablet Take 1,000 mg by mouth every eight (8) hours as needed for Pain. Provider, Historical   amLODIPine (NORVASC) 10 mg tablet Take 10 mg by mouth daily. Patient not taking: Reported on 8/2/2022    Provider, Historical   hydroxychloroquine (PLAQUENIL) 200 mg tablet Take 200 mg by mouth two (2) times a day. Rheumatoid arthritis -stop a day before for surgery per Rheuamtology    Provider, Historical   venlafaxine (EFFEXOR) 75 mg tablet Take 75 mg by mouth daily. Provider, Historical   omeprazole (PRILOSEC) 40 mg capsule Take 40 mg by mouth daily. Khanh Rubalcava MD   diphenhydrAMINE (BENADRYL) 25 mg capsule Take 25 mg by mouth every six (6) hours as needed for Itching. Provider, Historical   metoprolol succinate (TOPROL-XL) 25 mg XL tablet Take 1 Tab by mouth daily.   Patient taking differently: Take 50 mg by mouth in the morning. 6/4/15   Johan Thomas MD   aspirin delayed-release 81 mg tablet Take 1 Tab by mouth daily. Patient not taking: Reported on 8/2/2022 6/3/15   Stephane Ventura MD   gabapentin (NEURONTIN) 600 mg tablet Take 600 mg by mouth two (2) times a day. Eliud Panda MD        Allergies   Allergen Reactions    Oxycodone Itching    Tramadol Hives and Itching       Review of Systems:  (-) Cough  (-) Increased Sputum Production  (+) Chest Pain  (-) Abdominal Pain  (+) Nausea  (+) Vomiting  (-) Diarrhea  (-) Constipation  (+) Diaphoresis  All other systems have been reviewed and are negative      Objective:     Vitals:    08/23/22 1717 08/23/22 1900   BP: (!) 156/81 (!) 159/63   Pulse: 84 (!) 59   Resp: 20 17   Temp: 98.1 °F (36.7 °C)    SpO2: 96% 99%        Physical Exam:  General:  Older adult female lying in bed in no acute distress  HEENT:  Atraumatic, normocephalic; Pupils equally round and reactive to light with accommodation; Extraocular muscles intact; Moist Oropharynx without erythema, edema, or exudates  Chest:  No pectus carinatum; No pectus excavatum  Cardiovascular:  Regular rate and rhythm with (+) II/VI Systolic Murmur without rubs or gallops  Respiratory:  Clear to Auscultation Bilaterally without wheezes, rales, or rhonchi; normal effort of breathing  Abdominal:  Soft, non-tense, non-tender abdomen; BS present without guarding, rebound, or masses  :  Deferred  Extremities:  Pulses 2+ x4 without edema, clubbing, or cyanosis  Musculoskeletal:  Strength 5/5 and symmetrical in BUE and BLE  Integument:  No rash on face, forearms, or legs  Neurological:  Alert & Ostensibly Oriented x4/4; No gross deficits of Visual Acuity, Eye Movement, Jaw Opening, Facial Expression, Hearing, Phonation, or Head Movement;  No gross deficits of Tongue Movement or Slurring of Speech  Psychiatric:  Affect is appropriate; Language is present and fluent; Behavior is appropriate      Laboratory Studies: All lab results for the last 24 hours reviewed. Images Reviewed:  No results found. Assessment:     Hospital Problems  Date Reviewed: 8/23/2022            Codes Class Noted POA    Chest pain ICD-10-CM: R07.9  ICD-9-CM: 786.50  4/14/2020 Yes        Osteoarthritis of right knee (Chronic) ICD-10-CM: M17.11  ICD-9-CM: 715.96  9/22/2016 Yes        Sleep apnea (Chronic) ICD-10-CM: G47.30  ICD-9-CM: 780.57  8/22/2016 Yes        Dyslipidemia, goal LDL below 70 (Chronic) ICD-10-CM: E78.5  ICD-9-CM: 272.4  4/26/2016 Yes        Type 2 diabetes mellitus without complication (HCC) (Chronic) ICD-10-CM: E11.9  ICD-9-CM: 250.00  4/26/2016 Yes        Coronary artery disease involving native coronary artery of native heart without angina pectoris (Chronic) ICD-10-CM: I25.10  ICD-9-CM: 414.01  4/26/2016 Yes        S/P coronary artery stent placement (Chronic) ICD-10-CM: Z95.5  ICD-9-CM: V45.82  4/26/2016 Yes        Smoker (Chronic) ICD-10-CM: D80.919  ICD-9-CM: 305.1  4/26/2016 Yes        Chronic pain syndrome (Chronic) ICD-10-CM: G89.4  ICD-9-CM: 338.4  11/6/2014 Yes        History of depression (Chronic) ICD-10-CM: Z86.59  ICD-9-CM: V11.8  11/6/2014 Yes        Marijuana use (Chronic) ICD-10-CM: F12.90  ICD-9-CM: 305.20  11/6/2014 Yes           Plan:     Chest Pain  Telemetry, serial Troponins, serial EKG, Aspirin 81 mg, Rosuvastatin 40 mg, HbA1c, Lipid Panel, TSH, Echocardiogram in AM, and Consult Cardiological services. GERD  Continue home Famotidine. Diabetes Mellitus Type 2  POC Glucose checks qACHS with SSI coverage. Tobacco Abuse  Patient declines Nicotine Patch. DVT Prophylaxis  DVT chemoprophylaxis is achieved with subcutaneous Enoxaparin 40 mg qday.         12 minutes were spent in Preparation to see Patient and Obtaining and Reviewing Separate History  12 minutes were spent in Examination, Counseling, & Educating Patient  2 minutes were spent (if any) in Coordinating Care with Nursing Staff and Physicians  20 minutes were spent Documenting in EHR and Interpreting Tests Independently  A Total of 46 minutes were spent seeing this Patient. This Clinician spent >50% of time spent seeing this Patient.     Signed By: Niurka Craig DO     August 23, 2022

## 2022-08-25 VITALS
DIASTOLIC BLOOD PRESSURE: 89 MMHG | SYSTOLIC BLOOD PRESSURE: 154 MMHG | OXYGEN SATURATION: 96 % | BODY MASS INDEX: 27.16 KG/M2 | TEMPERATURE: 98.6 F | WEIGHT: 169 LBS | HEART RATE: 65 BPM | RESPIRATION RATE: 24 BRPM | HEIGHT: 66 IN

## 2022-08-25 LAB
ANION GAP SERPL CALC-SCNC: 9 MMOL/L (ref 3–18)
ATRIAL RATE: 51 BPM
BASOPHILS # BLD: 0.1 K/UL (ref 0–0.1)
BASOPHILS NFR BLD: 1 % (ref 0–2)
BUN SERPL-MCNC: 8 MG/DL (ref 7–18)
BUN/CREAT SERPL: 9 (ref 12–20)
CALCIUM SERPL-MCNC: 9.4 MG/DL (ref 8.5–10.1)
CALCULATED P AXIS, ECG09: 64 DEGREES
CALCULATED R AXIS, ECG10: 59 DEGREES
CALCULATED T AXIS, ECG11: 79 DEGREES
CHLORIDE SERPL-SCNC: 107 MMOL/L (ref 100–111)
CO2 SERPL-SCNC: 25 MMOL/L (ref 21–32)
CREAT SERPL-MCNC: 0.88 MG/DL (ref 0.6–1.3)
DIAGNOSIS, 93000: NORMAL
DIFFERENTIAL METHOD BLD: ABNORMAL
EOSINOPHIL # BLD: 0.5 K/UL (ref 0–0.4)
EOSINOPHIL NFR BLD: 6 % (ref 0–5)
ERYTHROCYTE [DISTWIDTH] IN BLOOD BY AUTOMATED COUNT: 12.8 % (ref 11.6–14.5)
GLUCOSE BLD STRIP.AUTO-MCNC: 130 MG/DL (ref 70–110)
GLUCOSE SERPL-MCNC: 118 MG/DL (ref 74–99)
HCT VFR BLD AUTO: 41.1 % (ref 35–45)
HGB BLD-MCNC: 13.8 G/DL (ref 12–16)
IMM GRANULOCYTES # BLD AUTO: 0 K/UL (ref 0–0.04)
IMM GRANULOCYTES NFR BLD AUTO: 0 % (ref 0–0.5)
LYMPHOCYTES # BLD: 2.8 K/UL (ref 0.9–3.6)
LYMPHOCYTES NFR BLD: 36 % (ref 21–52)
MAGNESIUM SERPL-MCNC: 2.3 MG/DL (ref 1.6–2.6)
MCH RBC QN AUTO: 32.5 PG (ref 24–34)
MCHC RBC AUTO-ENTMCNC: 33.6 G/DL (ref 31–37)
MCV RBC AUTO: 96.7 FL (ref 78–100)
MONOCYTES # BLD: 0.6 K/UL (ref 0.05–1.2)
MONOCYTES NFR BLD: 8 % (ref 3–10)
NEUTS SEG # BLD: 3.8 K/UL (ref 1.8–8)
NEUTS SEG NFR BLD: 49 % (ref 40–73)
NRBC # BLD: 0 K/UL (ref 0–0.01)
NRBC BLD-RTO: 0 PER 100 WBC
P-R INTERVAL, ECG05: 214 MS
PHOSPHATE SERPL-MCNC: 3.7 MG/DL (ref 2.5–4.9)
PLATELET # BLD AUTO: 279 K/UL (ref 135–420)
PMV BLD AUTO: 10.1 FL (ref 9.2–11.8)
POTASSIUM SERPL-SCNC: 3.6 MMOL/L (ref 3.5–5.5)
Q-T INTERVAL, ECG07: 464 MS
QRS DURATION, ECG06: 80 MS
QTC CALCULATION (BEZET), ECG08: 427 MS
RBC # BLD AUTO: 4.25 M/UL (ref 4.2–5.3)
SODIUM SERPL-SCNC: 141 MMOL/L (ref 136–145)
VENTRICULAR RATE, ECG03: 51 BPM
WBC # BLD AUTO: 7.7 K/UL (ref 4.6–13.2)

## 2022-08-25 PROCEDURE — 83735 ASSAY OF MAGNESIUM: CPT

## 2022-08-25 PROCEDURE — 80048 BASIC METABOLIC PNL TOTAL CA: CPT

## 2022-08-25 PROCEDURE — 84100 ASSAY OF PHOSPHORUS: CPT

## 2022-08-25 PROCEDURE — 36415 COLL VENOUS BLD VENIPUNCTURE: CPT

## 2022-08-25 PROCEDURE — 82962 GLUCOSE BLOOD TEST: CPT

## 2022-08-25 PROCEDURE — 99239 HOSP IP/OBS DSCHRG MGMT >30: CPT | Performed by: HOSPITALIST

## 2022-08-25 PROCEDURE — 85025 COMPLETE CBC W/AUTO DIFF WBC: CPT

## 2022-08-25 PROCEDURE — 74011250637 HC RX REV CODE- 250/637: Performed by: NURSE PRACTITIONER

## 2022-08-25 PROCEDURE — 74011250637 HC RX REV CODE- 250/637: Performed by: HOSPITALIST

## 2022-08-25 PROCEDURE — 74011000250 HC RX REV CODE- 250: Performed by: NURSE PRACTITIONER

## 2022-08-25 PROCEDURE — 93005 ELECTROCARDIOGRAM TRACING: CPT

## 2022-08-25 PROCEDURE — 74011250637 HC RX REV CODE- 250/637: Performed by: INTERNAL MEDICINE

## 2022-08-25 PROCEDURE — 94762 N-INVAS EAR/PLS OXIMTRY CONT: CPT

## 2022-08-25 RX ADMIN — GABAPENTIN 600 MG: 300 CAPSULE ORAL at 09:11

## 2022-08-25 RX ADMIN — EZETIMIBE 10 MG: 10 TABLET ORAL at 09:11

## 2022-08-25 RX ADMIN — HYDRALAZINE HYDROCHLORIDE 25 MG: 25 TABLET, FILM COATED ORAL at 09:11

## 2022-08-25 RX ADMIN — ASPIRIN 81 MG: 81 TABLET, COATED ORAL at 09:11

## 2022-08-25 RX ADMIN — THERA TABS 1 TABLET: TAB at 09:11

## 2022-08-25 RX ADMIN — FAMOTIDINE 20 MG: 20 TABLET ORAL at 09:12

## 2022-08-25 RX ADMIN — SODIUM CHLORIDE, PRESERVATIVE FREE 10 ML: 5 INJECTION INTRAVENOUS at 04:00

## 2022-08-25 RX ADMIN — CLOPIDOGREL BISULFATE 75 MG: 75 TABLET ORAL at 09:12

## 2022-08-25 RX ADMIN — METOPROLOL TARTRATE 25 MG: 25 TABLET, FILM COATED ORAL at 09:11

## 2022-08-25 NOTE — PROGRESS NOTES
Patient has transitional care follow up with Pcp Dr. Sacha Rose on 9/02/2022 at 11:00 am. Cardiology follow up with Dr. Evita Harrington on 10/18/2022 at 2:50 pm.

## 2022-08-25 NOTE — PROGRESS NOTES
Patient left against medical advise. Refused to sign AMA form. Using profound language. Attending and supervisor informed.

## 2022-08-25 NOTE — ACP (ADVANCE CARE PLANNING)
Advance Care Planning   Advance Care Planning Inpatient Note  [address] [facility] Department    Today's Date: 8/25/2022  [facility]: [facility] CV STEPDOWN    Received request from patient. Upon review of chart and communication with care team, patient's decision making abilities are not in question. Patient was/were present in the room during visit. Goals of ACP Conversation:  Discuss Advance Care planning documents    Health Care Decision Makers:    No healthcare decision makers have been documented. Click here to complete [address] including selection of the Healthcare Decision Maker Relationship (ie \"Primary\")  Summary:  Documented Next of Kin, per patient report      Advance Care Planning Documents (Patient Wishes) on file:  None     Assessment:    Found patient to be very angry and agitated this morning. Patient was complaining about staff and her not being able to get any help. 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 Interventions:  Deferred conversation as patient not interested in completing an advance directive at this time due to here anger and current status of pain.     Care Preferences Communicated:  No    Outcomes/Plan:      Kj Ortiz [facility] on 8/25/2022 at 11:37 AM

## 2022-08-25 NOTE — ROUTINE PROCESS
1913:Bedside and Verbal shift change report given to [address] [place] [address] (oncoming nurse) by Phani Paula RN (offgoing nurse). Report included the following information SBAR, Kardex, Intake/Output, MAR, Recent Results, and Cardiac Rhythm SB . Quietly resting in bed. HOB elevated. No SOB on RA. Rt wrist cath site. CDI. No bleeding. No hematoma. sensations intact. Call light within reach. 1918: Medicated for pain per patient request.     2111: Due meds given. . No coverage provided per protocol. HS snack provided. 2329: No change from previous assessment. 0200: Resting. Unable to sleep.    0400: No change from previous assessment. 0406: EKG subsequent done per protocol. 0600: Slept on & off thru night. Needs attended. 4739: Bedside and Verbal shift change report given to [employer] (oncoming nurse) by Andie Crouch RN (offgoing nurse). Report included the following information SBAR, Kardex, Intake/Output, MAR, Recent Results, and Cardiac Rhythm SB/SR . Wound Prevention Checklist    Patient: Micky Casper (46 y.o. female)  Date: 8/25/2022  Diagnosis: Chest pain [R07.9] Chest pain    Precautions:         []  Heel prevention boots placed on patient    []  Patient turned q2h during shift    []  Lift team ordered    []  Patient on Shaun bed/Specialty bed    []  Each Wound is documented during shift (Stage, Color, drainage, odor, measurements, and dressings)    [x]  Dual skin checks done at bedside during shift report with Phani Cotton RN

## 2022-08-25 NOTE — ROUTINE PROCESS
1913:Bedside and Verbal shift change report given to Clinton S Carlos Chad (oncoming nurse) by Vee Vallejo RN (offgoing nurse). Report included the following information SBAR, Kardex, Intake/Output, MAR, Recent Results, and Cardiac Rhythm SB . Quietly resting in bed. HOB elevated. No SOB on RA. Rt wrist cath site. CDI. No bleeding. No hematoma. sensations intact. Call light within reach. 1918: Medicated for pain per patient request.     2111: Due meds given. . No coverage provided per protocol. HS snack provided. 2329: No change from previous assessment. 0200: Resting. Unable to sleep.    0400: No change from previous assessment. 0406: EKG subsequent done per protocol. 0600: Slept on & off thru night. Needs attended.

## 2022-08-25 NOTE — DIABETES MGMT
Diabetes/ Glycemic Control Plan of Care  Recommendations:   Diabetes consult received and completed. Blood glucose currently controlled. Patient emotional and crying this am.  \"I just want to go home. \"  Patient's  and friend at bedside. Patient's nurse informed. Will continue inpatient monitoring. Assessment:   DX:   1. Acute chest pain        2. NSTEMI (non-ST elevated myocardial infarction) [facility])  CARDIAC PROCEDURE    CARDIAC PROCEDURE      3. Type 2 diabetes mellitus without complication, without long-term current use of insulin (HCC) [E11.9 (ICD-10-CM)]        4. Dyslipidemia, goal LDL below 70 [E78.5 (ICD-10-CM)]        5. S/P coronary artery stent placement [Z95.5 (ICD-10-CM)]           Fasting/ Morning blood glucose:   Lab Results   Component Value Date/Time    Glucose 118 (H) 08/25/2022 04:50 AM    Glucose (POC) 130 (H) 08/25/2022 08:27 AM     IV Fluids containing dextrose: none   Steroids:  none     Blood glucose values:        Latest Reference Range & Units 8/24/22 15:58 8/24/22 20:54 8/25/22 08:27   GLUCOSE,FAST - POC 70 - 110 mg/dL 141 (H) 111 (H) 130 (H)   (H): Data is abnormally high  Within target range (70-180mg/dL):  yes   Current insulin orders:   Lispro corrective insulin coverage AC&HS  Total Daily Dose previous 24 hours = none   Current A1c:   Lab Results   Component Value Date/Time    Hemoglobin A1c 6.6 (H) 08/23/2022 05:20 PM      Equivalent  to an average Blood Glucose of 143 mg/dl for 2-3 months prior to admission  Adequate glycemic control PTA: yes   Nutrition/Diet:   Active Orders   Diet    ADULT DIET Regular; 5 carb choices (75 gm/meal); Low Sodium (2 gm)      Meal Intake:  Patient Vitals for the past 168 hrs:   % Diet Eaten   08/24/22 1846 1 - 25%     Supplement Intake:  No data found. Home diabetes medications:  Verified with pt. 8/25/2022  Key Antihyperglycemic Medications               glipiZIDE (GLUCOTROL) 10 mg tablet (Taking) Take 10 mg by mouth daily. Need refills. dulaglutide (TRULICITY) 1.90 CI/9.5 mL sub-q pen (Taking) 0.75 mg by SubCUTAneous route every seven (7) days. metFORMIN (GLUCOPHAGE) 500 mg tablet (Taking) Take 500 mg by mouth daily (with breakfast). Plan/Goals:   Blood glucose will be within target of 70 - 180 mg/dl within 72 hours  Reinforce dietary and medication compliance at home.        Education:  [] Refer to Diabetes Education Record                       [x] Education not indicated at this time     Chico Crane, [facility] CDE  Ext [phone]

## 2022-08-25 NOTE — CONSULTS
Comprehensive Nutrition Assessment    Type and Reason for Visit: Reassess, Consult    Nutrition Recommendations/Plan:   Modify diet order to 2 gm Na Restriction, CCHO5 portions. D/C oral supplement per pt request/dislike. Monitor PO intake, weight, labs and plan of care during admission. Malnutrition Assessment:  Malnutrition Status: At risk for malnutrition (specify) (r/t poor PO intake and presistent n/v PTA) (08/24/22 1407)      Nutrition Assessment:    Pt s/p cardiac cath, 1 stent placed. Pt reports eating the eggs, potatoes and coffee this morning. States she is feeling much better, appetite/intake has improved, denies vomiting. Does not like or want the oral supplement. No muscle/fat wasting noted. Nutrition Related Findings:    Pertinent Meds: pulmicort, pepcid, gabapentin, humalog, theragran  Pertinent Labs: reviewed Wound Type: None    Current Nutrition Intake & Therapies:  Average Meal Intake: 26-50%  Average Supplement Intake: NPO  ADULT ORAL NUTRITION SUPPLEMENT Lunch, Dinner; Diabetic Supplement  ADULT DIET Regular; 3 carb choices (45 gm/meal)    Anthropometric Measures:  Height: 5' 6\" (167.6 cm)  Ideal Body Weight (IBW): 130 lbs (59 kg)  Admission Body Weight: 169 lb 12.1 oz  Current Body Wt:  77 kg (169 lb 12.1 oz), 130.6 % IBW. Bed scale  Current BMI (kg/m2): 27.4  Usual Body Weight: 79.4 kg (175 lb)  % Weight Change (Calculated): -3  Weight Adjustment: No adjustment     BMI Category: Overweight (BMI 25.0-29. 9)    Estimated Daily Nutrient Needs:  Energy Requirements Based On: Formula (MSJ x 1.2-1.3)  Weight Used for Energy Requirements: Current  Energy (kcal/day): 5425-6562  Weight Used for Protein Requirements: Current (0.8-1.0)  Protein (g/day): 62-77  Method Used for Fluid Requirements: 1 ml/kcal  Fluid (ml/day): 6981-4159    Nutrition Diagnosis:   Inadequate oral intake related to early satiety, inadequate protein-energy intake as evidenced by  (reported intake <75% x >1 month PTA.)    Nutrition Interventions:   Food and/or Nutrient Delivery: Modify current diet, Discontinue oral nutrition supplement  Nutrition Education/Counseling: Education declined  Coordination of Nutrition Care: Continue to monitor while inpatient  Plan of Care discussed with: pt    Nutrition Education:  Pt reports receiving diet education PTA, has no diet related questions at this time. States she does not cook with salt, only uses Mrs[employer] Anna and does not eat red meat. States she knows what to do. Reports her blood sugars are rarely over 150 mg/dl. Declined any diet handouts at this time.      Goals:  Previous Goal Met: Progressing toward goal(s)  Goals: Meet at least 75% of estimated needs, by next RD assessment       Nutrition Monitoring and Evaluation:   Behavioral-Environmental Outcomes: None identified  Food/Nutrient Intake Outcomes: Food and nutrient intake, Supplement intake  Physical Signs/Symptoms Outcomes: Biochemical data, Meal time behavior, Weight, GI status    Discharge Planning:    Continue current diet    Radha Dumas 87, [address] [place] [address], [address] [place] [address]   Contact: [phone]

## 2022-08-25 NOTE — PROGRESS NOTES
conducted an initial consultation and Spiritual Assessment for Bryanna, who is a 63 y.o.,female. Patients Primary Language is: [place]. According to the patients EMR Jainism Affiliation is: No preference. The reason the Patient came to the hospital is:   Patient Active Problem List    Diagnosis Date Noted    Postsurgical percutaneous transluminal coronary angioplasty status 08/13/2020    Hypokalemia 04/14/2020    Chest pain 04/14/2020    Osteoarthritis of right knee 09/22/2016    Sleep apnea 08/22/2016    Dyslipidemia, goal LDL below 70 04/26/2016    Type 2 diabetes mellitus without complication ([facility] [address] 21/06/6224    Coronary artery disease involving native coronary artery of native heart without angina pectoris 04/26/2016    S/P coronary artery stent placement 04/26/2016    Smoker 04/26/2016    [facility] 06/[facility]    Knee pain, bilateral 11/06/2014    Encounter for long-term (current) use of other medications 11/06/2014    Chronic pain syndrome 11/06/2014    Generalized OA 11/06/2014    Polyarthralgia 11/06/2014    History of depression 11/06/2014    Marijuana use 11/06/2014    Essential hypertension, benign         The  provided the following Interventions:    attempted to Initiated a relationship of care and support with patient in room 2314 where patient was found to be very angry an agitated. Patient complained of not beng able to sleep and had not been able to get any help when she needed it. She said that she never saw a doctor or had a nurse to come because she said they were short staffed. Listened to her story and tried to ease patient fears and pain. Family came in and everything got stirred up again. Patient left AMA it seems. There was no inquirey into advance directive due to her current state of mind. Provided information about Spiritual Care Services.   Offered prayer and assurance of continued prayers on patients behalf. .    The following outcomes were achieved:  Patient shared limited information about her medical narrative and spiritual journey/beliefs. Patient processed feeling about current hospitalization. Patient expressed gratitude for pastoral care visit. Assessment:  Patient does not have any Moravian/cultural needs that will affect patients preferences in health care. There are no further spiritual or Moravian issues which require Spiritual Care Services interventions at this time. Plan:  Chaplains will continue to follow and will provide pastoral care on an as needed/requested basis    . Wanda Benton   Spiritual Care   [phone]

## 2022-09-15 NOTE — DISCHARGE SUMMARY
Discharge Summary    Patient: Christiano Pérez MRN: [id number]  CSN: [id number]    YOB: 1960  Age: 63 y.o.   Sex: female    DOA: 8/23/2022 LOS:  LOS: 2 days   Discharge Date: 8/25/2022     Admission Diagnoses: Chest pain [R07.9]    Discharge Diagnoses:    Problem List as of 8/25/2022 Date Reviewed: 8/23/2022            Codes Class Noted - Resolved    Postsurgical percutaneous transluminal coronary angioplasty status ICD-10-CM: Z98.61  ICD-9-CM: V45.82  8/13/2020 - Present        Hypokalemia ICD-10-CM: E87.6  ICD-9-CM: 276.8  4/14/2020 - Present        * (Principal) Chest pain ICD-10-CM: R07.9  ICD-9-CM: 786.50  4/14/2020 - Present        Osteoarthritis of right knee (Chronic) ICD-10-CM: M17.11  ICD-9-CM: 715.96  9/22/2016 - Present        Sleep apnea (Chronic) ICD-10-CM: G47.30  ICD-9-CM: 780.57  8/22/2016 - Present        Dyslipidemia, goal LDL below 70 (Chronic) ICD-10-CM: E78.5  ICD-9-CM: 272.4  4/26/2016 - Present        Type 2 diabetes mellitus without complication (HCC) (Chronic) ICD-10-CM: E11.9  ICD-9-CM: 250.00  4/26/2016 - Present        Coronary artery disease involving native coronary artery of native heart without angina pectoris (Chronic) ICD-10-CM: I25.10  ICD-9-CM: 414.01  4/26/2016 - Present        S/P coronary artery stent placement (Chronic) ICD-10-CM: Z95.5  ICD-9-CM: V45.82  4/26/2016 - Present        Smoker (Chronic) ICD-10-CM: R58.957  ICD-9-CM: 305.1  4/26/2016 - Present        NSTEMI (non-ST elevated myocardial infarction) ([facility] [address] ICD-10-CM: I21.4  ICD-9-CM: 410.70  6/2/2015 - Present        Knee pain, bilateral ICD-10-CM: M25.561, M25.562  ICD-9-CM: 719.46  11/6/2014 - Present        Encounter for long-term (current) use of other medications ICD-10-CM: Z79.899  ICD-9-CM: V58.69  11/6/2014 - Present        Chronic pain syndrome (Chronic) ICD-10-CM: G89.4  ICD-9-CM: 338.4  11/6/2014 - Present        Generalized OA ICD-10-CM: M15.9  ICD-9-CM: 715.00  11/6/2014 - Present Polyarthralgia ICD-10-CM: M25.50  ICD-9-CM: 719.49  11/6/2014 - Present        History of depression (Chronic) ICD-10-CM: Z86.59  ICD-9-CM: V11.8  11/6/2014 - Present        Marijuana use (Chronic) ICD-10-CM: F12.90  ICD-9-CM: 305.20  11/6/2014 - Present        Essential hypertension, benign ICD-10-CM: I10  ICD-9-CM: 401.1  Unknown - Present        RESOLVED: Other and unspecified hyperlipidemia ICD-10-CM: E78.5  ICD-9-CM: 272.4  8/3/2021 - 8/3/2021        RESOLVED: CAD (coronary artery disease) ICD-10-CM: I25.10  ICD-9-CM: 414.00  4/14/2020 - 8/3/2021        RESOLVED: Cholecystitis without calculus ICD-10-CM: K81.9  ICD-9-CM: 575.10  5/23/2013 - 5/24/2013        RESOLVED: Pre-operative cardiovascular examination ICD-10-CM: Z01.810  ICD-9-CM: V72.81  Unknown - 9/30/2019           Reason for admission  Patient came to hospital with chest pain and unstable angina reminiscent of her previous angina. Troponin increased now up to 247. Currently pain-free. Has been taking medication as prescribed. Patient is hemodynamically stable. Blood pressure slightly elevated. No evidence of fluid overload. No significant murmur. No edema or no rales. Discharge Condition: Unstable, patient chose to leave AMA    PHYSICAL EXAM   Visit Vitals  BP (!) 154/89 (BP 1 Location: Left upper arm, BP Patient Position: At rest)   Pulse 65   Temp 98.6 °F (37 °C)   Resp 24   Ht 5' 6\" (1.676 m)   Wt 76.7 kg (169 lb)   SpO2 96%   Breastfeeding No   BMI 27.28 kg/m²     General: WD, WN. Alert, cooperative, no acute distress    HEENT: NC, Atraumatic. PERRLA, EOMI. Anicteric sclerae. Lungs:  CTA Bilaterally. No Wheezing/Rhonchi/Rales. Heart:  Regular  rhythm,  No murmur, No Rubs, No Gallops  Abdomen: Soft, Non distended, Non tender. +Bowel sounds, no HSM  Extremities: No c/c/e  Psych:   Good insight. Not anxious or agitated. Neurologic:  CN 2-12 grossly intact, oriented X 3.   No acute neurological Deficits,     Hospital Course:   1.  unstable angina status postcardiac cath, drug-eluting stent. Continue aspirin, Plavix, Crestor, Lopressor. Tobacco cessation. Avoidance of secondhand smoke exposure. Echo noted. Cardiology input appreciated. 2. Tobacco use d/o. Smoking cessation education  3. Sleep apnea  4. DM2. A1c 6.6.   5. Dyslipidemia on statin  6. 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 Chronic pain syndrome  8. Colonoscopy 8/3/2022: two sessile polyps   9. Rheumatoid arthritis, on Adalimumab  10. Secondhand smoke exposure. Patient states she will talk with her . 11.  Hypertension, suboptimal control. Restarted losartan. Increased beta-blocker, heart rate noted. Low-dose hydralazine started. 12. DVT prophylaxis was given in the form of Lovenox subcut daily. 13..  Full code. Patient unfortunately chose to leave AMA. Consults: Cardiology    Procedures  Cardiac cath Conception MD Michelle 8/24/2022  LM: Normal  LAD: Proximal mid patent stent, distal luminal irregularities. Diagonal branches without any significant artery disease. LCx: Small-medium caliber vessel, mid vessel 50% stenosis, unchanged from prior angiogram, OM: 30-40% luminal irregularities  RCA: Proximal 20-30%, distal 30%, RPL, large vessel with 70-75% mid vessel stenosis, RPDA: Small-medium vessel, mid 50%, distal 90%, small caliber vessel at that time.   Medical management recommended  Normal LVEF and LVEDP  S/p PCI and stenting of 70-75% mid RPDA stenosis using 2.75 x 15 mm EDMUNDO    Significant Diagnostic Studies: labs:   Lab Results   Component Value Date/Time    WBC 7.7 08/25/2022 04:50 AM    HGB 13.8 08/25/2022 04:50 AM    HCT 41.1 08/25/2022 04:50 AM    PLATELET 092 31/11/9534 04:50 AM    MCV 96.7 08/25/2022 04:50 AM     Lab Results   Component Value Date/Time    Sodium 141 08/25/2022 04:50 AM    Potassium 3.6 08/25/2022 04:50 AM    Chloride 107 08/25/2022 04:50 AM    CO2 25 08/25/2022 04:50 AM    Anion gap 9 08/25/2022 04:50 AM    Glucose 118 (H) 08/25/2022 04:50 AM    BUN 8 08/25/2022 04:50 AM    Creatinine 0.88 08/25/2022 04:50 AM    BUN/Creatinine ratio 9 (L) 08/25/2022 04:50 AM    GFR est AA >60 08/25/2022 04:50 AM    GFR est non-AA >60 08/25/2022 04:50 AM    Calcium 9.4 08/25/2022 04:50 AM         Discharge Medications:   Patient left AMA, no discharge medications given.

## 2022-11-01 ENCOUNTER — TRANSCRIBE ORDER (OUTPATIENT)
Dept: SCHEDULING | Age: 62
End: 2022-11-01

## 2022-11-01 DIAGNOSIS — R11.2 NAUSEA WITH VOMITING, UNSPECIFIED: Primary | ICD-10-CM

## 2022-11-15 ENCOUNTER — HOSPITAL ENCOUNTER (OUTPATIENT)
Dept: CT IMAGING | Age: 62
Discharge: HOME | End: 2022-11-15
Attending: INTERNAL MEDICINE
Payer: MEDICARE

## 2022-11-15 DIAGNOSIS — R11.2 NAUSEA WITH VOMITING, UNSPECIFIED: ICD-10-CM

## 2022-11-15 LAB — CREAT UR-MCNC: 1 MG/DL (ref 0.6–1.3)

## 2022-11-15 PROCEDURE — 74011000636 HC RX REV CODE- 636: Performed by: INTERNAL MEDICINE

## 2022-11-15 PROCEDURE — 82565 ASSAY OF CREATININE: CPT

## 2022-11-15 PROCEDURE — 74177 CT ABD & PELVIS W/CONTRAST: CPT

## 2022-11-15 RX ADMIN — DIATRIZOATE MEGLUMINE AND DIATRIZOATE SODIUM 30 ML: 660; 100 LIQUID ORAL; RECTAL at 16:00

## 2022-11-15 RX ADMIN — IOPAMIDOL 100 ML: 612 INJECTION, SOLUTION INTRAVENOUS at 17:43

## 2023-07-12 ENCOUNTER — HOSPITAL ENCOUNTER (OUTPATIENT)
Facility: HOSPITAL | Age: 63
Setting detail: RECURRING SERIES
Discharge: HOME | End: 2023-07-15
Payer: MEDICARE

## 2023-07-12 PROCEDURE — 97535 SELF CARE MNGMENT TRAINING: CPT

## 2023-07-12 PROCEDURE — 97161 PT EVAL LOW COMPLEX 20 MIN: CPT

## 2023-07-12 NOTE — PROGRESS NOTES
[address] PHYSICAL THERAPY AT [facility]  [address], [place], [address]  Phone: [phone] Fax [phone]  Plan of Care / Statement of Necessity for Physical Therapy Services     Patient Name: Sabrina Castañeda : 1960   Treatment   Diagnosis: M25.511  RIGHT SHOULDER PAIN Medical Diagnosis: Right shoulder pain [M25.511]   Onset Date: DOS: 23 Payor Source: Payor: [id number] W  / Plan: Dub Drain / Product Type: *No Product type* /    Referral Source: Nargis Nobles MD [place] [facility]): 2023   Prior Hospitalization: See medical history Provider #: [id number]   Prior Level of Function: Right handed. Functionally indep. Pt re-tearing of RTC after first surgery. Work Hx: Retired   Living Situation: Pt resides with spouse and sister   Comorbidities: Aug 2020 Right shoulder RTC repair with biceps involvement, Bilat knee replacement, 4 heart stents, 4 MI, HTN, Diabetes, Colonoscopy, hemorrhoid surgery, RA   Medications: Verified on Patient Summary List     Assessment / key information:    Patient is a 63 y.o. yo female who presents to [facility] [place] with diagnosis of Right shoulder pain [M25.511]. Patient reports chief complaint of right shoulder pain . Mechanism of Injury: DOS: 23 s/p right RTC repair; Pt reports she thinks she re-injured right shoulder weekend of . Pt reports she was lifting her 23 lbs grandson. Upon objective evaluation, the patient demonstrates the following impairments: rounded shoulder posture in sitting, impaired right shoulder PROM, unable to test strength due to subjective reports and surgical protocol, TTP right shoulder supraspinatus, biceps and posterior aspect of right shoulder. Patient scored 50 on FOTO indicating moderate decreased function and quality of life.  Functional limitations include Pt reports unable to lift right UE, pt reports limited use
w/heat    min [] Estim Attended, type/location:                                     []  w/US     []  w/ice    []  w/heat    []  TENS insruct      min []  Mechanical Traction: type/lbs                   []  pro   []  sup   []  int   []  cont    []  before manual    []  after manual    min []  Ultrasound, settings/location:      min []  Iontophoresis w/ dexamethasone, location:                                               []  take home patch       []  in clinic   8'  min  unbill [x]  Ice     []  Heat    location/position: Seated, right shoulder     min []  Paraffin,  details:     min []  Vasopneumatic Device, press/temp:     min []  Raenette Brisk / Thena Evaristo: If using vaso (only need to measure limb vaso being performed on)      pre-treatment girth :       post-treatment girth :       measured at (landmark location) :      min []  Other:    Skin assessment post-treatment (if applicable):    [x]  intact    []  redness- no adverse reaction                 []redness - adverse reaction:           21 min [x]Eval                  []Re-Eval       Therapeutic Procedures: Tx Min Billable or 1:1 Min (if diff from Tx Min) Procedure, Rationale, Specifics   8 (while seated with cold pack)  [id number] Self Care/Home Management (timed):  improve patient knowledge and understanding of pain reducing techniques, diagnosis/prognosis, and physical therapy expectations, procedures and progression  to improve patient's ability to progress to PLOF and address remaining functional goals.   (see flow sheet as applicable)     Details if applicable:  Pt education provided on cold pack application (07-64 minutes with appropriate layering to avoid skin burns- perform skin check during and after applying heat or ice), reaching out to MD due to subjective reports and increased pain, diagnosis, prognosis, PT POC if cleared by MD          Details if applicable:            Details if applicable:            Details if applicable:            Details if

## 2024-12-04 NOTE — ROUTINE PROCESS
2000 - Bedside shift change report given to Thelma Palacios RN (oncoming nurse) by Kaci Mcrae RN (offgoing nurse). Report included the following information SBAR, Procedure Summary, Intake/Output, MAR, Recent Results and Cardiac Rhythm NSR.     0700 - Bedside shift change report given to Abdon Roque RN (oncoming nurse) by Thelma Palacios RN (offgoing nurse). Report included the following information SBAR, Procedure Summary, Intake/Output, MAR, Recent Results and Cardiac Rhythm Sinus Beth Hernández. intact

## (undated) DEVICE — BASIN EMSIS 16OZ GRAPHITE PLAS KID SHP MOLD GRAD FOR ORAL

## (undated) DEVICE — ANGIOGRAPHY KIT CUST VASC

## (undated) DEVICE — PRESSURE MONITORING SET: Brand: TRUWAVE

## (undated) DEVICE — GUIDEWIRE VASC L185CM DIA0.014IN HYDRPHLC PTFE STR TIP

## (undated) DEVICE — FORCEPS BX L240CM JAW DIA2.8MM L CAP W/ NDL MIC MESH TOOTH

## (undated) DEVICE — ENDOSCOPY PUMP TUBING/ CAP SET: Brand: ERBE

## (undated) DEVICE — COPILOT BLEEDBACK CONTROL VALVE: Brand: COPILOT

## (undated) DEVICE — CANNULA NSL AD TBNG L14FT STD PVC O2 CRV CONN NONFLARED NSL

## (undated) DEVICE — GUIDEWIRE VASC STR 3 CM 0.014 INX190 CM BAL MIDWT UNIV

## (undated) DEVICE — SNARE ENDOSCP POLYP 2.4 MM 240 CM 10 MM 2.8 MM CAPTIVATOR

## (undated) DEVICE — STERILE POLYISOPRENE POWDER-FREE SURGICAL GLOVES: Brand: PROTEXIS

## (undated) DEVICE — GUIDEWIRE VASC L260CM DIA0035IN TIP L3MM PTFE J STD TAPR FIX

## (undated) DEVICE — SURGICAL PROCEDURE KIT LT HRT CUST

## (undated) DEVICE — DEVICE INFL 60ML 12ATM CONVENIENT LOK REL HNDL HI PRSS FLX

## (undated) DEVICE — BITE BLOCK ENDOSCP UNIV AD 6 TO 9.4 MM

## (undated) DEVICE — RADIFOCUS OPTITORQUE ANGIOGRAPHIC CATHETER: Brand: OPTITORQUE

## (undated) DEVICE — CANNULA ORIG TL CLR W FOAM CUSHIONS AND 14FT SUPL TB 3 CHN

## (undated) DEVICE — CATH ANGI BLLN DIL 2.75X08MM -- NC EUPHORA

## (undated) DEVICE — SYRINGE MED 25GA 3ML L5/8IN SUBQ PLAS W/ DETACH NDL SFTY

## (undated) DEVICE — BAND HEMOSTAT DRY D-STAT RAD --

## (undated) DEVICE — SYR 20ML LL STRL LF --

## (undated) DEVICE — CATH BLLN DIL 2.0X12MM RX -- EUPHORA

## (undated) DEVICE — GLIDESHEATH SLENDER STAINLESS STEEL KIT: Brand: GLIDESHEATH SLENDER

## (undated) DEVICE — CATH GUID COR EB35 6FR 100CM -- LAUNCHER

## (undated) DEVICE — FLUFF AND POLYMER UNDERPAD,EXTRA HEAVY: Brand: WINGS

## (undated) DEVICE — SET FLD ADMIN 3 W STPCOCK FIX FEM L BOR 1IN

## (undated) DEVICE — GAUZE,SPONGE,4"X4",16PLY,STRL,LF,10/TRAY: Brand: MEDLINE

## (undated) DEVICE — DEVICE INFL W ACCS + HEMSTAS VLV INSRT TOOL AND TORQ BASIX

## (undated) DEVICE — GOWN ISOL IMPERV UNIV, DISP, OPEN BACK, BLUE --

## (undated) DEVICE — MEDI-VAC NON-CONDUCTIVE SUCTION TUBING: Brand: CARDINAL HEALTH

## (undated) DEVICE — SOLUTION IRRIG 1000ML H2O STRL BLT

## (undated) DEVICE — PACK PROCEDURE SURG VASC CATH 161 MMC LF

## (undated) DEVICE — PROCEDURE KIT FLUID MGMT 10 FR CUST MAINFOLD

## (undated) DEVICE — CATHETER GUID 6FR L100CM GRN PTFE JR4 TRUELUMEN HYBRID

## (undated) DEVICE — SYR 50ML SLIP TIP NSAF LF STRL --

## (undated) DEVICE — LINER SUCT CANSTR 3000CC PLAS SFT PRE ASSEMB W/OUT TBNG W/

## (undated) DEVICE — ESOPHAGEAL BALLOON DILATATION CATHETER: Brand: CRE FIXED WIRE

## (undated) DEVICE — YANKAUER,SMOOTH HANDLE,HIGH CAPACITY: Brand: MEDLINE INDUSTRIES, INC.

## (undated) DEVICE — CATHETER SUCT TR FL TIP 14FR W/ O CTRL

## (undated) DEVICE — COVER US PRB W15XL120CM W/ GEL RUBBERBAND TAPE STRP FLD GEN

## (undated) DEVICE — SYR 10ML LUER LOK 1/5ML GRAD --

## (undated) DEVICE — SET ANGIO L6.5IN L BOR 3 W STPCOCK SPIK TBNG

## (undated) DEVICE — BAND RADIAL COMPR ARTERY 24CM -- REG BX/10